# Patient Record
Sex: MALE | Race: WHITE | NOT HISPANIC OR LATINO | Employment: OTHER | URBAN - METROPOLITAN AREA
[De-identification: names, ages, dates, MRNs, and addresses within clinical notes are randomized per-mention and may not be internally consistent; named-entity substitution may affect disease eponyms.]

---

## 2017-03-02 ENCOUNTER — OFFICE VISIT (OUTPATIENT)
Dept: INTERNAL MEDICINE | Facility: CLINIC | Age: 67
End: 2017-03-02

## 2017-03-02 VITALS
HEIGHT: 68 IN | DIASTOLIC BLOOD PRESSURE: 82 MMHG | TEMPERATURE: 98.1 F | BODY MASS INDEX: 34.95 KG/M2 | OXYGEN SATURATION: 98 % | WEIGHT: 230.6 LBS | HEART RATE: 60 BPM | SYSTOLIC BLOOD PRESSURE: 130 MMHG

## 2017-03-02 DIAGNOSIS — E29.1 HYPOGONADISM IN MALE: ICD-10-CM

## 2017-03-02 DIAGNOSIS — Z00.00 ROUTINE HEALTH MAINTENANCE: ICD-10-CM

## 2017-03-02 DIAGNOSIS — R73.01 IMPAIRED FASTING GLUCOSE: ICD-10-CM

## 2017-03-02 DIAGNOSIS — E53.8 VITAMIN B12 DEFICIENCY: ICD-10-CM

## 2017-03-02 DIAGNOSIS — R73.03 PREDIABETES: ICD-10-CM

## 2017-03-02 DIAGNOSIS — N40.1 BENIGN NON-NODULAR PROSTATIC HYPERPLASIA WITH LOWER URINARY TRACT SYMPTOMS: ICD-10-CM

## 2017-03-02 DIAGNOSIS — E78.5 HYPERLIPIDEMIA, UNSPECIFIED HYPERLIPIDEMIA TYPE: Primary | ICD-10-CM

## 2017-03-02 DIAGNOSIS — Z23 IMMUNIZATION DUE: ICD-10-CM

## 2017-03-02 PROCEDURE — G0009 ADMIN PNEUMOCOCCAL VACCINE: HCPCS | Performed by: FAMILY MEDICINE

## 2017-03-02 PROCEDURE — 99214 OFFICE O/P EST MOD 30 MIN: CPT | Performed by: FAMILY MEDICINE

## 2017-03-02 PROCEDURE — 90670 PCV13 VACCINE IM: CPT | Performed by: FAMILY MEDICINE

## 2017-03-02 RX ORDER — INFLUENZA A VIRUS A/MICHIGAN/45/2015 X-275 (H1N1) ANTIGEN (FORMALDEHYDE INACTIVATED), INFLUENZA A VIRUS A/SINGAPORE/INFIMH-16-0019/2016 IVR-186 (H3N2) ANTIGEN (FORMALDEHYDE INACTIVATED), AND INFLUENZA B VIRUS B/MARYLAND/15/2016 BX-69A (A B/COLORADO/6/2017-LIKE VIRUS) ANTIGEN (FORMALDEHYDE INACTIVATED) 60; 60; 60 UG/.5ML; UG/.5ML; UG/.5ML
INJECTION, SUSPENSION INTRAMUSCULAR
COMMUNITY
Start: 2016-12-26 | End: 2017-05-08

## 2017-03-02 NOTE — PROGRESS NOTES
Subjective     Berhane Chaidez is a 66 y.o. male, who presents with a chief complaint of   Chief Complaint   Patient presents with   • Follow-up     3 month.   • Hyperlipidemia   • Prediabetes       HPI     1. Hyperlipidemia.  Pt continues to tolerate simvastatin.  He exercises and tries to watch his diet.    2. Prediabetes.  We decreased the metformin to once daily last visit since A1c was 5.2.    3.  BPH.  Pt reports symptoms are controlled with current medication.  He sees his urologist regularly.    The following portions of the patient's history were reviewed and updated as appropriate: allergies, current medications, past family history, past medical history, past social history, past surgical history and problem list.    Allergies: Review of patient's allergies indicates no known allergies.    Review of Systems   Constitutional: Negative.    HENT: Negative.    Eyes: Negative.    Respiratory: Negative.    Cardiovascular: Negative.    Gastrointestinal: Negative.    Endocrine: Negative.    Genitourinary: Negative.    Musculoskeletal: Negative.    Skin: Negative.    Allergic/Immunologic: Negative.    Neurological: Negative.    Hematological: Negative.    Psychiatric/Behavioral: Negative.        Objective     Wt Readings from Last 3 Encounters:   03/02/17 230 lb 9.6 oz (105 kg)   11/23/16 227 lb 9.6 oz (103 kg)   08/25/16 220 lb (99.8 kg)     Temp Readings from Last 3 Encounters:   03/02/17 98.1 °F (36.7 °C)   08/25/16 98.4 °F (36.9 °C)   02/17/16 98.6 °F (37 °C)     BP Readings from Last 3 Encounters:   03/02/17 130/82   11/23/16 140/92   08/25/16 144/91     Pulse Readings from Last 3 Encounters:   03/02/17 60   11/23/16 80   08/25/16 79     Body mass index is 35.06 kg/(m^2).  SpO2 Readings from Last 3 Encounters:   03/02/17 98%   11/23/16 98%   08/25/16 95%       Physical Exam   Constitutional: He appears well-developed and well-nourished.   HENT:   Head: Normocephalic.   Eyes: Conjunctivae are normal.   Neck: No  thyromegaly present.   Cardiovascular: Normal rate, regular rhythm and normal heart sounds.    No murmur heard.  Pulmonary/Chest: Effort normal and breath sounds normal.   Abdominal: Soft. There is no tenderness.   Musculoskeletal: He exhibits no edema.   Lymphadenopathy:     He has no cervical adenopathy.   Neurological: He is alert.   Skin: Skin is warm and dry.   Psychiatric: He has a normal mood and affect.   Vitals reviewed.      Results for orders placed or performed in visit on 11/28/16   Comprehensive Metabolic Panel   Result Value Ref Range    Glucose 109 (H) 65 - 99 mg/dL    BUN 14 8 - 23 mg/dL    Creatinine 0.98 0.76 - 1.27 mg/dL    eGFR Non African Am 76 >60 mL/min/1.73    eGFR African Am 92 >60 mL/min/1.73    BUN/Creatinine Ratio 14.3 7.0 - 25.0    Sodium 139 136 - 145 mmol/L    Potassium 4.4 3.5 - 5.2 mmol/L    Chloride 99 98 - 107 mmol/L    Total CO2 27.5 22.0 - 29.0 mmol/L    Calcium 9.2 8.8 - 10.5 mg/dL    Total Protein 6.4 6.0 - 8.5 g/dL    Albumin 4.20 3.50 - 5.20 g/dL    Globulin 2.2 gm/dL    A/G Ratio 1.9 g/dL    Total Bilirubin 0.5 0.2 - 1.2 mg/dL    Alkaline Phosphatase 74 40 - 129 U/L    AST (SGOT) 15 5 - 40 U/L    ALT (SGPT) 20 5 - 41 U/L   Lipid Panel With / Chol / HDL Ratio   Result Value Ref Range    Total Cholesterol 166 0 - 200 mg/dL    Triglycerides 102 0 - 150 mg/dL    HDL Cholesterol 68 (H) 40 - 60 mg/dL    VLDL Cholesterol 20.4 8 - 32 mg/dL    LDL Cholesterol  78 0 - 100 mg/dL    Chol/HDL Ratio 2.44    Hemoglobin A1c   Result Value Ref Range    Hemoglobin A1C 5.80 (H) 4.80 - 5.60 %   Vitamin B12   Result Value Ref Range    Vitamin B-12 400 211 - 946 pg/mL   TSH   Result Value Ref Range    TSH 0.904 0.270 - 4.200 mIU/mL   CBC & Differential   Result Value Ref Range    WBC 4.54 (L) 4.80 - 10.80 10*3/mm3    RBC 5.81 4.70 - 6.10 10*6/mm3    Hemoglobin 15.9 14.0 - 18.0 g/dL    Hematocrit 48.4 42.0 - 52.0 %    MCV 83.3 80.0 - 94.0 fL    MCH 27.4 27.0 - 31.0 pg    MCHC 32.9 31.0 - 37.0  g/dL    RDW 13.6 11.5 - 14.5 %    Platelets 240 140 - 500 10*3/mm3    Neutrophil Rel % 67.5 45.0 - 70.0 %    Lymphocyte Rel % 17.8 (L) 20.0 - 45.0 %    Monocyte Rel % 9.9 (H) 3.0 - 8.0 %    Eosinophil Rel % 3.7 0.0 - 4.0 %    Basophil Rel % 0.9 0.0 - 2.0 %    Neutrophils Absolute 3.06 1.50 - 8.30 10*3/mm3    Lymphocytes Absolute 0.81 0.60 - 4.80 10*3/mm3    Monocytes Absolute 0.45 0.00 - 1.00 10*3/mm3    Eosinophils Absolute 0.17 0.10 - 0.30 10*3/mm3    Basophils Absolute 0.04 0.00 - 0.20 10*3/mm3    Immature Granulocyte Rel % 0.2 0.0 - 0.5 %    Immature Grans Absolute 0.01 0.00 - 0.03 10*3/mm3    nRBC 0.0 0.0 - 0.0 /100 WBC       Assessment/Plan   Berhane was seen today for follow-up, hyperlipidemia and prediabetes.    Diagnoses and all orders for this visit:    Hyperlipidemia, unspecified hyperlipidemia type  -     Comprehensive Metabolic Panel; Future  -     Lipid Panel With / Chol / HDL Ratio; Future    Prediabetes  -     Hemoglobin A1c; Future  -     TSH; Future    Benign non-nodular prostatic hyperplasia with lower urinary tract symptoms    Hypogonadism in male  -     CBC & Differential; Future    Impaired fasting glucose   -     Hemoglobin A1c; Future    Routine health maintenance    1. Hyperlipidemia.  Well-controlled.  Continue statin and lifestyle measures.    2. Prediabetes.  A1c 5.8.  Continue once daily metformin and lifestyle measures.    3. BPH.  Symptoms controlled.  Per Dr. Dumont.    4. Hypogonadism.  Has Testopel implants per Dr. Dumont.    5. Routine health maint.  Prevnar today.  Pneumovax in one year.      Outpatient Medications Prior to Visit   Medication Sig Dispense Refill   • aspirin 81 MG EC tablet Take 81 mg by mouth daily.     • Garlic 10 MG capsule 2,000 mg daily.     • Glucosamine-Chondroit-Vit C-Mn (GLUCOSAMINE 1500 COMPLEX) capsule Take 1 can by mouth daily.     • ibuprofen (ADVIL,MOTRIN) 800 MG tablet Take 800 mg by mouth Every 6 (Six) Hours As Needed.     • Lysine HCl 1000 MG tablet  Take  by mouth daily.     • metFORMIN (GLUCOPHAGE) 500 MG tablet Take 1 tablet by mouth Daily With Breakfast. Hold for 48 hours from time of CAT scan.  Then resume normal course 180 tablet 0   • SILDENAFIL CITRATE PO Take 20 mg by mouth daily.     • simvastatin (ZOCOR) 40 MG tablet Take 1 tablet by mouth daily. AT BEDTIME 90 tablet 3   • tamsulosin (FLOMAX) 0.4 MG capsule 24 hr capsule Take 1 capsule by mouth daily. 90 capsule 3   • Testosterone (TESTOPEL IL) by Implant route. Injections Twice a Year.       No facility-administered medications prior to visit.      No orders of the defined types were placed in this encounter.    [unfilled]  There are no discontinued medications.      Return in about 6 months (around 9/2/2017).

## 2017-03-03 DIAGNOSIS — Z23 IMMUNIZATION DUE: Primary | ICD-10-CM

## 2017-03-03 RX ORDER — CYANOCOBALAMIN 1000 UG/ML
1000 INJECTION, SOLUTION INTRAMUSCULAR; SUBCUTANEOUS
Status: DISCONTINUED | OUTPATIENT
Start: 2017-03-03 | End: 2017-03-03

## 2017-03-07 ENCOUNTER — RESULTS ENCOUNTER (OUTPATIENT)
Dept: INTERNAL MEDICINE | Facility: CLINIC | Age: 67
End: 2017-03-07

## 2017-03-07 DIAGNOSIS — R73.03 PREDIABETES: ICD-10-CM

## 2017-03-07 DIAGNOSIS — E29.1 HYPOGONADISM IN MALE: ICD-10-CM

## 2017-03-07 DIAGNOSIS — R73.01 IMPAIRED FASTING GLUCOSE: ICD-10-CM

## 2017-03-07 DIAGNOSIS — E78.5 HYPERLIPIDEMIA, UNSPECIFIED HYPERLIPIDEMIA TYPE: ICD-10-CM

## 2017-05-08 ENCOUNTER — HOSPITAL ENCOUNTER (OUTPATIENT)
Dept: GENERAL RADIOLOGY | Facility: HOSPITAL | Age: 67
End: 2017-05-08

## 2017-05-08 ENCOUNTER — HOSPITAL ENCOUNTER (OUTPATIENT)
Dept: GENERAL RADIOLOGY | Facility: HOSPITAL | Age: 67
Discharge: HOME | End: 2017-05-08

## 2017-05-08 ENCOUNTER — HOSPITAL ENCOUNTER (OUTPATIENT)
Dept: GENERAL RADIOLOGY | Facility: HOSPITAL | Age: 67
Discharge: HOME | End: 2017-05-08
Admitting: ORTHOPAEDIC SURGERY

## 2017-05-08 ENCOUNTER — APPOINTMENT (OUTPATIENT)
Dept: PREADMISSION TESTING | Facility: HOSPITAL | Age: 67
End: 2017-05-08

## 2017-05-08 VITALS
DIASTOLIC BLOOD PRESSURE: 81 MMHG | RESPIRATION RATE: 16 BRPM | SYSTOLIC BLOOD PRESSURE: 150 MMHG | WEIGHT: 236 LBS | BODY MASS INDEX: 35.77 KG/M2 | OXYGEN SATURATION: 97 % | HEIGHT: 68 IN | HEART RATE: 83 BPM | TEMPERATURE: 98.2 F

## 2017-05-08 LAB
ALBUMIN SERPL-MCNC: 4.1 G/DL (ref 3.5–5.2)
ALBUMIN/GLOB SERPL: 1.5 G/DL
ALP SERPL-CCNC: 61 U/L (ref 39–117)
ALT SERPL W P-5'-P-CCNC: 22 U/L (ref 1–41)
ANION GAP SERPL CALCULATED.3IONS-SCNC: 11.6 MMOL/L
APTT PPP: 26.5 SECONDS (ref 22.7–35.4)
AST SERPL-CCNC: 16 U/L (ref 1–40)
BASOPHILS # BLD AUTO: 0.03 10*3/MM3 (ref 0–0.2)
BASOPHILS NFR BLD AUTO: 0.4 % (ref 0–1.5)
BILIRUB SERPL-MCNC: 0.3 MG/DL (ref 0.1–1.2)
BILIRUB UR QL STRIP: NEGATIVE
BUN BLD-MCNC: 17 MG/DL (ref 8–23)
BUN/CREAT SERPL: 12.3 (ref 7–25)
CALCIUM SPEC-SCNC: 9.6 MG/DL (ref 8.6–10.5)
CHLORIDE SERPL-SCNC: 100 MMOL/L (ref 98–107)
CLARITY UR: ABNORMAL
CO2 SERPL-SCNC: 29.4 MMOL/L (ref 22–29)
COLOR UR: YELLOW
CREAT BLD-MCNC: 1.38 MG/DL (ref 0.76–1.27)
DEPRECATED RDW RBC AUTO: 51.5 FL (ref 37–54)
EOSINOPHIL # BLD AUTO: 0.15 10*3/MM3 (ref 0–0.7)
EOSINOPHIL NFR BLD AUTO: 1.8 % (ref 0.3–6.2)
ERYTHROCYTE [DISTWIDTH] IN BLOOD BY AUTOMATED COUNT: 15.7 % (ref 11.5–14.5)
GFR SERPL CREATININE-BSD FRML MDRD: 51 ML/MIN/1.73
GLOBULIN UR ELPH-MCNC: 2.7 GM/DL
GLUCOSE BLD-MCNC: 120 MG/DL (ref 65–99)
GLUCOSE UR STRIP-MCNC: NEGATIVE MG/DL
HCT VFR BLD AUTO: 49 % (ref 40.4–52.2)
HGB BLD-MCNC: 16.2 G/DL (ref 13.7–17.6)
HGB UR QL STRIP.AUTO: NEGATIVE
IMM GRANULOCYTES # BLD: 0.02 10*3/MM3 (ref 0–0.03)
IMM GRANULOCYTES NFR BLD: 0.2 % (ref 0–0.5)
INR PPP: 0.97 (ref 0.9–1.1)
KETONES UR QL STRIP: NEGATIVE
LEUKOCYTE ESTERASE UR QL STRIP.AUTO: NEGATIVE
LYMPHOCYTES # BLD AUTO: 1.19 10*3/MM3 (ref 0.9–4.8)
LYMPHOCYTES NFR BLD AUTO: 13.9 % (ref 19.6–45.3)
MCH RBC QN AUTO: 29.2 PG (ref 27–32.7)
MCHC RBC AUTO-ENTMCNC: 33.1 G/DL (ref 32.6–36.4)
MCV RBC AUTO: 88.4 FL (ref 79.8–96.2)
MONOCYTES # BLD AUTO: 0.7 10*3/MM3 (ref 0.2–1.2)
MONOCYTES NFR BLD AUTO: 8.2 % (ref 5–12)
NEUTROPHILS # BLD AUTO: 6.46 10*3/MM3 (ref 1.9–8.1)
NEUTROPHILS NFR BLD AUTO: 75.5 % (ref 42.7–76)
NITRITE UR QL STRIP: NEGATIVE
PH UR STRIP.AUTO: 7 [PH] (ref 5–8)
PLATELET # BLD AUTO: 230 10*3/MM3 (ref 140–500)
PMV BLD AUTO: 9.9 FL (ref 6–12)
POTASSIUM BLD-SCNC: 4 MMOL/L (ref 3.5–5.2)
PROT SERPL-MCNC: 6.8 G/DL (ref 6–8.5)
PROT UR QL STRIP: NEGATIVE
PROTHROMBIN TIME: 12.5 SECONDS (ref 11.7–14.2)
RBC # BLD AUTO: 5.54 10*6/MM3 (ref 4.6–6)
SODIUM BLD-SCNC: 141 MMOL/L (ref 136–145)
SP GR UR STRIP: 1.02 (ref 1–1.03)
UROBILINOGEN UR QL STRIP: ABNORMAL
WBC NRBC COR # BLD: 8.55 10*3/MM3 (ref 4.5–10.7)

## 2017-05-08 PROCEDURE — 93010 ELECTROCARDIOGRAM REPORT: CPT | Performed by: INTERNAL MEDICINE

## 2017-05-08 PROCEDURE — 85610 PROTHROMBIN TIME: CPT | Performed by: ORTHOPAEDIC SURGERY

## 2017-05-08 PROCEDURE — 85730 THROMBOPLASTIN TIME PARTIAL: CPT | Performed by: ORTHOPAEDIC SURGERY

## 2017-05-08 PROCEDURE — 85025 COMPLETE CBC W/AUTO DIFF WBC: CPT | Performed by: ORTHOPAEDIC SURGERY

## 2017-05-08 PROCEDURE — 93005 ELECTROCARDIOGRAM TRACING: CPT

## 2017-05-08 PROCEDURE — 81003 URINALYSIS AUTO W/O SCOPE: CPT | Performed by: ORTHOPAEDIC SURGERY

## 2017-05-08 PROCEDURE — 36415 COLL VENOUS BLD VENIPUNCTURE: CPT | Performed by: FAMILY MEDICINE

## 2017-05-08 PROCEDURE — 80053 COMPREHEN METABOLIC PANEL: CPT | Performed by: ORTHOPAEDIC SURGERY

## 2017-05-08 PROCEDURE — 71020 HC CHEST PA AND LATERAL: CPT

## 2017-05-08 PROCEDURE — 73560 X-RAY EXAM OF KNEE 1 OR 2: CPT

## 2017-05-22 ENCOUNTER — ANESTHESIA EVENT (OUTPATIENT)
Dept: PERIOP | Facility: HOSPITAL | Age: 67
End: 2017-05-22

## 2017-05-22 ENCOUNTER — APPOINTMENT (OUTPATIENT)
Dept: GENERAL RADIOLOGY | Facility: HOSPITAL | Age: 67
End: 2017-05-22

## 2017-05-22 ENCOUNTER — ANESTHESIA (OUTPATIENT)
Dept: PERIOP | Facility: HOSPITAL | Age: 67
End: 2017-05-22

## 2017-05-22 ENCOUNTER — HOSPITAL ENCOUNTER (INPATIENT)
Facility: HOSPITAL | Age: 67
LOS: 1 days | Discharge: HOME HEALTH | End: 2017-05-23
Attending: ORTHOPAEDIC SURGERY | Admitting: ORTHOPAEDIC SURGERY

## 2017-05-22 DIAGNOSIS — R26.89 IMPAIRED GAIT AND MOBILITY: Primary | ICD-10-CM

## 2017-05-22 PROBLEM — M17.9 OSTEOARTHRITIS, KNEE: Status: ACTIVE | Noted: 2017-05-22

## 2017-05-22 LAB
GLUCOSE BLDC GLUCOMTR-MCNC: 103 MG/DL (ref 70–130)
GLUCOSE BLDC GLUCOMTR-MCNC: 133 MG/DL (ref 70–130)
GLUCOSE BLDC GLUCOMTR-MCNC: 149 MG/DL (ref 70–130)

## 2017-05-22 PROCEDURE — 97161 PT EVAL LOW COMPLEX 20 MIN: CPT

## 2017-05-22 PROCEDURE — 25010000002 HYDROMORPHONE PER 4 MG: Performed by: NURSE ANESTHETIST, CERTIFIED REGISTERED

## 2017-05-22 PROCEDURE — 73560 X-RAY EXAM OF KNEE 1 OR 2: CPT

## 2017-05-22 PROCEDURE — 93005 ELECTROCARDIOGRAM TRACING: CPT | Performed by: ANESTHESIOLOGY

## 2017-05-22 PROCEDURE — 93010 ELECTROCARDIOGRAM REPORT: CPT | Performed by: INTERNAL MEDICINE

## 2017-05-22 PROCEDURE — C1776 JOINT DEVICE (IMPLANTABLE): HCPCS | Performed by: ORTHOPAEDIC SURGERY

## 2017-05-22 PROCEDURE — 0SRC0J9 REPLACEMENT OF RIGHT KNEE JOINT WITH SYNTHETIC SUBSTITUTE, CEMENTED, OPEN APPROACH: ICD-10-PCS | Performed by: ORTHOPAEDIC SURGERY

## 2017-05-22 PROCEDURE — 25010000003 CEFAZOLIN IN DEXTROSE 2-4 GM/100ML-% SOLUTION: Performed by: ORTHOPAEDIC SURGERY

## 2017-05-22 PROCEDURE — C1713 ANCHOR/SCREW BN/BN,TIS/BN: HCPCS | Performed by: ORTHOPAEDIC SURGERY

## 2017-05-22 PROCEDURE — 25010000003 CEFAZOLIN IN DEXTROSE 2-4 GM/100ML-% SOLUTION: Performed by: NURSE ANESTHETIST, CERTIFIED REGISTERED

## 2017-05-22 PROCEDURE — 25010000002 FENTANYL CITRATE (PF) 100 MCG/2ML SOLUTION

## 2017-05-22 PROCEDURE — 25010000002 MIDAZOLAM PER 1 MG: Performed by: ANESTHESIOLOGY

## 2017-05-22 PROCEDURE — 25010000002 PROPOFOL 10 MG/ML EMULSION: Performed by: NURSE ANESTHETIST, CERTIFIED REGISTERED

## 2017-05-22 PROCEDURE — 25010000002 FENTANYL CITRATE (PF) 100 MCG/2ML SOLUTION: Performed by: NURSE ANESTHETIST, CERTIFIED REGISTERED

## 2017-05-22 PROCEDURE — 25010000002 ONDANSETRON PER 1 MG: Performed by: NURSE ANESTHETIST, CERTIFIED REGISTERED

## 2017-05-22 PROCEDURE — 25010000002 VANCOMYCIN: Performed by: ORTHOPAEDIC SURGERY

## 2017-05-22 PROCEDURE — 25010000002 ONDANSETRON PER 1 MG: Performed by: ORTHOPAEDIC SURGERY

## 2017-05-22 PROCEDURE — 25010000002 HYDROMORPHONE PER 4 MG

## 2017-05-22 PROCEDURE — 82962 GLUCOSE BLOOD TEST: CPT

## 2017-05-22 DEVICE — IMPLANTABLE DEVICE: Type: IMPLANTABLE DEVICE | Site: KNEE | Status: FUNCTIONAL

## 2017-05-22 DEVICE — TOTL KN FM CEM VE ZIMMER: Type: IMPLANTABLE DEVICE | Site: KNEE | Status: FUNCTIONAL

## 2017-05-22 DEVICE — ART/SRF KN PERSONA/VE MC EF 8TO11 10MM RT: Type: IMPLANTABLE DEVICE | Site: KNEE | Status: FUNCTIONAL

## 2017-05-22 DEVICE — EXT STEM FEM/KN PERSONA TPR 14XPLS30MM: Type: IMPLANTABLE DEVICE | Status: FUNCTIONAL

## 2017-05-22 DEVICE — COMP FEM/KN PERSONA CR CMT COCR STD SZ10 RT: Type: IMPLANTABLE DEVICE | Site: KNEE | Status: FUNCTIONAL

## 2017-05-22 DEVICE — CMT BONE PALACOS 120001: Type: IMPLANTABLE DEVICE | Site: KNEE | Status: FUNCTIONAL

## 2017-05-22 DEVICE — STEM TIB/KN PERSONA CMT 5D SZF RT: Type: IMPLANTABLE DEVICE | Site: KNEE | Status: FUNCTIONAL

## 2017-05-22 RX ORDER — NALOXONE HCL 0.4 MG/ML
0.1 VIAL (ML) INJECTION
Status: DISCONTINUED | OUTPATIENT
Start: 2017-05-22 | End: 2017-05-23 | Stop reason: HOSPADM

## 2017-05-22 RX ORDER — SODIUM CHLORIDE 0.9 % (FLUSH) 0.9 %
1-10 SYRINGE (ML) INJECTION AS NEEDED
Status: DISCONTINUED | OUTPATIENT
Start: 2017-05-22 | End: 2017-05-23 | Stop reason: HOSPADM

## 2017-05-22 RX ORDER — SODIUM CHLORIDE 9 MG/ML
INJECTION, SOLUTION INTRAVENOUS AS NEEDED
Status: DISCONTINUED | OUTPATIENT
Start: 2017-05-22 | End: 2017-05-22 | Stop reason: HOSPADM

## 2017-05-22 RX ORDER — PROMETHAZINE HYDROCHLORIDE 25 MG/1
25 SUPPOSITORY RECTAL ONCE AS NEEDED
Status: DISCONTINUED | OUTPATIENT
Start: 2017-05-22 | End: 2017-05-22 | Stop reason: HOSPADM

## 2017-05-22 RX ORDER — TAMSULOSIN HYDROCHLORIDE 0.4 MG/1
0.4 CAPSULE ORAL DAILY
Status: DISCONTINUED | OUTPATIENT
Start: 2017-05-22 | End: 2017-05-23 | Stop reason: HOSPADM

## 2017-05-22 RX ORDER — MIDAZOLAM HYDROCHLORIDE 1 MG/ML
2 INJECTION INTRAMUSCULAR; INTRAVENOUS
Status: DISCONTINUED | OUTPATIENT
Start: 2017-05-22 | End: 2017-05-22 | Stop reason: HOSPADM

## 2017-05-22 RX ORDER — DOCUSATE SODIUM 100 MG/1
100 CAPSULE, LIQUID FILLED ORAL 2 TIMES DAILY PRN
Status: DISCONTINUED | OUTPATIENT
Start: 2017-05-22 | End: 2017-05-23 | Stop reason: HOSPADM

## 2017-05-22 RX ORDER — FENTANYL CITRATE 50 UG/ML
50 INJECTION, SOLUTION INTRAMUSCULAR; INTRAVENOUS
Status: DISCONTINUED | OUTPATIENT
Start: 2017-05-22 | End: 2017-05-22 | Stop reason: HOSPADM

## 2017-05-22 RX ORDER — LIDOCAINE HYDROCHLORIDE 20 MG/ML
INJECTION, SOLUTION INFILTRATION; PERINEURAL AS NEEDED
Status: DISCONTINUED | OUTPATIENT
Start: 2017-05-22 | End: 2017-05-22 | Stop reason: SURG

## 2017-05-22 RX ORDER — OXYCODONE HYDROCHLORIDE AND ACETAMINOPHEN 5; 325 MG/1; MG/1
2 TABLET ORAL EVERY 4 HOURS PRN
Status: DISCONTINUED | OUTPATIENT
Start: 2017-05-22 | End: 2017-05-23 | Stop reason: HOSPADM

## 2017-05-22 RX ORDER — ONDANSETRON 4 MG/1
4 TABLET, FILM COATED ORAL EVERY 6 HOURS PRN
Status: DISCONTINUED | OUTPATIENT
Start: 2017-05-22 | End: 2017-05-23 | Stop reason: HOSPADM

## 2017-05-22 RX ORDER — ASPIRIN 325 MG
325 TABLET, DELAYED RELEASE (ENTERIC COATED) ORAL DAILY
Status: DISCONTINUED | OUTPATIENT
Start: 2017-05-22 | End: 2017-05-23 | Stop reason: HOSPADM

## 2017-05-22 RX ORDER — FERROUS SULFATE 325(65) MG
325 TABLET ORAL
Status: DISCONTINUED | OUTPATIENT
Start: 2017-05-22 | End: 2017-05-23 | Stop reason: HOSPADM

## 2017-05-22 RX ORDER — ROCURONIUM BROMIDE 10 MG/ML
INJECTION, SOLUTION INTRAVENOUS AS NEEDED
Status: DISCONTINUED | OUTPATIENT
Start: 2017-05-22 | End: 2017-05-22 | Stop reason: SURG

## 2017-05-22 RX ORDER — DIPHENHYDRAMINE HCL 25 MG
25 CAPSULE ORAL EVERY 6 HOURS PRN
Status: DISCONTINUED | OUTPATIENT
Start: 2017-05-22 | End: 2017-05-23 | Stop reason: HOSPADM

## 2017-05-22 RX ORDER — FAMOTIDINE 10 MG/ML
20 INJECTION, SOLUTION INTRAVENOUS ONCE
Status: COMPLETED | OUTPATIENT
Start: 2017-05-22 | End: 2017-05-22

## 2017-05-22 RX ORDER — SODIUM CHLORIDE 0.9 % (FLUSH) 0.9 %
1-10 SYRINGE (ML) INJECTION AS NEEDED
Status: DISCONTINUED | OUTPATIENT
Start: 2017-05-22 | End: 2017-05-22 | Stop reason: HOSPADM

## 2017-05-22 RX ORDER — FENTANYL CITRATE 50 UG/ML
INJECTION, SOLUTION INTRAMUSCULAR; INTRAVENOUS
Status: COMPLETED
Start: 2017-05-22 | End: 2017-05-22

## 2017-05-22 RX ORDER — ONDANSETRON 2 MG/ML
4 INJECTION INTRAMUSCULAR; INTRAVENOUS EVERY 6 HOURS PRN
Status: DISCONTINUED | OUTPATIENT
Start: 2017-05-22 | End: 2017-05-23 | Stop reason: HOSPADM

## 2017-05-22 RX ORDER — PROMETHAZINE HYDROCHLORIDE 25 MG/ML
12.5 INJECTION, SOLUTION INTRAMUSCULAR; INTRAVENOUS ONCE AS NEEDED
Status: DISCONTINUED | OUTPATIENT
Start: 2017-05-22 | End: 2017-05-22 | Stop reason: HOSPADM

## 2017-05-22 RX ORDER — PROMETHAZINE HYDROCHLORIDE 25 MG/1
12.5 TABLET ORAL ONCE AS NEEDED
Status: DISCONTINUED | OUTPATIENT
Start: 2017-05-22 | End: 2017-05-22 | Stop reason: HOSPADM

## 2017-05-22 RX ORDER — ACETAMINOPHEN 500 MG
1000 TABLET ORAL ONCE
Status: COMPLETED | OUTPATIENT
Start: 2017-05-22 | End: 2017-05-22

## 2017-05-22 RX ORDER — CEFAZOLIN SODIUM 2 G/100ML
2 INJECTION, SOLUTION INTRAVENOUS EVERY 8 HOURS
Status: COMPLETED | OUTPATIENT
Start: 2017-05-22 | End: 2017-05-23

## 2017-05-22 RX ORDER — BISACODYL 5 MG/1
10 TABLET, DELAYED RELEASE ORAL DAILY PRN
Status: DISCONTINUED | OUTPATIENT
Start: 2017-05-22 | End: 2017-05-23 | Stop reason: HOSPADM

## 2017-05-22 RX ORDER — TRANEXAMIC ACID 100 MG/ML
INJECTION, SOLUTION INTRAVENOUS AS NEEDED
Status: DISCONTINUED | OUTPATIENT
Start: 2017-05-22 | End: 2017-05-22 | Stop reason: SURG

## 2017-05-22 RX ORDER — PROPOFOL 10 MG/ML
VIAL (ML) INTRAVENOUS AS NEEDED
Status: DISCONTINUED | OUTPATIENT
Start: 2017-05-22 | End: 2017-05-22 | Stop reason: SURG

## 2017-05-22 RX ORDER — DIPHENHYDRAMINE HYDROCHLORIDE 50 MG/ML
12.5 INJECTION INTRAMUSCULAR; INTRAVENOUS
Status: DISCONTINUED | OUTPATIENT
Start: 2017-05-22 | End: 2017-05-22 | Stop reason: HOSPADM

## 2017-05-22 RX ORDER — ONDANSETRON 2 MG/ML
4 INJECTION INTRAMUSCULAR; INTRAVENOUS ONCE AS NEEDED
Status: DISCONTINUED | OUTPATIENT
Start: 2017-05-22 | End: 2017-05-22 | Stop reason: HOSPADM

## 2017-05-22 RX ORDER — MIDAZOLAM HYDROCHLORIDE 1 MG/ML
1 INJECTION INTRAMUSCULAR; INTRAVENOUS
Status: DISCONTINUED | OUTPATIENT
Start: 2017-05-22 | End: 2017-05-22 | Stop reason: HOSPADM

## 2017-05-22 RX ORDER — LABETALOL HYDROCHLORIDE 5 MG/ML
5 INJECTION, SOLUTION INTRAVENOUS
Status: DISCONTINUED | OUTPATIENT
Start: 2017-05-22 | End: 2017-05-22 | Stop reason: HOSPADM

## 2017-05-22 RX ORDER — FENTANYL CITRATE 50 UG/ML
INJECTION, SOLUTION INTRAMUSCULAR; INTRAVENOUS AS NEEDED
Status: DISCONTINUED | OUTPATIENT
Start: 2017-05-22 | End: 2017-05-22 | Stop reason: SURG

## 2017-05-22 RX ORDER — SODIUM CHLORIDE, SODIUM LACTATE, POTASSIUM CHLORIDE, CALCIUM CHLORIDE 600; 310; 30; 20 MG/100ML; MG/100ML; MG/100ML; MG/100ML
9 INJECTION, SOLUTION INTRAVENOUS CONTINUOUS
Status: DISCONTINUED | OUTPATIENT
Start: 2017-05-22 | End: 2017-05-23 | Stop reason: HOSPADM

## 2017-05-22 RX ORDER — SODIUM CHLORIDE, SODIUM LACTATE, POTASSIUM CHLORIDE, CALCIUM CHLORIDE 600; 310; 30; 20 MG/100ML; MG/100ML; MG/100ML; MG/100ML
100 INJECTION, SOLUTION INTRAVENOUS CONTINUOUS
Status: DISCONTINUED | OUTPATIENT
Start: 2017-05-22 | End: 2017-05-23 | Stop reason: HOSPADM

## 2017-05-22 RX ORDER — ONDANSETRON 4 MG/1
4 TABLET, ORALLY DISINTEGRATING ORAL EVERY 6 HOURS PRN
Status: DISCONTINUED | OUTPATIENT
Start: 2017-05-22 | End: 2017-05-23 | Stop reason: HOSPADM

## 2017-05-22 RX ORDER — HYDROCODONE BITARTRATE AND ACETAMINOPHEN 7.5; 325 MG/1; MG/1
1 TABLET ORAL ONCE AS NEEDED
Status: DISCONTINUED | OUTPATIENT
Start: 2017-05-22 | End: 2017-05-22 | Stop reason: HOSPADM

## 2017-05-22 RX ORDER — NALOXONE HCL 0.4 MG/ML
0.2 VIAL (ML) INJECTION AS NEEDED
Status: DISCONTINUED | OUTPATIENT
Start: 2017-05-22 | End: 2017-05-22 | Stop reason: HOSPADM

## 2017-05-22 RX ORDER — CEFAZOLIN SODIUM 2 G/100ML
INJECTION, SOLUTION INTRAVENOUS AS NEEDED
Status: DISCONTINUED | OUTPATIENT
Start: 2017-05-22 | End: 2017-05-22 | Stop reason: SURG

## 2017-05-22 RX ORDER — HYDRALAZINE HYDROCHLORIDE 20 MG/ML
5 INJECTION INTRAMUSCULAR; INTRAVENOUS
Status: DISCONTINUED | OUTPATIENT
Start: 2017-05-22 | End: 2017-05-22 | Stop reason: HOSPADM

## 2017-05-22 RX ORDER — OXYCODONE HYDROCHLORIDE AND ACETAMINOPHEN 5; 325 MG/1; MG/1
1 TABLET ORAL EVERY 4 HOURS PRN
Status: DISCONTINUED | OUTPATIENT
Start: 2017-05-22 | End: 2017-05-23 | Stop reason: HOSPADM

## 2017-05-22 RX ORDER — BISACODYL 10 MG
10 SUPPOSITORY, RECTAL RECTAL DAILY PRN
Status: DISCONTINUED | OUTPATIENT
Start: 2017-05-22 | End: 2017-05-23 | Stop reason: HOSPADM

## 2017-05-22 RX ORDER — OXYCODONE AND ACETAMINOPHEN 7.5; 325 MG/1; MG/1
1 TABLET ORAL ONCE AS NEEDED
Status: DISCONTINUED | OUTPATIENT
Start: 2017-05-22 | End: 2017-05-22 | Stop reason: HOSPADM

## 2017-05-22 RX ORDER — ACETAMINOPHEN 325 MG/1
325 TABLET ORAL EVERY 4 HOURS PRN
Status: DISCONTINUED | OUTPATIENT
Start: 2017-05-22 | End: 2017-05-23 | Stop reason: HOSPADM

## 2017-05-22 RX ORDER — ROSUVASTATIN CALCIUM 40 MG/1
40 TABLET, COATED ORAL DAILY
Status: DISCONTINUED | OUTPATIENT
Start: 2017-05-22 | End: 2017-05-23 | Stop reason: HOSPADM

## 2017-05-22 RX ORDER — HYDROMORPHONE HYDROCHLORIDE 1 MG/ML
0.5 INJECTION, SOLUTION INTRAMUSCULAR; INTRAVENOUS; SUBCUTANEOUS
Status: DISCONTINUED | OUTPATIENT
Start: 2017-05-22 | End: 2017-05-22 | Stop reason: HOSPADM

## 2017-05-22 RX ORDER — KETOROLAC TROMETHAMINE 30 MG/ML
30 INJECTION, SOLUTION INTRAMUSCULAR; INTRAVENOUS EVERY 6 HOURS
Status: DISCONTINUED | OUTPATIENT
Start: 2017-05-22 | End: 2017-05-23 | Stop reason: HOSPADM

## 2017-05-22 RX ORDER — PROMETHAZINE HYDROCHLORIDE 25 MG/1
25 TABLET ORAL ONCE AS NEEDED
Status: DISCONTINUED | OUTPATIENT
Start: 2017-05-22 | End: 2017-05-22 | Stop reason: HOSPADM

## 2017-05-22 RX ORDER — HYDROMORPHONE HCL 110MG/55ML
PATIENT CONTROLLED ANALGESIA SYRINGE INTRAVENOUS AS NEEDED
Status: DISCONTINUED | OUTPATIENT
Start: 2017-05-22 | End: 2017-05-22 | Stop reason: SURG

## 2017-05-22 RX ORDER — FLUMAZENIL 0.1 MG/ML
0.2 INJECTION INTRAVENOUS AS NEEDED
Status: DISCONTINUED | OUTPATIENT
Start: 2017-05-22 | End: 2017-05-22 | Stop reason: HOSPADM

## 2017-05-22 RX ORDER — ONDANSETRON 2 MG/ML
INJECTION INTRAMUSCULAR; INTRAVENOUS AS NEEDED
Status: DISCONTINUED | OUTPATIENT
Start: 2017-05-22 | End: 2017-05-22 | Stop reason: SURG

## 2017-05-22 RX ORDER — ACETAMINOPHEN 325 MG/1
650 TABLET ORAL EVERY 4 HOURS PRN
Status: DISCONTINUED | OUTPATIENT
Start: 2017-05-22 | End: 2017-05-23 | Stop reason: HOSPADM

## 2017-05-22 RX ADMIN — METFORMIN HYDROCHLORIDE 500 MG: 500 TABLET ORAL at 16:28

## 2017-05-22 RX ADMIN — HYDROMORPHONE HYDROCHLORIDE 0.5 MG: 2 INJECTION, SOLUTION INTRAMUSCULAR; INTRAVENOUS; SUBCUTANEOUS at 10:19

## 2017-05-22 RX ADMIN — HYDROMORPHONE HYDROCHLORIDE 0.5 MG: 1 INJECTION, SOLUTION INTRAMUSCULAR; INTRAVENOUS; SUBCUTANEOUS at 12:21

## 2017-05-22 RX ADMIN — FAMOTIDINE 20 MG: 10 INJECTION, SOLUTION INTRAVENOUS at 07:53

## 2017-05-22 RX ADMIN — ONDANSETRON 4 MG: 2 INJECTION INTRAMUSCULAR; INTRAVENOUS at 09:28

## 2017-05-22 RX ADMIN — SODIUM CHLORIDE, POTASSIUM CHLORIDE, SODIUM LACTATE AND CALCIUM CHLORIDE 100 ML/HR: 600; 310; 30; 20 INJECTION, SOLUTION INTRAVENOUS at 13:42

## 2017-05-22 RX ADMIN — OXYCODONE HYDROCHLORIDE AND ACETAMINOPHEN 2 TABLET: 5; 325 TABLET ORAL at 23:11

## 2017-05-22 RX ADMIN — ONDANSETRON 4 MG: 2 INJECTION INTRAMUSCULAR; INTRAVENOUS at 13:43

## 2017-05-22 RX ADMIN — OXYCODONE HYDROCHLORIDE AND ACETAMINOPHEN 2 TABLET: 5; 325 TABLET ORAL at 15:16

## 2017-05-22 RX ADMIN — LIDOCAINE HYDROCHLORIDE 60 MG: 20 INJECTION, SOLUTION INFILTRATION; PERINEURAL at 08:27

## 2017-05-22 RX ADMIN — HYDROMORPHONE HYDROCHLORIDE 0.5 MG: 1 INJECTION, SOLUTION INTRAMUSCULAR; INTRAVENOUS; SUBCUTANEOUS at 10:25

## 2017-05-22 RX ADMIN — FENTANYL CITRATE 100 MCG: 50 INJECTION INTRAMUSCULAR; INTRAVENOUS at 08:24

## 2017-05-22 RX ADMIN — ROCURONIUM BROMIDE 40 MG: 10 INJECTION INTRAVENOUS at 08:27

## 2017-05-22 RX ADMIN — FENTANYL CITRATE 50 MCG: 50 INJECTION, SOLUTION INTRAMUSCULAR; INTRAVENOUS at 10:50

## 2017-05-22 RX ADMIN — SODIUM CHLORIDE, POTASSIUM CHLORIDE, SODIUM LACTATE AND CALCIUM CHLORIDE: 600; 310; 30; 20 INJECTION, SOLUTION INTRAVENOUS at 09:30

## 2017-05-22 RX ADMIN — OXYCODONE HYDROCHLORIDE AND ACETAMINOPHEN 2 TABLET: 5; 325 TABLET ORAL at 19:02

## 2017-05-22 RX ADMIN — TRANEXAMIC ACID 1000 MG: 100 INJECTION, SOLUTION INTRAVENOUS at 09:28

## 2017-05-22 RX ADMIN — SODIUM CHLORIDE, POTASSIUM CHLORIDE, SODIUM LACTATE AND CALCIUM CHLORIDE 100 ML/HR: 600; 310; 30; 20 INJECTION, SOLUTION INTRAVENOUS at 17:26

## 2017-05-22 RX ADMIN — HYDROMORPHONE HYDROCHLORIDE 0.5 MG: 2 INJECTION, SOLUTION INTRAMUSCULAR; INTRAVENOUS; SUBCUTANEOUS at 09:00

## 2017-05-22 RX ADMIN — HYDROMORPHONE HYDROCHLORIDE 0.5 MG: 2 INJECTION, SOLUTION INTRAMUSCULAR; INTRAVENOUS; SUBCUTANEOUS at 10:14

## 2017-05-22 RX ADMIN — PROPOFOL 200 MG: 10 INJECTION, EMULSION INTRAVENOUS at 08:27

## 2017-05-22 RX ADMIN — MIDAZOLAM 2 MG: 1 INJECTION INTRAMUSCULAR; INTRAVENOUS at 07:53

## 2017-05-22 RX ADMIN — FENTANYL CITRATE 50 MCG: 50 INJECTION, SOLUTION INTRAMUSCULAR; INTRAVENOUS at 10:57

## 2017-05-22 RX ADMIN — VANCOMYCIN HYDROCHLORIDE 1500 MG: 1 INJECTION, POWDER, LYOPHILIZED, FOR SOLUTION INTRAVENOUS at 07:42

## 2017-05-22 RX ADMIN — CEFAZOLIN SODIUM 2 G: 2 INJECTION, SOLUTION INTRAVENOUS at 17:26

## 2017-05-22 RX ADMIN — SODIUM CHLORIDE, POTASSIUM CHLORIDE, SODIUM LACTATE AND CALCIUM CHLORIDE 9 ML/HR: 600; 310; 30; 20 INJECTION, SOLUTION INTRAVENOUS at 07:26

## 2017-05-22 RX ADMIN — ASPIRIN 325 MG: 325 TABLET, DELAYED RELEASE ORAL at 15:16

## 2017-05-22 RX ADMIN — CEFAZOLIN SODIUM 2 G: 2 INJECTION, SOLUTION INTRAVENOUS at 08:17

## 2017-05-22 RX ADMIN — ACETAMINOPHEN 1000 MG: 500 TABLET ORAL at 07:26

## 2017-05-22 RX ADMIN — HYDROMORPHONE HYDROCHLORIDE 0.5 MG: 1 INJECTION, SOLUTION INTRAMUSCULAR; INTRAVENOUS; SUBCUTANEOUS at 11:49

## 2017-05-23 VITALS
HEIGHT: 68 IN | BODY MASS INDEX: 35.77 KG/M2 | TEMPERATURE: 98 F | HEART RATE: 76 BPM | WEIGHT: 236 LBS | RESPIRATION RATE: 16 BRPM | DIASTOLIC BLOOD PRESSURE: 75 MMHG | SYSTOLIC BLOOD PRESSURE: 136 MMHG | OXYGEN SATURATION: 97 %

## 2017-05-23 LAB
ANION GAP SERPL CALCULATED.3IONS-SCNC: 9.7 MMOL/L
BUN BLD-MCNC: 9 MG/DL (ref 8–23)
BUN/CREAT SERPL: 9.2 (ref 7–25)
CALCIUM SPEC-SCNC: 8.6 MG/DL (ref 8.6–10.5)
CHLORIDE SERPL-SCNC: 98 MMOL/L (ref 98–107)
CO2 SERPL-SCNC: 30.3 MMOL/L (ref 22–29)
CREAT BLD-MCNC: 0.98 MG/DL (ref 0.76–1.27)
GFR SERPL CREATININE-BSD FRML MDRD: 76 ML/MIN/1.73
GLUCOSE BLD-MCNC: 146 MG/DL (ref 65–99)
GLUCOSE BLDC GLUCOMTR-MCNC: 119 MG/DL (ref 70–130)
HCT VFR BLD AUTO: 42.9 % (ref 40.4–52.2)
HGB BLD-MCNC: 14.1 G/DL (ref 13.7–17.6)
POTASSIUM BLD-SCNC: 4.4 MMOL/L (ref 3.5–5.2)
SODIUM BLD-SCNC: 138 MMOL/L (ref 136–145)

## 2017-05-23 PROCEDURE — 85014 HEMATOCRIT: CPT | Performed by: ORTHOPAEDIC SURGERY

## 2017-05-23 PROCEDURE — 25010000002 KETOROLAC TROMETHAMINE PER 15 MG: Performed by: ORTHOPAEDIC SURGERY

## 2017-05-23 PROCEDURE — 85018 HEMOGLOBIN: CPT | Performed by: ORTHOPAEDIC SURGERY

## 2017-05-23 PROCEDURE — 97150 GROUP THERAPEUTIC PROCEDURES: CPT

## 2017-05-23 PROCEDURE — 25010000003 CEFAZOLIN IN DEXTROSE 2-4 GM/100ML-% SOLUTION: Performed by: ORTHOPAEDIC SURGERY

## 2017-05-23 PROCEDURE — 97110 THERAPEUTIC EXERCISES: CPT

## 2017-05-23 PROCEDURE — 80048 BASIC METABOLIC PNL TOTAL CA: CPT | Performed by: ORTHOPAEDIC SURGERY

## 2017-05-23 PROCEDURE — 25010000002 FONDAPARINUX PER 0.5 MG: Performed by: ORTHOPAEDIC SURGERY

## 2017-05-23 PROCEDURE — 82962 GLUCOSE BLOOD TEST: CPT

## 2017-05-23 RX ORDER — OXYCODONE HYDROCHLORIDE AND ACETAMINOPHEN 5; 325 MG/1; MG/1
1-2 TABLET ORAL EVERY 4 HOURS PRN
Qty: 100 TABLET | Refills: 0 | Status: SHIPPED | OUTPATIENT
Start: 2017-05-23 | End: 2017-06-01

## 2017-05-23 RX ORDER — FONDAPARINUX SODIUM 2.5 MG/.5ML
2.5 INJECTION SUBCUTANEOUS ONCE
Status: COMPLETED | OUTPATIENT
Start: 2017-05-23 | End: 2017-05-23

## 2017-05-23 RX ADMIN — METFORMIN HYDROCHLORIDE 500 MG: 500 TABLET ORAL at 08:08

## 2017-05-23 RX ADMIN — OXYCODONE HYDROCHLORIDE AND ACETAMINOPHEN 2 TABLET: 5; 325 TABLET ORAL at 03:47

## 2017-05-23 RX ADMIN — ASPIRIN 325 MG: 325 TABLET, DELAYED RELEASE ORAL at 08:08

## 2017-05-23 RX ADMIN — FONDAPARINUX SODIUM 2.5 MG: 2.5 INJECTION, SOLUTION SUBCUTANEOUS at 08:09

## 2017-05-23 RX ADMIN — KETOROLAC TROMETHAMINE 30 MG: 30 INJECTION, SOLUTION INTRAMUSCULAR at 08:08

## 2017-05-23 RX ADMIN — CEFAZOLIN SODIUM 2 G: 2 INJECTION, SOLUTION INTRAVENOUS at 01:45

## 2017-05-23 RX ADMIN — TAMSULOSIN HYDROCHLORIDE 0.4 MG: 0.4 CAPSULE ORAL at 08:08

## 2017-05-23 RX ADMIN — FERROUS SULFATE TAB 325 MG (65 MG ELEMENTAL FE) 325 MG: 325 (65 FE) TAB at 08:08

## 2017-05-23 RX ADMIN — OXYCODONE HYDROCHLORIDE AND ACETAMINOPHEN 2 TABLET: 5; 325 TABLET ORAL at 08:08

## 2017-06-03 DIAGNOSIS — N40.1 BENIGN NON-NODULAR PROSTATIC HYPERPLASIA WITH LOWER URINARY TRACT SYMPTOMS: ICD-10-CM

## 2017-06-05 RX ORDER — TAMSULOSIN HYDROCHLORIDE 0.4 MG/1
CAPSULE ORAL
Qty: 90 CAPSULE | Refills: 2 | Status: SHIPPED | OUTPATIENT
Start: 2017-06-05 | End: 2018-01-04 | Stop reason: SDUPTHER

## 2017-06-23 LAB
BASOPHILS # BLD AUTO: 0.05 10*3/MM3 (ref 0–0.2)
BASOPHILS NFR BLD AUTO: 0.7 % (ref 0–2)
CHOLEST SERPL-MCNC: 161 MG/DL (ref 0–200)
CHOLEST/HDLC SERPL: 2.6 {RATIO}
EOSINOPHIL # BLD AUTO: 0.19 10*3/MM3 (ref 0.1–0.3)
EOSINOPHIL NFR BLD AUTO: 2.7 % (ref 0–4)
ERYTHROCYTE [DISTWIDTH] IN BLOOD BY AUTOMATED COUNT: 14.4 % (ref 11.5–14.5)
HBA1C MFR BLD: 5.1 % (ref 4.8–5.6)
HCT VFR BLD AUTO: 46.1 % (ref 42–52)
HDLC SERPL-MCNC: 62 MG/DL (ref 40–60)
HGB BLD-MCNC: 14.9 G/DL (ref 14–18)
IMM GRANULOCYTES # BLD: 0.03 10*3/MM3 (ref 0–0.03)
IMM GRANULOCYTES NFR BLD: 0.4 % (ref 0–0.5)
LDLC SERPL CALC-MCNC: 78 MG/DL (ref 0–100)
LYMPHOCYTES # BLD AUTO: 0.89 10*3/MM3 (ref 0.6–4.8)
LYMPHOCYTES NFR BLD AUTO: 12.9 % (ref 20–45)
MCH RBC QN AUTO: 27.7 PG (ref 27–31)
MCHC RBC AUTO-ENTMCNC: 32.3 G/DL (ref 31–37)
MCV RBC AUTO: 85.7 FL (ref 80–94)
MONOCYTES # BLD AUTO: 0.53 10*3/MM3 (ref 0–1)
MONOCYTES NFR BLD AUTO: 7.7 % (ref 3–8)
NEUTROPHILS # BLD AUTO: 5.23 10*3/MM3 (ref 1.5–8.3)
NEUTROPHILS NFR BLD AUTO: 75.6 % (ref 45–70)
NRBC BLD AUTO-RTO: 0 /100 WBC (ref 0–0)
PLATELET # BLD AUTO: 258 10*3/MM3 (ref 140–500)
RBC # BLD AUTO: 5.38 10*6/MM3 (ref 4.7–6.1)
TRIGL SERPL-MCNC: 107 MG/DL (ref 0–150)
TSH SERPL DL<=0.005 MIU/L-ACNC: 0.87 MIU/ML (ref 0.27–4.2)
VLDLC SERPL CALC-MCNC: 21.4 MG/DL (ref 8–32)
WBC # BLD AUTO: 6.92 10*3/MM3 (ref 4.8–10.8)

## 2017-06-29 ENCOUNTER — OFFICE VISIT (OUTPATIENT)
Dept: INTERNAL MEDICINE | Facility: CLINIC | Age: 67
End: 2017-06-29

## 2017-06-29 VITALS
WEIGHT: 228 LBS | DIASTOLIC BLOOD PRESSURE: 70 MMHG | BODY MASS INDEX: 34.67 KG/M2 | HEART RATE: 90 BPM | SYSTOLIC BLOOD PRESSURE: 130 MMHG | OXYGEN SATURATION: 94 % | TEMPERATURE: 98.2 F

## 2017-06-29 DIAGNOSIS — Z00.00 ROUTINE HEALTH MAINTENANCE: ICD-10-CM

## 2017-06-29 DIAGNOSIS — E78.5 HYPERLIPIDEMIA, UNSPECIFIED HYPERLIPIDEMIA TYPE: Primary | ICD-10-CM

## 2017-06-29 DIAGNOSIS — E78.5 HYPERLIPIDEMIA, UNSPECIFIED HYPERLIPIDEMIA TYPE: ICD-10-CM

## 2017-06-29 DIAGNOSIS — E29.1 HYPOGONADISM IN MALE: ICD-10-CM

## 2017-06-29 DIAGNOSIS — N40.1 BENIGN NON-NODULAR PROSTATIC HYPERPLASIA WITH LOWER URINARY TRACT SYMPTOMS: ICD-10-CM

## 2017-06-29 DIAGNOSIS — R73.03 PREDIABETES: ICD-10-CM

## 2017-06-29 DIAGNOSIS — I47.1 ATRIAL TACHYCARDIA (HCC): ICD-10-CM

## 2017-06-29 PROBLEM — I47.19 ATRIAL TACHYCARDIA: Status: ACTIVE | Noted: 2017-06-29

## 2017-06-29 PROCEDURE — 99214 OFFICE O/P EST MOD 30 MIN: CPT | Performed by: FAMILY MEDICINE

## 2017-06-29 RX ORDER — SIMVASTATIN 40 MG
40 TABLET ORAL DAILY
Qty: 90 TABLET | Refills: 3 | Status: SHIPPED | OUTPATIENT
Start: 2017-06-29 | End: 2018-07-19 | Stop reason: SDUPTHER

## 2017-06-29 NOTE — PROGRESS NOTES
Subjective     Berhane Chaidez is a 67 y.o. male, who presents with a chief complaint of   Chief Complaint   Patient presents with   • Hyperlipidemia       Hyperlipidemia        1. Hyperlipidemia.  Pt continues to tolerate simvastatin.  He exercises and tries to watch his diet.    2. Prediabetes.  He is taking once daily metformin and watching his diet.    3.  BPH.  Pt reports symptoms are controlled with current medication.  He sees his urologist regularly.    4. OA.  He had a right TKA 5/22/17.  He is undergoing outpatient physical therapy.    The following portions of the patient's history were reviewed and updated as appropriate: allergies, current medications, past family history, past medical history, past social history, past surgical history and problem list.    Allergies: Review of patient's allergies indicates no known allergies.    Review of Systems   Constitutional: Negative.    HENT: Negative.    Eyes: Negative.    Respiratory: Negative.    Cardiovascular: Negative.    Gastrointestinal: Negative.    Endocrine: Negative.    Genitourinary: Negative.    Musculoskeletal: Negative.    Skin: Negative.    Allergic/Immunologic: Negative.    Neurological: Negative.    Hematological: Negative.    Psychiatric/Behavioral: Negative.        Objective     Wt Readings from Last 3 Encounters:   06/29/17 228 lb (103 kg)   05/22/17 236 lb (107 kg)   05/08/17 236 lb (107 kg)     Temp Readings from Last 3 Encounters:   06/29/17 98.2 °F (36.8 °C)   05/23/17 98 °F (36.7 °C) (Oral)   05/08/17 98.2 °F (36.8 °C) (Oral)     BP Readings from Last 3 Encounters:   06/29/17 130/70   05/23/17 136/75   05/08/17 150/81     Pulse Readings from Last 3 Encounters:   06/29/17 90   05/23/17 76   05/08/17 83     Body mass index is 34.67 kg/(m^2).  SpO2 Readings from Last 3 Encounters:   06/29/17 94%   05/23/17 97%   05/08/17 97%       Physical Exam   Constitutional: He appears well-developed and well-nourished.   HENT:   Head: Normocephalic.    Eyes: Conjunctivae are normal.   Neck: No thyromegaly present.   Cardiovascular: Normal rate, regular rhythm and normal heart sounds.    No murmur heard.  Pulmonary/Chest: Effort normal and breath sounds normal.   Abdominal: Soft. There is no tenderness.   Musculoskeletal: He exhibits no edema.   Lymphadenopathy:     He has no cervical adenopathy.   Neurological: He is alert.   Skin: Skin is warm and dry.   Psychiatric: He has a normal mood and affect.   Vitals reviewed.        Assessment/Plan   Berhane was seen today for hyperlipidemia.    Diagnoses and all orders for this visit:    Hyperlipidemia, unspecified hyperlipidemia type  -     Comprehensive Metabolic Panel; Future  -     Lipid Panel With / Chol / HDL Ratio; Future    Prediabetes  -     Hemoglobin A1c; Future    Benign non-nodular prostatic hyperplasia with lower urinary tract symptoms    Hypogonadism in male  -     CBC & Differential; Future  -     TSH; Future    Routine health maintenance  -     PSA; Future    Atrial tachycardia  -     Ambulatory Referral to Cardiology    1. Hyperlipidemia.  Well-controlled.  Continue statin and lifestyle measures.    2. Prediabetes.  A1c 5.1.  Continue once daily metformin and lifestyle measures.    3. BPH.  Symptoms controlled.  Per Dr. Dumont.    4. Hypogonadism.  Has Testopel implants per Dr. Dumont.    5. Routine health maint.  Prevnar UTD. Pneumovax 3/2018. Colonoscopy UTD.    6. Atrial tachycardia.  Found on pre-op ECG.  To cardiology.  To Dr. Mccall per patient request.      Outpatient Medications Prior to Visit   Medication Sig Dispense Refill   • aspirin  MG EC tablet Take 1 tablet by mouth Daily for 42 days. 42 tablet 0   • ibuprofen (ADVIL,MOTRIN) 800 MG tablet Take 800 mg by mouth Every 6 (Six) Hours As Needed for Mild Pain (1-3) or Headache (STOPPED FOR SURGERY).     • metFORMIN (GLUCOPHAGE) 500 MG tablet Take 500 mg by mouth Daily With Breakfast.     • tamsulosin (FLOMAX) 0.4 MG capsule 24 hr  capsule TAKE 1 CAPSULE DAILY 90 capsule 2   • Testosterone (TESTOPEL IL) by Implant route. Injections Twice a Year.     • simvastatin (ZOCOR) 40 MG tablet Take 1 tablet by mouth daily. AT BEDTIME (Patient taking differently: Take 40 mg by mouth Every Night. AT BEDTIME) 90 tablet 3   • Garlic 10 MG capsule 2,000 mg Daily. HELD FOR SURGERY     • Glucosamine-Chondroit-Vit C-Mn (GLUCOSAMINE 1500 COMPLEX) capsule Take 1 can by mouth Daily. HELD FOR SURGERY     • Lysine HCl 1000 MG tablet Take  by mouth Daily. HELD FOR SURGERY     • SILDENAFIL CITRATE PO Take 20 mg by mouth Daily. WILL STOP 7 DAYS PRIOR TO SURGERY       No facility-administered medications prior to visit.      No orders of the defined types were placed in this encounter.    [unfilled]  Medications Discontinued During This Encounter   Medication Reason   • Garlic 10 MG capsule Therapy completed   • Glucosamine-Chondroit-Vit C-Mn (GLUCOSAMINE 1500 COMPLEX) capsule Therapy completed   • Lysine HCl 1000 MG tablet Therapy completed   • SILDENAFIL CITRATE PO Therapy completed         Return in about 6 months (around 12/29/2017).

## 2017-07-04 ENCOUNTER — RESULTS ENCOUNTER (OUTPATIENT)
Dept: INTERNAL MEDICINE | Facility: CLINIC | Age: 67
End: 2017-07-04

## 2017-07-04 DIAGNOSIS — Z00.00 ROUTINE HEALTH MAINTENANCE: ICD-10-CM

## 2017-07-04 DIAGNOSIS — R73.03 PREDIABETES: ICD-10-CM

## 2017-07-04 DIAGNOSIS — E29.1 HYPOGONADISM IN MALE: ICD-10-CM

## 2017-07-04 DIAGNOSIS — E78.5 HYPERLIPIDEMIA, UNSPECIFIED HYPERLIPIDEMIA TYPE: ICD-10-CM

## 2017-11-07 DIAGNOSIS — R73.03 PREDIABETES: ICD-10-CM

## 2017-12-19 LAB
ALBUMIN SERPL-MCNC: 4.3 G/DL (ref 3.5–5.2)
ALBUMIN/GLOB SERPL: 2 G/DL
ALP SERPL-CCNC: 69 U/L (ref 40–129)
ALT SERPL-CCNC: 21 U/L (ref 5–41)
AST SERPL-CCNC: 15 U/L (ref 5–40)
BASOPHILS # BLD AUTO: 0.06 10*3/MM3 (ref 0–0.2)
BASOPHILS NFR BLD AUTO: 0.9 % (ref 0–2)
BILIRUB SERPL-MCNC: 0.5 MG/DL (ref 0.2–1.2)
BUN SERPL-MCNC: 12 MG/DL (ref 8–23)
BUN/CREAT SERPL: 11.4 (ref 7–25)
CALCIUM SERPL-MCNC: 9.6 MG/DL (ref 8.8–10.5)
CHLORIDE SERPL-SCNC: 99 MMOL/L (ref 98–107)
CHOLEST SERPL-MCNC: 170 MG/DL (ref 0–200)
CHOLEST/HDLC SERPL: 2.93 {RATIO}
CO2 SERPL-SCNC: 29.9 MMOL/L (ref 22–29)
CREAT SERPL-MCNC: 1.05 MG/DL (ref 0.76–1.27)
EOSINOPHIL # BLD AUTO: 0.27 10*3/MM3 (ref 0.1–0.3)
EOSINOPHIL NFR BLD AUTO: 4.3 % (ref 0–4)
ERYTHROCYTE [DISTWIDTH] IN BLOOD BY AUTOMATED COUNT: 13.6 % (ref 11.5–14.5)
GLOBULIN SER CALC-MCNC: 2.1 GM/DL
GLUCOSE SERPL-MCNC: 107 MG/DL (ref 65–99)
HBA1C MFR BLD: 5.4 % (ref 4.8–5.6)
HCT VFR BLD AUTO: 51.8 % (ref 42–52)
HDLC SERPL-MCNC: 58 MG/DL (ref 40–60)
HGB BLD-MCNC: 16.8 G/DL (ref 14–18)
IMM GRANULOCYTES # BLD: 0.02 10*3/MM3 (ref 0–0.03)
IMM GRANULOCYTES NFR BLD: 0.3 % (ref 0–0.5)
LDLC SERPL CALC-MCNC: 88 MG/DL (ref 0–100)
LYMPHOCYTES # BLD AUTO: 0.8 10*3/MM3 (ref 0.6–4.8)
LYMPHOCYTES NFR BLD AUTO: 12.7 % (ref 20–45)
MCH RBC QN AUTO: 28.5 PG (ref 27–31)
MCHC RBC AUTO-ENTMCNC: 32.4 G/DL (ref 31–37)
MCV RBC AUTO: 87.8 FL (ref 80–94)
MONOCYTES # BLD AUTO: 0.52 10*3/MM3 (ref 0–1)
MONOCYTES NFR BLD AUTO: 8.2 % (ref 3–8)
NEUTROPHILS # BLD AUTO: 4.65 10*3/MM3 (ref 1.5–8.3)
NEUTROPHILS NFR BLD AUTO: 73.6 % (ref 45–70)
NRBC BLD AUTO-RTO: 0 /100 WBC (ref 0–0)
PLATELET # BLD AUTO: 243 10*3/MM3 (ref 140–500)
POTASSIUM SERPL-SCNC: 4.8 MMOL/L (ref 3.5–5.2)
PROT SERPL-MCNC: 6.4 G/DL (ref 6–8.5)
PSA SERPL-MCNC: 0.81 NG/ML (ref 0–4)
RBC # BLD AUTO: 5.9 10*6/MM3 (ref 4.7–6.1)
SODIUM SERPL-SCNC: 139 MMOL/L (ref 136–145)
TRIGL SERPL-MCNC: 119 MG/DL (ref 0–150)
TSH SERPL DL<=0.005 MIU/L-ACNC: 0.86 MIU/ML (ref 0.27–4.2)
VLDLC SERPL CALC-MCNC: 23.8 MG/DL (ref 8–32)
WBC # BLD AUTO: 6.32 10*3/MM3 (ref 4.8–10.8)

## 2018-01-04 ENCOUNTER — OFFICE VISIT (OUTPATIENT)
Dept: INTERNAL MEDICINE | Facility: CLINIC | Age: 68
End: 2018-01-04

## 2018-01-04 VITALS
OXYGEN SATURATION: 98 % | HEIGHT: 68 IN | DIASTOLIC BLOOD PRESSURE: 84 MMHG | BODY MASS INDEX: 36.13 KG/M2 | WEIGHT: 238.4 LBS | SYSTOLIC BLOOD PRESSURE: 148 MMHG | TEMPERATURE: 98.8 F | HEART RATE: 63 BPM

## 2018-01-04 DIAGNOSIS — R73.03 PREDIABETES: ICD-10-CM

## 2018-01-04 DIAGNOSIS — I47.1 SUPRAVENTRICULAR TACHYCARDIA (HCC): ICD-10-CM

## 2018-01-04 DIAGNOSIS — E78.5 HYPERLIPIDEMIA, UNSPECIFIED HYPERLIPIDEMIA TYPE: Primary | ICD-10-CM

## 2018-01-04 DIAGNOSIS — E29.1 HYPOGONADISM IN MALE: ICD-10-CM

## 2018-01-04 DIAGNOSIS — N40.1 BENIGN PROSTATIC HYPERPLASIA WITH LOWER URINARY TRACT SYMPTOMS, SYMPTOM DETAILS UNSPECIFIED: ICD-10-CM

## 2018-01-04 DIAGNOSIS — N40.1 BENIGN NON-NODULAR PROSTATIC HYPERPLASIA WITH LOWER URINARY TRACT SYMPTOMS: ICD-10-CM

## 2018-01-04 DIAGNOSIS — Z00.00 ROUTINE HEALTH MAINTENANCE: ICD-10-CM

## 2018-01-04 PROBLEM — I47.10 SUPRAVENTRICULAR TACHYCARDIA: Status: ACTIVE | Noted: 2017-06-29

## 2018-01-04 PROCEDURE — 99214 OFFICE O/P EST MOD 30 MIN: CPT | Performed by: FAMILY MEDICINE

## 2018-01-04 RX ORDER — ACETAMINOPHEN 160 MG
2000 TABLET,DISINTEGRATING ORAL DAILY
COMMUNITY
End: 2021-04-12

## 2018-01-04 RX ORDER — BISOPROLOL FUMARATE 5 MG/1
5 TABLET, FILM COATED ORAL DAILY
COMMUNITY
Start: 2017-10-18 | End: 2021-06-28 | Stop reason: SDUPTHER

## 2018-01-04 RX ORDER — TAMSULOSIN HYDROCHLORIDE 0.4 MG/1
1 CAPSULE ORAL DAILY
Qty: 90 CAPSULE | Refills: 3 | Status: SHIPPED | OUTPATIENT
Start: 2018-01-04 | End: 2019-02-06 | Stop reason: SDUPTHER

## 2018-01-04 RX ORDER — SILDENAFIL CITRATE 20 MG/1
20 TABLET ORAL AS NEEDED
COMMUNITY

## 2018-01-04 RX ORDER — FLECAINIDE ACETATE 50 MG/1
50 TABLET ORAL 2 TIMES DAILY
COMMUNITY
Start: 2017-10-18 | End: 2023-02-22

## 2018-01-04 NOTE — PROGRESS NOTES
Subjective     Berhane Chaidez is a 67 y.o. male, who presents with a chief complaint of   Chief Complaint   Patient presents with   • Hyperlipidemia     Hyperlipidemia      1. Hyperlipidemia.  Pt continues to tolerate simvastatin.  He exercises and tries to watch his diet.    2. Prediabetes.  He is taking once daily metformin and watching his diet.    3.  BPH.  Pt reports symptoms are controlled with current medication.  He sees his urologist regularly.    4. SVT.  This was picked up on a stress test in October.  He has a loop recorder and is now on bisoprolol and flecainide.  He reports that he is feeling well.  Denies palpitations.    The following portions of the patient's history were reviewed and updated as appropriate: allergies, current medications, past family history, past medical history, past social history, past surgical history and problem list.    Allergies: Review of patient's allergies indicates no known allergies.    Review of Systems   Constitutional: Negative.    HENT: Negative.    Eyes: Negative.    Respiratory: Negative.    Cardiovascular: Negative.    Gastrointestinal: Negative.    Endocrine: Negative.    Genitourinary: Negative.    Musculoskeletal: Negative.    Skin: Negative.    Allergic/Immunologic: Negative.    Neurological: Negative.    Hematological: Negative.    Psychiatric/Behavioral: Negative.      Objective     Wt Readings from Last 3 Encounters:   01/04/18 108 kg (238 lb 6.4 oz)   06/29/17 103 kg (228 lb)   05/22/17 107 kg (236 lb)     Temp Readings from Last 3 Encounters:   01/04/18 98.8 °F (37.1 °C)   06/29/17 98.2 °F (36.8 °C)   05/23/17 98 °F (36.7 °C) (Oral)     BP Readings from Last 3 Encounters:   01/04/18 148/84   06/29/17 130/70   05/23/17 136/75     Pulse Readings from Last 3 Encounters:   01/04/18 63   06/29/17 90   05/23/17 76     Body mass index is 36.26 kg/(m^2).  SpO2 Readings from Last 3 Encounters:   01/04/18 98%   06/29/17 94%   05/23/17 97%       Physical Exam    Constitutional: He appears well-developed and well-nourished.   HENT:   Head: Normocephalic.   Eyes: Conjunctivae are normal.   Neck: No thyromegaly present.   Cardiovascular: Normal rate, regular rhythm and normal heart sounds.    No murmur heard.  Pulmonary/Chest: Effort normal and breath sounds normal.   Abdominal: Soft. There is no tenderness.   Musculoskeletal: He exhibits no edema.   Lymphadenopathy:     He has no cervical adenopathy.   Neurological: He is alert.   Skin: Skin is warm and dry.   Psychiatric: He has a normal mood and affect.   Vitals reviewed.    Assessment/Plan   Berhane was seen today for hyperlipidemia.    Diagnoses and all orders for this visit:    Hyperlipidemia, unspecified hyperlipidemia type  -     Comprehensive Metabolic Panel; Future  -     Lipid Panel With / Chol / HDL Ratio; Future  -     TSH; Future    Prediabetes  -     Hemoglobin A1c; Future  -     Vitamin B12; Future    Benign prostatic hyperplasia with lower urinary tract symptoms, symptom details unspecified    Hypogonadism in male  -     CBC & Differential; Future    Routine health maintenance    Supraventricular tachycardia    Benign non-nodular prostatic hyperplasia with lower urinary tract symptoms  -     tamsulosin (FLOMAX) 0.4 MG capsule 24 hr capsule; Take 1 capsule by mouth Daily.    1. Hyperlipidemia.  Well-controlled.  Continue statin and lifestyle measures.    2. Prediabetes.  A1c 5.4.  He would like to try going off metformin and continue lifestyle measures.  Recheck in 3 months.    3. BPH.  Symptoms controlled.  Per Dr. Dumont.    4. Hypogonadism.  Has Testopel implants per Dr. Dumont.    5. Routine health maint.  Prevnar UTD. Pneumovax 3/2018. Colonoscopy UTD.    6. SVT.  He sees Dr. Morgan.  He has a loop recorder in place and is on bisoprolol and flecainide.  Doing well.    7. Elevated blood pressure reading.  Home blood pressures running 110s-120s systolic.  Continue to monitor home blood pressures and call  if > staying >135/90.    Outpatient Medications Prior to Visit   Medication Sig Dispense Refill   • ibuprofen (ADVIL,MOTRIN) 800 MG tablet Take 800 mg by mouth Every 6 (Six) Hours As Needed for Mild Pain (1-3) or Headache (STOPPED FOR SURGERY).     • simvastatin (ZOCOR) 40 MG tablet Take 1 tablet by mouth Daily. AT BEDTIME 90 tablet 3   • Testosterone (TESTOPEL IL) by Implant route. Injections Twice a Year.     • tamsulosin (FLOMAX) 0.4 MG capsule 24 hr capsule TAKE 1 CAPSULE DAILY 90 capsule 2   • metFORMIN (GLUCOPHAGE) 500 MG tablet Take 500 mg by mouth Daily With Breakfast.     • metFORMIN (GLUCOPHAGE) 500 MG tablet TAKE 1 TABLET TWICE A  tablet 3     No facility-administered medications prior to visit.      New Medications Ordered This Visit   Medications   • tamsulosin (FLOMAX) 0.4 MG capsule 24 hr capsule     Sig: Take 1 capsule by mouth Daily.     Dispense:  90 capsule     Refill:  3     [unfilled]  Medications Discontinued During This Encounter   Medication Reason   • metFORMIN (GLUCOPHAGE) 500 MG tablet Therapy completed   • tamsulosin (FLOMAX) 0.4 MG capsule 24 hr capsule Reorder       Return in about 3 months (around 4/4/2018).

## 2018-04-06 ENCOUNTER — LAB (OUTPATIENT)
Dept: INTERNAL MEDICINE | Facility: CLINIC | Age: 68
End: 2018-04-06

## 2018-04-06 DIAGNOSIS — R73.03 PREDIABETES: ICD-10-CM

## 2018-04-06 DIAGNOSIS — E78.5 HYPERLIPIDEMIA, UNSPECIFIED HYPERLIPIDEMIA TYPE: ICD-10-CM

## 2018-04-06 DIAGNOSIS — E29.1 HYPOGONADISM IN MALE: ICD-10-CM

## 2018-04-06 LAB
ALBUMIN SERPL-MCNC: 4.3 G/DL (ref 3.5–5.2)
ALBUMIN/GLOB SERPL: 2 G/DL
ALP SERPL-CCNC: 82 U/L (ref 40–129)
ALT SERPL-CCNC: 29 U/L (ref 5–41)
AST SERPL-CCNC: 17 U/L (ref 5–40)
BASOPHILS # BLD AUTO: 0.05 10*3/MM3 (ref 0–0.2)
BASOPHILS NFR BLD AUTO: 0.8 % (ref 0–2)
BILIRUB SERPL-MCNC: 0.6 MG/DL (ref 0.2–1.2)
BUN SERPL-MCNC: 15 MG/DL (ref 8–23)
BUN/CREAT SERPL: 15.8 (ref 7–25)
CALCIUM SERPL-MCNC: 9.4 MG/DL (ref 8.8–10.5)
CHLORIDE SERPL-SCNC: 102 MMOL/L (ref 98–107)
CHOLEST SERPL-MCNC: 187 MG/DL (ref 0–200)
CHOLEST/HDLC SERPL: 3.12 {RATIO}
CO2 SERPL-SCNC: 29.6 MMOL/L (ref 22–29)
CREAT SERPL-MCNC: 0.95 MG/DL (ref 0.76–1.27)
EOSINOPHIL # BLD AUTO: 0.25 10*3/MM3 (ref 0.1–0.3)
EOSINOPHIL NFR BLD AUTO: 4 % (ref 0–4)
ERYTHROCYTE [DISTWIDTH] IN BLOOD BY AUTOMATED COUNT: 14.6 % (ref 11.5–14.5)
GFR SERPLBLD CREATININE-BSD FMLA CKD-EPI: 78 ML/MIN/1.73
GFR SERPLBLD CREATININE-BSD FMLA CKD-EPI: 95 ML/MIN/1.73
GLOBULIN SER CALC-MCNC: 2.1 GM/DL
GLUCOSE SERPL-MCNC: 105 MG/DL (ref 65–99)
HBA1C MFR BLD: 5.9 % (ref 4.8–5.6)
HCT VFR BLD AUTO: 47.8 % (ref 42–52)
HDLC SERPL-MCNC: 60 MG/DL (ref 40–60)
HGB BLD-MCNC: 16.1 G/DL (ref 14–18)
IMM GRANULOCYTES # BLD: 0.02 10*3/MM3 (ref 0–0.03)
IMM GRANULOCYTES NFR BLD: 0.3 % (ref 0–0.5)
LDLC SERPL CALC-MCNC: 106 MG/DL (ref 0–100)
LYMPHOCYTES # BLD AUTO: 0.96 10*3/MM3 (ref 0.6–4.8)
LYMPHOCYTES NFR BLD AUTO: 15.5 % (ref 20–45)
MCH RBC QN AUTO: 29 PG (ref 27–31)
MCHC RBC AUTO-ENTMCNC: 33.7 G/DL (ref 31–37)
MCV RBC AUTO: 86 FL (ref 80–94)
MONOCYTES # BLD AUTO: 0.57 10*3/MM3 (ref 0–1)
MONOCYTES NFR BLD AUTO: 9.2 % (ref 3–8)
NEUTROPHILS # BLD AUTO: 4.33 10*3/MM3 (ref 1.5–8.3)
NEUTROPHILS NFR BLD AUTO: 70.2 % (ref 45–70)
NRBC BLD AUTO-RTO: 0 /100 WBC (ref 0–0)
PLATELET # BLD AUTO: 229 10*3/MM3 (ref 140–500)
POTASSIUM SERPL-SCNC: 5.1 MMOL/L (ref 3.5–5.2)
PROT SERPL-MCNC: 6.4 G/DL (ref 6–8.5)
RBC # BLD AUTO: 5.56 10*6/MM3 (ref 4.7–6.1)
SODIUM SERPL-SCNC: 140 MMOL/L (ref 136–145)
TRIGL SERPL-MCNC: 104 MG/DL (ref 0–150)
TSH SERPL DL<=0.005 MIU/L-ACNC: 1.02 MIU/ML (ref 0.27–4.2)
VIT B12 SERPL-MCNC: 439 PG/ML (ref 232–1245)
VLDLC SERPL CALC-MCNC: 20.8 MG/DL (ref 8–32)
WBC # BLD AUTO: 6.18 10*3/MM3 (ref 4.8–10.8)

## 2018-04-12 ENCOUNTER — OFFICE VISIT (OUTPATIENT)
Dept: INTERNAL MEDICINE | Facility: CLINIC | Age: 68
End: 2018-04-12

## 2018-04-12 VITALS
BODY MASS INDEX: 36.83 KG/M2 | DIASTOLIC BLOOD PRESSURE: 80 MMHG | SYSTOLIC BLOOD PRESSURE: 120 MMHG | RESPIRATION RATE: 16 BRPM | HEIGHT: 68 IN | HEART RATE: 67 BPM | TEMPERATURE: 98.3 F | WEIGHT: 243 LBS | OXYGEN SATURATION: 97 %

## 2018-04-12 DIAGNOSIS — E78.5 HYPERLIPIDEMIA, UNSPECIFIED HYPERLIPIDEMIA TYPE: Primary | ICD-10-CM

## 2018-04-12 DIAGNOSIS — R73.03 PREDIABETES: ICD-10-CM

## 2018-04-12 DIAGNOSIS — Z00.00 ROUTINE HEALTH MAINTENANCE: ICD-10-CM

## 2018-04-12 DIAGNOSIS — I47.1 SUPRAVENTRICULAR TACHYCARDIA (HCC): ICD-10-CM

## 2018-04-12 DIAGNOSIS — N40.1 BENIGN PROSTATIC HYPERPLASIA WITH LOWER URINARY TRACT SYMPTOMS, SYMPTOM DETAILS UNSPECIFIED: ICD-10-CM

## 2018-04-12 DIAGNOSIS — E29.1 HYPOGONADISM IN MALE: ICD-10-CM

## 2018-04-12 PROCEDURE — 99214 OFFICE O/P EST MOD 30 MIN: CPT | Performed by: FAMILY MEDICINE

## 2018-04-12 NOTE — PROGRESS NOTES
Subjective     Berhane Chaidez is a 68 y.o. male, who presents with a chief complaint of   Chief Complaint   Patient presents with   • Hyperlipidemia     Hyperlipidemia      1. Hyperlipidemia.  Pt tolerates simvastatin.  He exercises and tries to watch his diet.    2. Prediabetes.  We stopped the metformin last time as his A1c was 5.4.  He is working on lifestyle measures.    3. SVT.  This was picked up on a stress test last October.  He has a loop recorder and is now on bisoprolol and flecainide.  He reports that he is feeling well.  Denies palpitations.    The following portions of the patient's history were reviewed and updated as appropriate: allergies, current medications, past family history, past medical history, past social history, past surgical history and problem list.    Allergies: Review of patient's allergies indicates no known allergies.    Review of Systems   Constitutional: Negative.    HENT: Negative.    Eyes: Negative.    Respiratory: Negative.    Cardiovascular: Negative.    Gastrointestinal: Negative.    Endocrine: Negative.    Genitourinary: Negative.    Musculoskeletal: Negative.    Skin: Negative.    Allergic/Immunologic: Negative.    Neurological: Negative.    Hematological: Negative.    Psychiatric/Behavioral: Negative.      Objective     Wt Readings from Last 3 Encounters:   04/12/18 110 kg (243 lb)   01/04/18 108 kg (238 lb 6.4 oz)   06/29/17 103 kg (228 lb)     Temp Readings from Last 3 Encounters:   04/12/18 98.3 °F (36.8 °C)   01/04/18 98.8 °F (37.1 °C)   06/29/17 98.2 °F (36.8 °C)     BP Readings from Last 3 Encounters:   04/12/18 120/80   01/04/18 148/84   06/29/17 130/70     Pulse Readings from Last 3 Encounters:   04/12/18 67   01/04/18 63   06/29/17 90     Body mass index is 36.96 kg/m².  SpO2 Readings from Last 3 Encounters:   01/04/18 98%   06/29/17 94%   05/23/17 97%       Physical Exam   Constitutional: He is oriented to person, place, and time. He appears well-developed and  well-nourished.   HENT:   Head: Normocephalic and atraumatic.   Eyes: Conjunctivae and EOM are normal.   Neck: Neck supple. No thyromegaly present.   Cardiovascular: Normal rate, regular rhythm and normal heart sounds.    No murmur heard.  Pulmonary/Chest: Effort normal and breath sounds normal.   Abdominal: Soft. There is no tenderness.   Musculoskeletal: Normal range of motion. He exhibits no edema.   Neurological: He is alert and oriented to person, place, and time.   Skin: Skin is warm and dry.   Psychiatric: He has a normal mood and affect. His behavior is normal.   Nursing note and vitals reviewed.    Assessment/Plan   Brehane was seen today for hyperlipidemia.    Diagnoses and all orders for this visit:    Hyperlipidemia, unspecified hyperlipidemia type  -     Comprehensive Metabolic Panel; Future  -     Lipid Panel With / Chol / HDL Ratio; Future    Prediabetes  -     Hemoglobin A1c; Future  -     Vitamin B12; Future    Benign prostatic hyperplasia with lower urinary tract symptoms, symptom details unspecified    Hypogonadism in male    Routine health maintenance  -     CBC & Differential; Future  -     Hepatitis C Antibody; Future    Supraventricular tachycardia  -     TSH; Future    1. Hyperlipidemia.  Well-controlled.  Continue statin and lifestyle measures.    2. Prediabetes.  A1c 5.9.  Stay off metformin and continue lifestyle measures.  Recheck in 3 months.    3. BPH.  Symptoms controlled.  Per Dr. Dumont.    4. Hypogonadism.  Has Testopel implants per Dr. Dumont.    5. Routine health maint.  Prevnar UTD.  Pneumovax today. Colonoscopy UTD.    6. SVT.  He sees Dr. Morgan.  He has a loop recorder in place and is on bisoprolol and flecainide.  Doing well.    Outpatient Medications Prior to Visit   Medication Sig Dispense Refill   • bisoprolol (ZEBeta) 5 MG tablet      • Cholecalciferol (VITAMIN D3) 2000 units capsule      • flecainide (TAMBOCOR) 50 MG tablet      • Garlic 1000 MG capsule      •  Glucosamine-Chondroit-Vit C-Mn (GLUCOSAMINE 1500 COMPLEX PO)      • ibuprofen (ADVIL,MOTRIN) 800 MG tablet Take 800 mg by mouth Every 6 (Six) Hours As Needed for Mild Pain (1-3) or Headache (STOPPED FOR SURGERY).     • L-Lysine 1000 MG tablet      • sildenafil (REVATIO) 20 MG tablet      • simvastatin (ZOCOR) 40 MG tablet Take 1 tablet by mouth Daily. AT BEDTIME 90 tablet 3   • tamsulosin (FLOMAX) 0.4 MG capsule 24 hr capsule Take 1 capsule by mouth Daily. 90 capsule 3   • Testosterone (TESTOPEL IL) by Implant route. Injections Twice a Year.     • metFORMIN (GLUCOPHAGE) 500 MG tablet Take 500 mg by mouth Daily With Breakfast.       No facility-administered medications prior to visit.      No orders of the defined types were placed in this encounter.    [unfilled]  Medications Discontinued During This Encounter   Medication Reason   • metFORMIN (GLUCOPHAGE) 500 MG tablet *Therapy completed       Return in about 3 months (around 7/12/2018).

## 2018-07-12 ENCOUNTER — LAB (OUTPATIENT)
Dept: INTERNAL MEDICINE | Facility: CLINIC | Age: 68
End: 2018-07-12

## 2018-07-12 DIAGNOSIS — I47.1 SUPRAVENTRICULAR TACHYCARDIA (HCC): ICD-10-CM

## 2018-07-12 DIAGNOSIS — Z00.00 ROUTINE HEALTH MAINTENANCE: ICD-10-CM

## 2018-07-12 DIAGNOSIS — R73.03 PREDIABETES: ICD-10-CM

## 2018-07-12 DIAGNOSIS — E78.5 HYPERLIPIDEMIA, UNSPECIFIED HYPERLIPIDEMIA TYPE: ICD-10-CM

## 2018-07-13 LAB
ALBUMIN SERPL-MCNC: 4.4 G/DL (ref 3.5–5.2)
ALBUMIN/GLOB SERPL: 2.1 G/DL
ALP SERPL-CCNC: 74 U/L (ref 40–129)
ALT SERPL-CCNC: 23 U/L (ref 5–41)
AST SERPL-CCNC: 17 U/L (ref 5–40)
BASOPHILS # BLD AUTO: 0.04 10*3/MM3 (ref 0–0.2)
BASOPHILS NFR BLD AUTO: 0.7 % (ref 0–2)
BILIRUB SERPL-MCNC: 0.6 MG/DL (ref 0.2–1.2)
BUN SERPL-MCNC: 15 MG/DL (ref 8–23)
BUN/CREAT SERPL: 14.3 (ref 7–25)
CALCIUM SERPL-MCNC: 9.6 MG/DL (ref 8.8–10.5)
CHLORIDE SERPL-SCNC: 101 MMOL/L (ref 98–107)
CHOLEST SERPL-MCNC: 170 MG/DL (ref 0–200)
CHOLEST/HDLC SERPL: 2.74 {RATIO}
CO2 SERPL-SCNC: 29.5 MMOL/L (ref 22–29)
CREAT SERPL-MCNC: 1.05 MG/DL (ref 0.76–1.27)
EOSINOPHIL # BLD AUTO: 0.23 10*3/MM3 (ref 0.1–0.3)
EOSINOPHIL NFR BLD AUTO: 3.9 % (ref 0–4)
ERYTHROCYTE [DISTWIDTH] IN BLOOD BY AUTOMATED COUNT: 13.4 % (ref 11.5–14.5)
GLOBULIN SER CALC-MCNC: 2.1 GM/DL
GLUCOSE SERPL-MCNC: 114 MG/DL (ref 65–99)
HBA1C MFR BLD: 5.7 % (ref 4.8–5.6)
HCT VFR BLD AUTO: 53.2 % (ref 42–52)
HCV AB S/CO SERPL IA: <0.1 S/CO RATIO (ref 0–0.9)
HDLC SERPL-MCNC: 62 MG/DL (ref 40–60)
HGB BLD-MCNC: 17.6 G/DL (ref 14–18)
IMM GRANULOCYTES # BLD: 0.02 10*3/MM3 (ref 0–0.03)
IMM GRANULOCYTES NFR BLD: 0.3 % (ref 0–0.5)
LDLC SERPL CALC-MCNC: 84 MG/DL (ref 0–100)
LYMPHOCYTES # BLD AUTO: 0.72 10*3/MM3 (ref 0.6–4.8)
LYMPHOCYTES NFR BLD AUTO: 12.2 % (ref 20–45)
MCH RBC QN AUTO: 29 PG (ref 27–31)
MCHC RBC AUTO-ENTMCNC: 33.1 G/DL (ref 31–37)
MCV RBC AUTO: 87.6 FL (ref 80–94)
MONOCYTES # BLD AUTO: 0.52 10*3/MM3 (ref 0–1)
MONOCYTES NFR BLD AUTO: 8.8 % (ref 3–8)
NEUTROPHILS # BLD AUTO: 4.39 10*3/MM3 (ref 1.5–8.3)
NEUTROPHILS NFR BLD AUTO: 74.1 % (ref 45–70)
NRBC BLD AUTO-RTO: 0 /100 WBC (ref 0–0)
PLATELET # BLD AUTO: 223 10*3/MM3 (ref 140–500)
POTASSIUM SERPL-SCNC: 5 MMOL/L (ref 3.5–5.2)
PROT SERPL-MCNC: 6.5 G/DL (ref 6–8.5)
RBC # BLD AUTO: 6.07 10*6/MM3 (ref 4.7–6.1)
SODIUM SERPL-SCNC: 139 MMOL/L (ref 136–145)
TRIGL SERPL-MCNC: 121 MG/DL (ref 0–150)
TSH SERPL DL<=0.005 MIU/L-ACNC: 0.85 MIU/ML (ref 0.27–4.2)
VIT B12 SERPL-MCNC: 360 PG/ML
VLDLC SERPL CALC-MCNC: 24.2 MG/DL (ref 8–32)
WBC # BLD AUTO: 5.92 10*3/MM3 (ref 4.8–10.8)

## 2018-07-19 ENCOUNTER — OFFICE VISIT (OUTPATIENT)
Dept: INTERNAL MEDICINE | Facility: CLINIC | Age: 68
End: 2018-07-19

## 2018-07-19 VITALS
HEART RATE: 78 BPM | WEIGHT: 243 LBS | TEMPERATURE: 98.6 F | BODY MASS INDEX: 38.14 KG/M2 | HEIGHT: 67 IN | SYSTOLIC BLOOD PRESSURE: 130 MMHG | OXYGEN SATURATION: 98 % | RESPIRATION RATE: 16 BRPM | DIASTOLIC BLOOD PRESSURE: 84 MMHG

## 2018-07-19 DIAGNOSIS — Z85.828 HISTORY OF BASAL CELL CARCINOMA: ICD-10-CM

## 2018-07-19 DIAGNOSIS — N40.1 BENIGN PROSTATIC HYPERPLASIA WITH LOWER URINARY TRACT SYMPTOMS, SYMPTOM DETAILS UNSPECIFIED: ICD-10-CM

## 2018-07-19 DIAGNOSIS — Z00.00 ROUTINE HEALTH MAINTENANCE: ICD-10-CM

## 2018-07-19 DIAGNOSIS — E29.1 HYPOGONADISM IN MALE: ICD-10-CM

## 2018-07-19 DIAGNOSIS — Z23 NEED FOR PNEUMOCOCCAL VACCINATION: ICD-10-CM

## 2018-07-19 DIAGNOSIS — I47.1 SUPRAVENTRICULAR TACHYCARDIA (HCC): ICD-10-CM

## 2018-07-19 DIAGNOSIS — Z86.010 HISTORY OF COLON POLYPS: ICD-10-CM

## 2018-07-19 DIAGNOSIS — E78.5 HYPERLIPIDEMIA, UNSPECIFIED HYPERLIPIDEMIA TYPE: ICD-10-CM

## 2018-07-19 DIAGNOSIS — B35.1 ONYCHOMYCOSIS: Primary | ICD-10-CM

## 2018-07-19 DIAGNOSIS — R73.03 PREDIABETES: ICD-10-CM

## 2018-07-19 PROCEDURE — 90732 PPSV23 VACC 2 YRS+ SUBQ/IM: CPT | Performed by: FAMILY MEDICINE

## 2018-07-19 PROCEDURE — G0009 ADMIN PNEUMOCOCCAL VACCINE: HCPCS | Performed by: FAMILY MEDICINE

## 2018-07-19 PROCEDURE — 99214 OFFICE O/P EST MOD 30 MIN: CPT | Performed by: FAMILY MEDICINE

## 2018-07-19 RX ORDER — TERBINAFINE HYDROCHLORIDE 250 MG/1
250 TABLET ORAL DAILY
Qty: 30 TABLET | Refills: 2 | Status: SHIPPED | OUTPATIENT
Start: 2018-07-19 | End: 2019-01-22

## 2018-07-19 RX ORDER — SIMVASTATIN 40 MG
40 TABLET ORAL DAILY
Qty: 90 TABLET | Refills: 3 | Status: SHIPPED | OUTPATIENT
Start: 2018-07-19 | End: 2019-07-22 | Stop reason: SDUPTHER

## 2018-07-19 NOTE — PROGRESS NOTES
Subjective     Berhane Chaidez is a 68 y.o. male, who presents with a chief complaint of   Chief Complaint   Patient presents with   • Hyperlipidemia     Hyperlipidemia      1. Hyperlipidemia.  Pt tolerates simvastatin.  He exercises and tries to watch his diet.    2. Prediabetes.  He was previously on metformin but now controls this with lifestyle measures.    3. SVT.  He still has a loop recorder and is now on bisoprolol and flecainide.  He reports that he is feeling well.  Denies palpitations.    4. Toenail changes.  He reports thickening of his toenails and yellowness.  He noticed that they improved after two weeks of terbinafine that he took for athlete's foot (per Dr. Donohue).    The following portions of the patient's history were reviewed and updated as appropriate: allergies, current medications, past family history, past medical history, past social history, past surgical history and problem list.    Allergies: Patient has no known allergies.    Review of Systems   Constitutional: Negative.    HENT: Negative.    Eyes: Negative.    Respiratory: Negative.    Cardiovascular: Negative.    Gastrointestinal: Negative.    Endocrine: Negative.    Genitourinary: Negative.    Musculoskeletal: Negative.    Skin: Positive for color change (toenails yellowed and thickened.).   Allergic/Immunologic: Negative.    Neurological: Negative.    Hematological: Negative.    Psychiatric/Behavioral: Negative.      Objective     Wt Readings from Last 3 Encounters:   07/19/18 110 kg (243 lb)   04/12/18 110 kg (243 lb)   01/04/18 108 kg (238 lb 6.4 oz)     Temp Readings from Last 3 Encounters:   07/19/18 98.6 °F (37 °C)   04/12/18 98.3 °F (36.8 °C)   01/04/18 98.8 °F (37.1 °C)     BP Readings from Last 3 Encounters:   07/19/18 130/84   04/12/18 120/80   01/04/18 148/84     Pulse Readings from Last 3 Encounters:   07/19/18 78   04/12/18 67   01/04/18 63     Body mass index is 38.06 kg/m².  SpO2 Readings from Last 3 Encounters:    01/04/18 98%   06/29/17 94%   05/23/17 97%       Physical Exam   Constitutional: He is oriented to person, place, and time. He appears well-developed and well-nourished.   HENT:   Head: Normocephalic and atraumatic.   Eyes: Conjunctivae and EOM are normal.   Neck: Neck supple. No thyromegaly present.   Cardiovascular: Normal rate, regular rhythm and normal heart sounds.    No murmur heard.  Pulmonary/Chest: Effort normal and breath sounds normal.   Abdominal: Soft. There is no tenderness.   Musculoskeletal: Normal range of motion. He exhibits no edema.   Neurological: He is alert and oriented to person, place, and time.   Skin: Skin is warm and dry.   Toenails brittle, yellowed, and thickened.   Psychiatric: He has a normal mood and affect. His behavior is normal.   Nursing note and vitals reviewed.    Assessment/Plan   Berhane was seen today for hyperlipidemia.    Diagnoses and all orders for this visit:    Onychomycosis  -     terbinafine (lamiSIL) 250 MG tablet; Take 1 tablet by mouth Daily.    Hyperlipidemia, unspecified hyperlipidemia type  -     simvastatin (ZOCOR) 40 MG tablet; Take 1 tablet by mouth Daily. AT BEDTIME  -     Comprehensive Metabolic Panel; Future  -     Lipid Panel With / Chol / HDL Ratio; Future    Prediabetes  -     Hemoglobin A1c; Future  -     Vitamin B12; Future    Benign prostatic hyperplasia with lower urinary tract symptoms, symptom details unspecified    Hypogonadism in male  -     CBC & Differential; Future    Routine health maintenance    Supraventricular tachycardia (CMS/HCC)  -     TSH; Future    History of colon polyps    History of basal cell carcinoma    1. Hyperlipidemia.  Well-controlled.  Continue statin and lifestyle measures.    2. Prediabetes.  A1c 5.7, down from 5.9.  Stay off metformin and continue lifestyle measures.     3. BPH.  Symptoms controlled.  Per Dr. Dumont.    4. Hypogonadism.  Has Testopel implants per Dr. Dumont.    5. Routine health maint.  Prevnar UTD.   Pneumovax wasn't done last time so will give today. Colonoscopy UTD.    6. SVT.  He sees Dr. Morgan.  He has a loop recorder in place and is on bisoprolol and flecainide.  Doing well.    7. History of colon polyps.  Next colonoscopy 2019 per Dr. Miller.    9. Hx BCC.  Followed every 6 months by Dr. Donohue.    10.  Onychomycosis of toenails.  Terbinafine X 12 weeks.    Outpatient Medications Prior to Visit   Medication Sig Dispense Refill   • bisoprolol (ZEBeta) 5 MG tablet      • Cholecalciferol (VITAMIN D3) 2000 units capsule      • flecainide (TAMBOCOR) 50 MG tablet      • Garlic 1000 MG capsule      • Glucosamine-Chondroit-Vit C-Mn (GLUCOSAMINE 1500 COMPLEX PO)      • ibuprofen (ADVIL,MOTRIN) 800 MG tablet Take 800 mg by mouth Every 6 (Six) Hours As Needed for Mild Pain (1-3) or Headache (STOPPED FOR SURGERY).     • L-Lysine 1000 MG tablet      • sildenafil (REVATIO) 20 MG tablet      • tamsulosin (FLOMAX) 0.4 MG capsule 24 hr capsule Take 1 capsule by mouth Daily. 90 capsule 3   • Testosterone (TESTOPEL IL) by Implant route. Injections Twice a Year.     • simvastatin (ZOCOR) 40 MG tablet Take 1 tablet by mouth Daily. AT BEDTIME 90 tablet 3     No facility-administered medications prior to visit.      New Medications Ordered This Visit   Medications   • simvastatin (ZOCOR) 40 MG tablet     Sig: Take 1 tablet by mouth Daily. AT BEDTIME     Dispense:  90 tablet     Refill:  3   • terbinafine (lamiSIL) 250 MG tablet     Sig: Take 1 tablet by mouth Daily.     Dispense:  30 tablet     Refill:  2     [unfilled]  Medications Discontinued During This Encounter   Medication Reason   • simvastatin (ZOCOR) 40 MG tablet Reorder       Return in about 6 months (around 1/19/2019).

## 2018-07-24 ENCOUNTER — RESULTS ENCOUNTER (OUTPATIENT)
Dept: INTERNAL MEDICINE | Facility: CLINIC | Age: 68
End: 2018-07-24

## 2018-07-24 DIAGNOSIS — R73.03 PREDIABETES: ICD-10-CM

## 2018-07-24 DIAGNOSIS — E78.5 HYPERLIPIDEMIA, UNSPECIFIED HYPERLIPIDEMIA TYPE: ICD-10-CM

## 2018-07-24 DIAGNOSIS — I47.1 SUPRAVENTRICULAR TACHYCARDIA (HCC): ICD-10-CM

## 2019-01-14 ENCOUNTER — LAB (OUTPATIENT)
Dept: INTERNAL MEDICINE | Facility: CLINIC | Age: 69
End: 2019-01-14

## 2019-01-14 DIAGNOSIS — E29.1 HYPOGONADISM IN MALE: ICD-10-CM

## 2019-01-15 LAB
ALBUMIN SERPL-MCNC: 4.1 G/DL (ref 3.5–5.2)
ALBUMIN/GLOB SERPL: 1.9 G/DL
ALP SERPL-CCNC: 68 U/L (ref 40–129)
ALT SERPL-CCNC: 30 U/L (ref 5–41)
AST SERPL-CCNC: 19 U/L (ref 5–40)
BILIRUB SERPL-MCNC: 0.6 MG/DL (ref 0.2–1.2)
BUN SERPL-MCNC: 13 MG/DL (ref 8–23)
BUN/CREAT SERPL: 13 (ref 7–25)
CALCIUM SERPL-MCNC: 8.8 MG/DL (ref 8.8–10.5)
CHLORIDE SERPL-SCNC: 102 MMOL/L (ref 98–107)
CHOLEST SERPL-MCNC: 151 MG/DL (ref 0–200)
CHOLEST/HDLC SERPL: 2.65 {RATIO}
CO2 SERPL-SCNC: 29.2 MMOL/L (ref 22–29)
CREAT SERPL-MCNC: 1 MG/DL (ref 0.76–1.27)
GLOBULIN SER CALC-MCNC: 2.2 GM/DL
GLUCOSE SERPL-MCNC: 98 MG/DL (ref 65–99)
HBA1C MFR BLD: 5.9 % (ref 4.8–5.6)
HDLC SERPL-MCNC: 57 MG/DL (ref 40–60)
LDLC SERPL CALC-MCNC: 74 MG/DL (ref 0–100)
POTASSIUM SERPL-SCNC: 4.4 MMOL/L (ref 3.5–5.2)
PROT SERPL-MCNC: 6.3 G/DL (ref 6–8.5)
SODIUM SERPL-SCNC: 140 MMOL/L (ref 136–145)
TRIGL SERPL-MCNC: 102 MG/DL (ref 0–150)
TSH SERPL DL<=0.005 MIU/L-ACNC: 0.74 MIU/ML (ref 0.27–4.2)
VIT B12 SERPL-MCNC: 354 PG/ML (ref 211–946)
VLDLC SERPL CALC-MCNC: 20.4 MG/DL (ref 8–32)

## 2019-01-22 ENCOUNTER — OFFICE VISIT (OUTPATIENT)
Dept: INTERNAL MEDICINE | Facility: CLINIC | Age: 69
End: 2019-01-22

## 2019-01-22 ENCOUNTER — PREP FOR SURGERY (OUTPATIENT)
Dept: OTHER | Facility: HOSPITAL | Age: 69
End: 2019-01-22

## 2019-01-22 VITALS
HEART RATE: 65 BPM | OXYGEN SATURATION: 96 % | TEMPERATURE: 98.5 F | SYSTOLIC BLOOD PRESSURE: 136 MMHG | DIASTOLIC BLOOD PRESSURE: 82 MMHG | BODY MASS INDEX: 39.08 KG/M2 | WEIGHT: 249 LBS | RESPIRATION RATE: 16 BRPM | HEIGHT: 67 IN

## 2019-01-22 DIAGNOSIS — Z00.00 ROUTINE HEALTH MAINTENANCE: ICD-10-CM

## 2019-01-22 DIAGNOSIS — B35.1 ONYCHOMYCOSIS: ICD-10-CM

## 2019-01-22 DIAGNOSIS — I47.1 SUPRAVENTRICULAR TACHYCARDIA (HCC): ICD-10-CM

## 2019-01-22 DIAGNOSIS — Z85.828 HISTORY OF BASAL CELL CARCINOMA: ICD-10-CM

## 2019-01-22 DIAGNOSIS — Z86.010 HISTORY OF COLON POLYPS: ICD-10-CM

## 2019-01-22 DIAGNOSIS — N40.1 BENIGN PROSTATIC HYPERPLASIA WITH LOWER URINARY TRACT SYMPTOMS, SYMPTOM DETAILS UNSPECIFIED: ICD-10-CM

## 2019-01-22 DIAGNOSIS — E29.1 HYPOGONADISM IN MALE: ICD-10-CM

## 2019-01-22 DIAGNOSIS — Z86.010 HISTORY OF COLONIC POLYPS: Primary | ICD-10-CM

## 2019-01-22 DIAGNOSIS — R73.03 PREDIABETES: ICD-10-CM

## 2019-01-22 DIAGNOSIS — E78.5 HYPERLIPIDEMIA, UNSPECIFIED HYPERLIPIDEMIA TYPE: Primary | ICD-10-CM

## 2019-01-22 PROCEDURE — 99214 OFFICE O/P EST MOD 30 MIN: CPT | Performed by: FAMILY MEDICINE

## 2019-01-22 NOTE — PROGRESS NOTES
Subjective     Berhane Chaidez is a 68 y.o. male, who presents with a chief complaint of   Chief Complaint   Patient presents with   • Hyperlipidemia     Hyperlipidemia      1. Hyperlipidemia.  Pt tolerates simvastatin.  He exercises and tries to watch his diet.    2. Prediabetes.  He was previously on metformin but now controls this with lifestyle measures.  He gained some weight over the holidays but is working on this.    3. SVT.  He still has a loop recorder and is now on bisoprolol and flecainide.  He reports that he is feeling well.  Denies palpitations.    The following portions of the patient's history were reviewed and updated as appropriate: allergies, current medications, past family history, past medical history, past social history, past surgical history and problem list.    Allergies: Patient has no known allergies.    Review of Systems   Constitutional: Negative.    HENT: Negative.    Eyes: Negative.    Respiratory: Negative.    Cardiovascular: Negative.    Gastrointestinal: Negative.    Endocrine: Negative.    Genitourinary: Negative.    Musculoskeletal: Negative.    Skin: Negative.    Allergic/Immunologic: Negative.    Neurological: Negative.    Hematological: Negative.    Psychiatric/Behavioral: Negative.      Objective     Wt Readings from Last 3 Encounters:   01/22/19 113 kg (249 lb)   07/19/18 110 kg (243 lb)   04/12/18 110 kg (243 lb)     Temp Readings from Last 3 Encounters:   01/22/19 98.5 °F (36.9 °C)   07/19/18 98.6 °F (37 °C)   04/12/18 98.3 °F (36.8 °C)     BP Readings from Last 3 Encounters:   01/22/19 136/82   07/19/18 130/84   04/12/18 120/80     Pulse Readings from Last 3 Encounters:   01/22/19 65   07/19/18 78   04/12/18 67     Body mass index is 39 kg/m².  SpO2 Readings from Last 3 Encounters:   01/04/18 98%   06/29/17 94%   05/23/17 97%       Physical Exam   Constitutional: He is oriented to person, place, and time. He appears well-developed and well-nourished.   HENT:   Head:  Normocephalic and atraumatic.   Eyes: Conjunctivae and EOM are normal.   Neck: Neck supple. No thyromegaly present.   Cardiovascular: Normal rate, regular rhythm and normal heart sounds.   No murmur heard.  Pulmonary/Chest: Effort normal and breath sounds normal.   Abdominal: Soft. There is no tenderness.   Musculoskeletal: Normal range of motion. He exhibits no edema.   Neurological: He is alert and oriented to person, place, and time.   Skin: Skin is warm and dry.   Toenails brittle, yellowed, and thickened.   Psychiatric: He has a normal mood and affect. His behavior is normal.   Nursing note and vitals reviewed.    Assessment/Plan   Berhane was seen today for hyperlipidemia.    Diagnoses and all orders for this visit:    Hyperlipidemia, unspecified hyperlipidemia type  -     Comprehensive Metabolic Panel; Future  -     Lipid Panel With / Chol / HDL Ratio; Future  -     TSH; Future    Prediabetes  -     Hemoglobin A1c; Future  -     Vitamin B12; Future    Benign prostatic hyperplasia with lower urinary tract symptoms, symptom details unspecified    Hypogonadism in male  -     CBC & Differential; Future    Routine health maintenance  -     PSA DIAGNOSTIC; Future    Supraventricular tachycardia (CMS/HCC)    History of colon polyps    History of basal cell carcinoma    Onychomycosis    1. Hyperlipidemia.  Well-controlled.  Continue statin and lifestyle measures.    2. Prediabetes.  A1c 5.9, up from 5.7.  Stay off metformin and continue lifestyle measures.     3. BPH.  Symptoms controlled.  Per Dr. Dumont.    4. Hypogonadism.  Has Testopel implants per Dr. Dumont.    5. Routine health maint.  Pneumococcal vaccines UTD.  Zoster and flu vaccines UTD.     6. SVT.  He sees Dr. Morgan.  He has a loop recorder in place and is on bisoprolol and flecainide.  Doing well.    7. History of colon polyps.  Next colonoscopy this year.  He is working on scheduling this.    9. Hx BCC.  Followed every 6 months by Dr. Donohue.    10.   Onychomycosis of toenails. Resolved with terbinafine.    Outpatient Medications Prior to Visit   Medication Sig Dispense Refill   • bisoprolol (ZEBeta) 5 MG tablet      • Cholecalciferol (VITAMIN D3) 2000 units capsule      • flecainide (TAMBOCOR) 50 MG tablet      • Garlic 1000 MG capsule      • Glucosamine-Chondroit-Vit C-Mn (GLUCOSAMINE 1500 COMPLEX PO)      • L-Lysine 1000 MG tablet      • sildenafil (REVATIO) 20 MG tablet      • simvastatin (ZOCOR) 40 MG tablet Take 1 tablet by mouth Daily. AT BEDTIME 90 tablet 3   • tamsulosin (FLOMAX) 0.4 MG capsule 24 hr capsule Take 1 capsule by mouth Daily. 90 capsule 3   • Testosterone (TESTOPEL IL) by Implant route. Injections Twice a Year.     • ibuprofen (ADVIL,MOTRIN) 800 MG tablet Take 800 mg by mouth Every 6 (Six) Hours As Needed for Mild Pain (1-3) or Headache (STOPPED FOR SURGERY).     • terbinafine (lamiSIL) 250 MG tablet Take 1 tablet by mouth Daily. 30 tablet 2     No facility-administered medications prior to visit.      No orders of the defined types were placed in this encounter.    [unfilled]  Medications Discontinued During This Encounter   Medication Reason   • ibuprofen (ADVIL,MOTRIN) 800 MG tablet *Therapy completed   • terbinafine (lamiSIL) 250 MG tablet *Therapy completed       Return in about 6 months (around 7/22/2019).

## 2019-01-28 ENCOUNTER — PREP FOR SURGERY (OUTPATIENT)
Dept: OTHER | Facility: HOSPITAL | Age: 69
End: 2019-01-28

## 2019-01-28 DIAGNOSIS — D36.9 ADENOMATOUS POLYP: Primary | ICD-10-CM

## 2019-02-06 DIAGNOSIS — N40.1 BENIGN NON-NODULAR PROSTATIC HYPERPLASIA WITH LOWER URINARY TRACT SYMPTOMS: ICD-10-CM

## 2019-02-06 PROBLEM — D36.9 ADENOMATOUS POLYP: Status: ACTIVE | Noted: 2019-02-06

## 2019-02-06 RX ORDER — TAMSULOSIN HYDROCHLORIDE 0.4 MG/1
CAPSULE ORAL
Qty: 90 CAPSULE | Refills: 3 | Status: SHIPPED | OUTPATIENT
Start: 2019-02-06 | End: 2020-03-16 | Stop reason: SDUPTHER

## 2019-03-06 ENCOUNTER — HOSPITAL ENCOUNTER (OUTPATIENT)
Facility: HOSPITAL | Age: 69
Setting detail: HOSPITAL OUTPATIENT SURGERY
Discharge: HOME | End: 2019-03-06
Attending: INTERNAL MEDICINE | Admitting: INTERNAL MEDICINE

## 2019-03-06 ENCOUNTER — ANESTHESIA (OUTPATIENT)
Dept: GASTROENTEROLOGY | Facility: HOSPITAL | Age: 69
End: 2019-03-06

## 2019-03-06 ENCOUNTER — ANESTHESIA EVENT (OUTPATIENT)
Dept: GASTROENTEROLOGY | Facility: HOSPITAL | Age: 69
End: 2019-03-06

## 2019-03-06 VITALS
BODY MASS INDEX: 36.68 KG/M2 | HEIGHT: 68 IN | SYSTOLIC BLOOD PRESSURE: 117 MMHG | DIASTOLIC BLOOD PRESSURE: 77 MMHG | WEIGHT: 242 LBS | OXYGEN SATURATION: 94 % | HEART RATE: 67 BPM | TEMPERATURE: 97.7 F | RESPIRATION RATE: 16 BRPM

## 2019-03-06 DIAGNOSIS — D36.9 ADENOMATOUS POLYP: ICD-10-CM

## 2019-03-06 PROCEDURE — S0260 H&P FOR SURGERY: HCPCS | Performed by: INTERNAL MEDICINE

## 2019-03-06 PROCEDURE — 88305 TISSUE EXAM BY PATHOLOGIST: CPT | Performed by: INTERNAL MEDICINE

## 2019-03-06 PROCEDURE — 45380 COLONOSCOPY AND BIOPSY: CPT | Performed by: INTERNAL MEDICINE

## 2019-03-06 PROCEDURE — 25010000002 PROPOFOL 10 MG/ML EMULSION: Performed by: ANESTHESIOLOGY

## 2019-03-06 PROCEDURE — 45385 COLONOSCOPY W/LESION REMOVAL: CPT | Performed by: INTERNAL MEDICINE

## 2019-03-06 RX ORDER — SODIUM CHLORIDE, SODIUM LACTATE, POTASSIUM CHLORIDE, CALCIUM CHLORIDE 600; 310; 30; 20 MG/100ML; MG/100ML; MG/100ML; MG/100ML
1000 INJECTION, SOLUTION INTRAVENOUS CONTINUOUS
Status: DISCONTINUED | OUTPATIENT
Start: 2019-03-06 | End: 2019-03-06 | Stop reason: HOSPADM

## 2019-03-06 RX ORDER — LIDOCAINE HYDROCHLORIDE 20 MG/ML
INJECTION, SOLUTION INFILTRATION; PERINEURAL AS NEEDED
Status: DISCONTINUED | OUTPATIENT
Start: 2019-03-06 | End: 2019-03-06 | Stop reason: SURG

## 2019-03-06 RX ORDER — PROPOFOL 10 MG/ML
VIAL (ML) INTRAVENOUS AS NEEDED
Status: DISCONTINUED | OUTPATIENT
Start: 2019-03-06 | End: 2019-03-06 | Stop reason: SURG

## 2019-03-06 RX ORDER — PROPOFOL 10 MG/ML
VIAL (ML) INTRAVENOUS CONTINUOUS PRN
Status: DISCONTINUED | OUTPATIENT
Start: 2019-03-06 | End: 2019-03-06 | Stop reason: SURG

## 2019-03-06 RX ADMIN — PROPOFOL 120 MCG/KG/MIN: 10 INJECTION, EMULSION INTRAVENOUS at 08:15

## 2019-03-06 RX ADMIN — LIDOCAINE HYDROCHLORIDE 50 MG: 20 INJECTION, SOLUTION INFILTRATION; PERINEURAL at 08:12

## 2019-03-06 RX ADMIN — SODIUM CHLORIDE, POTASSIUM CHLORIDE, SODIUM LACTATE AND CALCIUM CHLORIDE 1000 ML: 600; 310; 30; 20 INJECTION, SOLUTION INTRAVENOUS at 07:24

## 2019-03-06 RX ADMIN — PROPOFOL 200 MG: 10 INJECTION, EMULSION INTRAVENOUS at 08:12

## 2019-03-06 NOTE — ANESTHESIA POSTPROCEDURE EVALUATION
Patient: Berhane Chaidez    Procedure Summary     Date:  03/06/19 Room / Location:   [facility]    Anesthesia Start:  0808 Anesthesia Stop:  0846    Procedure:  COLONOSCOPY TO CECUM AND TI WITH HOT AND COLD POLYPECTOMIES (N/A ) Diagnosis:       Adenomatous polyp      (Adenomatous polyp [D36.9])    Surgeon:  Ana Dai MD Provider:  Gissell Ornelas MD    Anesthesia Type:  MAC ASA Status:  3          Anesthesia Type: MAC  Last vitals  BP   117/77 (03/06/19 0906)   Temp   36.5 °C (97.7 °F) (03/06/19 0710)   Pulse   67 (03/06/19 0906)   Resp   16 (03/06/19 0906)     SpO2   94 % (03/06/19 0906)     Post Anesthesia Care and Evaluation      Comments: Patient discharged before being evaluated by an Anesthesiologist.  No apparent complications per the record.  THIS CASE WAS NOT MEDICALLY DIRECTED

## 2019-03-06 NOTE — H&P
[facility]  Pre Procedure History & Physical    Chief Complaint:   Hx adenomatous polyps    Subjective     HPI:   67 yo for surveillance c/s.  Last exam 2014 with one adenomatous polyp.  NO FH CRC/polyps.  No GI symptoms    Past Medical History:   Past Medical History:   Diagnosis Date   • Arthritis     OSTEO   • Diabetes mellitus (CMS/HCC)     PRE.   • Diverticular disease    • ED (erectile dysfunction)    • History of kidney stones    • Hyperlipidemia    • Kidney stone    • Low testosterone    • MRSA infection     HISTORY OF ON HAND. DR NEVILLE TREATED WITH TOPICAL CREAM SEVERAL YEARS AGO   • Right knee pain        Past Surgical History:  Past Surgical History:   Procedure Laterality Date   • CARPAL TUNNEL RELEASE Right    • CATARACT EXTRACTION     • CATARACT EXTRACTION      LEFT AND RIGHT   • COLONOSCOPY     • INCARCERATED HERNIA REPAIR      History of Incarcerated Umbilical Hernia Repair for Person Over Age 5    • KS TOTAL KNEE ARTHROPLASTY Right 5/22/2017    Procedure: RT TOTAL KNEE ARTHROPLASTY;  Surgeon: Rober Andrews MD;  Location: [facility];  Service: Orthopedics   • TONSILLECTOMY     • TOTAL HIP ARTHROPLASTY      LEFT 2003 RIGHT 2013   • TOTAL SHOULDER ARTHROPLASTY      RIGHT 2012 AND LEFT 2013   • WISDOM TOOTH EXTRACTION      X1       Family History:  Family History   Problem Relation Age of Onset   • Heart disease Father    • Hypertension Father    • Multiple sclerosis Son        Social History:   reports that he quit smoking about 45 years ago. he has never used smokeless tobacco. He reports that he drinks about 8.4 oz of alcohol per week. He reports that he does not use drugs.    Medications:   Medications Prior to Admission   Medication Sig Dispense Refill Last Dose   • bisoprolol (ZEBeta) 5 MG tablet    3/6/2019 at Unknown time   • flecainide (TAMBOCOR) 50 MG tablet    3/6/2019 at Unknown time   • simvastatin (ZOCOR) 40 MG tablet Take 1 tablet by mouth Daily. AT  "BEDTIME 90 tablet 3 3/5/2019 at Unknown time   • tamsulosin (FLOMAX) 0.4 MG capsule 24 hr capsule TAKE 1 CAPSULE DAILY 90 capsule 3 3/5/2019 at Unknown time   • Cholecalciferol (VITAMIN D3) 2000 units capsule    3/4/2019   • Garlic 1000 MG capsule    3/4/2019   • Glucosamine-Chondroit-Vit C-Mn (GLUCOSAMINE 1500 COMPLEX PO)    3/4/2019   • L-Lysine 1000 MG tablet    3/4/2019   • sildenafil (REVATIO) 20 MG tablet    2/27/2019   • Testosterone (TESTOPEL IL) by Implant route. Injections Twice a Year.   11/6/2018       Allergies:  Patient has no known allergies.    ROS:    Pertinent items are noted in HPI, all other systems reviewed and negative     Objective     Blood pressure 166/86, pulse 72, temperature 97.7 °F (36.5 °C), temperature source Oral, resp. rate (!) 30, height 172.7 cm (68\"), weight 110 kg (242 lb), SpO2 94 %.    Physical Exam   Constitutional: Pt is oriented to person, place, and time and well-developed, well-nourished, and in no distress.   Mouth/Throat: Oropharynx is clear and moist.   Neck: Normal range of motion.   Cardiovascular: Normal rate, regular rhythm  Pulmonary/Chest: Effort normal   Abdominal: Soft. Nontender  Skin: Skin is warm and dry.   Psychiatric: Mood, memory, affect and judgment normal.     Assessment/Plan     Diagnosis:  Hx adenomatous polyps    Anticipated Surgical Procedure:  colonoscopy    The risks, benefits, and alternatives of this procedure have been discussed with the patient or the responsible party- the patient understands and agrees to proceed.                                                            "

## 2019-03-06 NOTE — DISCHARGE INSTRUCTIONS
Dr. Dai   [phone]    Colonoscopy, Adult, Care After    This sheet gives you information about how to care for yourself after your procedure. Your doctor may also give you more specific instructions. If you have problems or questions, call your doctor.  Follow these instructions at home:  General instructions    · For the first 24 hours after the procedure:  ? Do not drive or use machinery.  ? Do not sign important documents.  ? Do not drink alcohol.  ? Do your daily activities more slowly than normal.  ? Eat foods that are soft and easy to digest.  ? Rest often.  · Take over-the-counter or prescription medicines only as told by your doctor.  · It is up to you to get the results of your procedure. Ask your doctor, or the department performing the procedure, when your results will be ready.  To help cramping and bloating:  · Try walking around.  · Put heat on your belly (abdomen) as told by your doctor. Use a heat source that your doctor recommends, such as a moist heat pack or a heating pad.  ? Put a towel between your skin and the heat source.  ? Leave the heat on for 20-30 minutes.  ? Remove the heat if your skin turns bright red. This is especially important if you cannot feel pain, heat, or cold. You can get burned.  Eating and drinking  · Drink enough fluid to keep your pee (urine) clear or pale yellow.  · Return to your normal diet as told by your doctor. Avoid heavy or fried foods that are hard to digest.  · Avoid drinking alcohol for as long as told by your doctor.  Contact a doctor if:  · You have blood in your poop (stool) 2-3 days after the procedure.  Get help right away if:  · You have more than a small amount of blood in your poop.  · You see large clumps of tissue (blood clots) in your poop.  · Your belly is swollen.  · You feel sick to your stomach (nauseous).  · You throw up (vomit).  · You have a fever.  · You have belly pain that gets worse, and medicine does not help your pain.  This  information is not intended to replace advice given to you by your health care provider. Make sure you discuss any questions you have with your health care provider.  Document Released: 01/20/2012 Document Revised: 09/11/2017 Document Reviewed: 09/11/2017  [employer] Interactive Patient Education © 2017 [employer]    Diverticulosis    Diverticulosis is a condition that develops when small pouches (diverticula) form in the wall of the large intestine (colon). The colon is where water is absorbed and stool is formed. The pouches form when the inside layer of the colon pushes through weak spots in the outer layers of the colon. You may have a few pouches or many of them.  What are the causes?  The cause of this condition is not known.  What increases the risk?  The following factors may make you more likely to develop this condition:  · Being older than age 60. Your risk for this condition increases with age. Diverticulosis is rare among people younger than age 30. By age 80, many people have it.  · Eating a low-fiber diet.  · Having frequent constipation.  · Being overweight.  · Not getting enough exercise.  · Smoking.  · Taking over-the-counter pain medicines, like aspirin and ibuprofen.  · Having a family history of diverticulosis.    What are the signs or symptoms?  In most people, there are no symptoms of this condition. If you do have symptoms, they may include:  · Bloating.  · Cramps in the abdomen.  · Constipation or diarrhea.  · Pain in the lower left side of the abdomen.    How is this diagnosed?  This condition is most often diagnosed during an exam for other colon problems. Because diverticulosis usually has no symptoms, it often cannot be diagnosed independently. This condition may be diagnosed by:  · Using a flexible scope to examine the colon (colonoscopy).  · Taking an X-ray of the colon after dye has been put into the colon (barium enema).  · Doing a CT scan.    How is this treated?  You may not need  treatment for this condition if you have never developed an infection related to diverticulosis. If you have had an infection before, treatment may include:  · Eating a high-fiber diet. This may include eating more fruits, vegetables, and grains.  · Taking a fiber supplement.  · Taking a live bacteria supplement (probiotic).  · Taking medicine to relax your colon.  · Taking antibiotic medicines.    Follow these instructions at home:  · Drink 6-8 glasses of water or more each day to prevent constipation.  · Try not to strain when you have a bowel movement.  · If you have had an infection before:  ? Eat more fiber as directed by your health care provider or your diet and nutrition specialist (dietitian).  ? Take a fiber supplement or probiotic, if your health care provider approves.  · Take over-the-counter and prescription medicines only as told by your health care provider.  · If you were prescribed an antibiotic, take it as told by your health care provider. Do not stop taking the antibiotic even if you start to feel better.  · Keep all follow-up visits as told by your health care provider. This is important.  Contact a health care provider if:  · You have pain in your abdomen.  · You have bloating.  · You have cramps.  · You have not had a bowel movement in 3 days.  Get help right away if:  · Your pain gets worse.  · Your bloating becomes very bad.  · You have a fever or chills, and your symptoms suddenly get worse.  · You vomit.  · You have bowel movements that are bloody or black.  · You have bleeding from your rectum.  Summary  · Diverticulosis is a condition that develops when small pouches (diverticula) form in the wall of the large intestine (colon).  · You may have a few pouches or many of them.  · This condition is most often diagnosed during an exam for other colon problems.  · If you have had an infection related to diverticulosis, treatment may include increasing the fiber in your diet, taking  supplements, or taking medicines.  This information is not intended to replace advice given to you by your health care provider. Make sure you discuss any questions you have with your health care provider.  Document Released: 09/14/2005 Document Revised: 11/06/2017 Document Reviewed: 11/06/2017  [employer] Patient Education © 2017 [employer]    Colon Polyps    Polyps are tissue growths inside the body. Polyps can grow in many places, including the large intestine (colon). A polyp may be a round bump or a mushroom-shaped growth. You could have one polyp or several.  Most colon polyps are noncancerous (benign). However, some colon polyps can become cancerous over time.  What are the causes?  The exact cause of colon polyps is not known.  What increases the risk?  This condition is more likely to develop in people who:  · Have a family history of colon cancer or colon polyps.  · Are older than 50 or older than 45 if they are .  · Have inflammatory bowel disease, such as ulcerative colitis or Crohn disease.  · Are overweight.  · Smoke cigarettes.  · Do not get enough exercise.  · Drink too much alcohol.  · Eat a diet that is:  ? High in fat and red meat.  ? Low in fiber.  · Had childhood cancer that was treated with abdominal radiation.    What are the signs or symptoms?  Most polyps do not cause symptoms. If you have symptoms, they may include:  · Blood coming from your rectum when having a bowel movement.  · Blood in your stool. The stool may look dark red or black.  · A change in bowel habits, such as constipation or diarrhea.    How is this diagnosed?  This condition is diagnosed with a colonoscopy. This is a procedure that uses a lighted, flexible scope to look at the inside of your colon.  How is this treated?  Treatment for this condition involves removing any polyps that are found. Those polyps will then be tested for cancer. If cancer is found, your health care provider will talk to  you about options for colon cancer treatment.  Follow these instructions at home:  Diet  · Eat plenty of fiber, such as fruits, vegetables, and whole grains.  · Eat foods that are high in calcium and vitamin D, such as milk, cheese, yogurt, eggs, liver, fish, and broccoli.  · Limit foods high in fat, red meats, and processed meats, such as hot dogs, sausage, fernandez, and lunch meats.  · Maintain a healthy weight, or lose weight if recommended by your health care provider.  General instructions  · Do not smoke cigarettes.  · Do not drink alcohol excessively.  · Keep all follow-up visits as told by your health care provider. This is important. This includes keeping regularly scheduled colonoscopies. Talk to your health care provider about when you need a colonoscopy.  · Exercise every day or as told by your health care provider.  Contact a health care provider if:  · You have new or worsening bleeding during a bowel movement.  · You have new or increased blood in your stool.  · You have a change in bowel habits.  · You unexpectedly lose weight.  This information is not intended to replace advice given to you by your health care provider. Make sure you discuss any questions you have with your health care provider.  Document Released: 09/13/2005 Document Revised: 05/25/2017 Document Reviewed: 11/07/2016  [employer] Patient Education © 2018 [employer]

## 2019-03-06 NOTE — ANESTHESIA PREPROCEDURE EVALUATION
Anesthesia Evaluation     Patient summary reviewed and Nursing notes reviewed   no history of anesthetic complications:  NPO Solid Status: > 8 hours  NPO Liquid Status: > 4 hours           Airway   Mallampati: II  TM distance: >3 FB  Neck ROM: full  No difficulty expected  Dental          Pulmonary - normal exam    breath sounds clear to auscultation  (+) a smoker Former,   Cardiovascular - normal exam    Rhythm: regular  Rate: normal    (+) dysrhythmias Tachycardia, hyperlipidemia,       Neuro/Psych  (-) psychiatric history  GI/Hepatic/Renal/Endo    (+) obesity,   diabetes mellitus,     ROS Comment: Erectile dysfunction    Musculoskeletal     Abdominal   (+) obese,    Substance History - negative use     OB/GYN negative ob/gyn ROS         Other   (+) arthritis   history of cancer remission                  Anesthesia Plan    ASA 3     MAC     intravenous induction   Anesthetic plan, all risks, benefits, and alternatives have been provided, discussed and informed consent has been obtained with: patient.

## 2019-03-07 LAB
CYTO UR: NORMAL
LAB AP CASE REPORT: NORMAL
PATH REPORT.FINAL DX SPEC: NORMAL
PATH REPORT.GROSS SPEC: NORMAL

## 2019-03-08 ENCOUNTER — TELEPHONE (OUTPATIENT)
Dept: GASTROENTEROLOGY | Facility: CLINIC | Age: 69
End: 2019-03-08

## 2019-03-08 NOTE — TELEPHONE ENCOUNTER
Called pt and advised per Dr Dai that the polyp bx showed adenomatous change.  This is not cancerous but is considered potentially precancerous .  She recommends a repeat c/s in 3 yrs.  Pt verb understanding.       C/s placed in recall for 03/06/2022.

## 2019-07-15 ENCOUNTER — LAB (OUTPATIENT)
Dept: INTERNAL MEDICINE | Facility: CLINIC | Age: 69
End: 2019-07-15

## 2019-07-15 DIAGNOSIS — R73.03 PREDIABETES: ICD-10-CM

## 2019-07-15 DIAGNOSIS — E78.5 HYPERLIPIDEMIA, UNSPECIFIED HYPERLIPIDEMIA TYPE: ICD-10-CM

## 2019-07-15 DIAGNOSIS — Z00.00 ROUTINE HEALTH MAINTENANCE: ICD-10-CM

## 2019-07-15 DIAGNOSIS — E29.1 HYPOGONADISM IN MALE: ICD-10-CM

## 2019-07-15 LAB
ALBUMIN SERPL-MCNC: 4.6 G/DL (ref 3.5–5.2)
ALBUMIN/GLOB SERPL: 3.1 G/DL
ALP SERPL-CCNC: 64 U/L (ref 39–117)
ALT SERPL-CCNC: 22 U/L (ref 1–41)
AST SERPL-CCNC: 17 U/L (ref 1–40)
BASOPHILS # BLD AUTO: 0.04 10*3/MM3 (ref 0–0.2)
BASOPHILS NFR BLD AUTO: 0.7 % (ref 0–1.5)
BILIRUB SERPL-MCNC: 0.6 MG/DL (ref 0.2–1.2)
BUN SERPL-MCNC: 15 MG/DL (ref 8–23)
BUN/CREAT SERPL: 13.2 (ref 7–25)
CALCIUM SERPL-MCNC: 9 MG/DL (ref 8.6–10.5)
CHLORIDE SERPL-SCNC: 101 MMOL/L (ref 98–107)
CHOLEST SERPL-MCNC: 133 MG/DL (ref 0–200)
CHOLEST/HDLC SERPL: 2.66 {RATIO}
CO2 SERPL-SCNC: 27.1 MMOL/L (ref 22–29)
CREAT SERPL-MCNC: 1.14 MG/DL (ref 0.76–1.27)
EOSINOPHIL # BLD AUTO: 0.13 10*3/MM3 (ref 0–0.4)
EOSINOPHIL NFR BLD AUTO: 2.3 % (ref 0.3–6.2)
ERYTHROCYTE [DISTWIDTH] IN BLOOD BY AUTOMATED COUNT: 13.9 % (ref 12.3–15.4)
GLOBULIN SER CALC-MCNC: 1.5 GM/DL
GLUCOSE SERPL-MCNC: 99 MG/DL (ref 65–99)
HBA1C MFR BLD: 5.7 % (ref 4.8–5.6)
HCT VFR BLD AUTO: 53.4 % (ref 37.5–51)
HDLC SERPL-MCNC: 50 MG/DL (ref 40–60)
HGB BLD-MCNC: 17.4 G/DL (ref 13–17.7)
IMM GRANULOCYTES # BLD AUTO: 0.02 10*3/MM3 (ref 0–0.05)
IMM GRANULOCYTES NFR BLD AUTO: 0.4 % (ref 0–0.5)
LDLC SERPL CALC-MCNC: 66 MG/DL (ref 0–100)
LYMPHOCYTES # BLD AUTO: 0.76 10*3/MM3 (ref 0.7–3.1)
LYMPHOCYTES NFR BLD AUTO: 13.5 % (ref 19.6–45.3)
MCH RBC QN AUTO: 29 PG (ref 26.6–33)
MCHC RBC AUTO-ENTMCNC: 32.6 G/DL (ref 31.5–35.7)
MCV RBC AUTO: 88.9 FL (ref 79–97)
MONOCYTES # BLD AUTO: 0.52 10*3/MM3 (ref 0.1–0.9)
MONOCYTES NFR BLD AUTO: 9.3 % (ref 5–12)
NEUTROPHILS # BLD AUTO: 4.15 10*3/MM3 (ref 1.7–7)
NEUTROPHILS NFR BLD AUTO: 73.8 % (ref 42.7–76)
NRBC BLD AUTO-RTO: 0 /100 WBC (ref 0–0.2)
PLATELET # BLD AUTO: 220 10*3/MM3 (ref 140–450)
POTASSIUM SERPL-SCNC: 4.9 MMOL/L (ref 3.5–5.2)
PROT SERPL-MCNC: 6.1 G/DL (ref 6–8.5)
PSA SERPL-MCNC: 0.83 NG/ML (ref 0–4)
RBC # BLD AUTO: 6.01 10*6/MM3 (ref 4.14–5.8)
SODIUM SERPL-SCNC: 140 MMOL/L (ref 136–145)
TRIGL SERPL-MCNC: 84 MG/DL (ref 0–150)
TSH SERPL DL<=0.005 MIU/L-ACNC: 0.68 MIU/ML (ref 0.27–4.2)
VIT B12 SERPL-MCNC: 325 PG/ML (ref 211–946)
VLDLC SERPL CALC-MCNC: 16.8 MG/DL
WBC # BLD AUTO: 5.62 10*3/MM3 (ref 3.4–10.8)

## 2019-07-22 ENCOUNTER — OFFICE VISIT (OUTPATIENT)
Dept: INTERNAL MEDICINE | Facility: CLINIC | Age: 69
End: 2019-07-22

## 2019-07-22 VITALS
TEMPERATURE: 98 F | RESPIRATION RATE: 16 BRPM | SYSTOLIC BLOOD PRESSURE: 130 MMHG | HEART RATE: 70 BPM | WEIGHT: 245 LBS | OXYGEN SATURATION: 96 % | BODY MASS INDEX: 36.29 KG/M2 | DIASTOLIC BLOOD PRESSURE: 78 MMHG | HEIGHT: 69 IN

## 2019-07-22 DIAGNOSIS — Z86.010 HISTORY OF COLON POLYPS: ICD-10-CM

## 2019-07-22 DIAGNOSIS — Z85.828 HISTORY OF BASAL CELL CARCINOMA: ICD-10-CM

## 2019-07-22 DIAGNOSIS — N40.1 BENIGN PROSTATIC HYPERPLASIA WITH LOWER URINARY TRACT SYMPTOMS, SYMPTOM DETAILS UNSPECIFIED: ICD-10-CM

## 2019-07-22 DIAGNOSIS — Z00.00 ROUTINE HEALTH MAINTENANCE: ICD-10-CM

## 2019-07-22 DIAGNOSIS — I47.1 SUPRAVENTRICULAR TACHYCARDIA (HCC): ICD-10-CM

## 2019-07-22 DIAGNOSIS — E29.1 HYPOGONADISM IN MALE: ICD-10-CM

## 2019-07-22 DIAGNOSIS — R73.03 PREDIABETES: ICD-10-CM

## 2019-07-22 DIAGNOSIS — E78.5 HYPERLIPIDEMIA, UNSPECIFIED HYPERLIPIDEMIA TYPE: Primary | ICD-10-CM

## 2019-07-22 PROCEDURE — 99214 OFFICE O/P EST MOD 30 MIN: CPT | Performed by: FAMILY MEDICINE

## 2019-07-22 RX ORDER — SIMVASTATIN 40 MG
40 TABLET ORAL DAILY
Qty: 90 TABLET | Refills: 3 | Status: SHIPPED | OUTPATIENT
Start: 2019-07-22 | End: 2020-08-03 | Stop reason: SDUPTHER

## 2019-07-22 NOTE — PROGRESS NOTES
Subjective     Berhane Chaidez is a 69 y.o. male, who presents with a chief complaint of   Chief Complaint   Patient presents with   • Hyperlipidemia   • Diabetes   • Hypertension     Hyperlipidemia      1. Hyperlipidemia.  Pt tolerates simvastatin.  He exercises and tries to watch his diet.    2. Prediabetes.  He was previously on metformin but now controls this with lifestyle measures.  He gained some weight over the holidays but is working on this.    3. SVT.  He still has a loop recorder and is now on bisoprolol and flecainide.  He reports that he is feeling well.  Denies palpitations.    The following portions of the patient's history were reviewed and updated as appropriate: allergies, current medications, past family history, past medical history, past social history, past surgical history and problem list.    Allergies: Patient has no known allergies.    Review of Systems   Constitutional: Negative.    HENT: Negative.    Eyes: Negative.    Respiratory: Negative.    Cardiovascular: Negative.    Gastrointestinal: Negative.    Endocrine: Negative.    Genitourinary: Negative.    Musculoskeletal: Negative.    Skin: Negative.    Allergic/Immunologic: Negative.    Neurological: Negative.    Hematological: Negative.    Psychiatric/Behavioral: Negative.      Objective     Wt Readings from Last 3 Encounters:   07/22/19 111 kg (245 lb)   03/06/19 110 kg (242 lb)   01/22/19 113 kg (249 lb)     Temp Readings from Last 3 Encounters:   07/22/19 98 °F (36.7 °C) (Oral)   03/06/19 97.7 °F (36.5 °C) (Oral)   01/22/19 98.5 °F (36.9 °C)     BP Readings from Last 3 Encounters:   07/22/19 130/78   03/06/19 117/77   01/22/19 136/82     Pulse Readings from Last 3 Encounters:   07/22/19 70   03/06/19 67   01/22/19 65     Body mass index is 36.18 kg/m².  SpO2 Readings from Last 3 Encounters:   01/04/18 98%   06/29/17 94%   05/23/17 97%       Physical Exam   Constitutional: He is oriented to person, place, and time. He appears  well-developed and well-nourished.   HENT:   Head: Normocephalic and atraumatic.   Eyes: Conjunctivae and EOM are normal.   Neck: Neck supple. No thyromegaly present.   Cardiovascular: Normal rate, regular rhythm and normal heart sounds.   No murmur heard.  Pulmonary/Chest: Effort normal and breath sounds normal.   Abdominal: Soft. There is no tenderness.   Musculoskeletal: Normal range of motion. He exhibits no edema.   Neurological: He is alert and oriented to person, place, and time.   Skin: Skin is warm and dry.   Toenails brittle, yellowed, and thickened.   Psychiatric: He has a normal mood and affect. His behavior is normal.   Nursing note and vitals reviewed.    Assessment/Plan   Berhane was seen today for hyperlipidemia, diabetes and hypertension.    Diagnoses and all orders for this visit:    Hyperlipidemia, unspecified hyperlipidemia type  -     Comprehensive Metabolic Panel; Future  -     Lipid Panel With / Chol / HDL Ratio; Future  -     TSH; Future  -     simvastatin (ZOCOR) 40 MG tablet; Take 1 tablet by mouth Daily. AT BEDTIME    Prediabetes  -     Hemoglobin A1c; Future  -     Vitamin B12; Future    Benign prostatic hyperplasia with lower urinary tract symptoms, symptom details unspecified    Hypogonadism in male    Routine health maintenance    Supraventricular tachycardia (CMS/HCC)    History of colon polyps  -     CBC & Differential; Future    History of basal cell carcinoma    1. Hyperlipidemia.  Well-controlled.  Continue statin and lifestyle measures.    2. Prediabetes.  A1c 5.7. Down from 5.9.  Stay off metformin and continue lifestyle measures.     3. BPH.  Symptoms controlled.  Per Dr. Dumont.    4. Hypogonadism.  Has Testopel implants per Dr. Dumont.    5. Routine health maint.  Pneumococcal vaccines UTD.  Had first dose Shingrix.    6. SVT.  He sees Dr. Morgan.  He has a loop recorder in place and is on bisoprolol and flecainide.  Doing well.    7. History of colon polyps.  Colonoscopy  3/2019 showed tubular adenomas.  Dr. Dai recommended repeat in 3 years.     9. Hx BCC.  Followed every 6 months by Dr. Donohue.    Outpatient Medications Prior to Visit   Medication Sig Dispense Refill   • bisoprolol (ZEBeta) 5 MG tablet      • Cholecalciferol (VITAMIN D3) 2000 units capsule      • flecainide (TAMBOCOR) 50 MG tablet      • Garlic 1000 MG capsule      • Glucosamine-Chondroit-Vit C-Mn (GLUCOSAMINE 1500 COMPLEX PO)      • L-Lysine 1000 MG tablet      • sildenafil (REVATIO) 20 MG tablet      • tamsulosin (FLOMAX) 0.4 MG capsule 24 hr capsule TAKE 1 CAPSULE DAILY 90 capsule 3   • Testosterone (TESTOPEL IL) by Implant route. Injections Twice a Year.     • simvastatin (ZOCOR) 40 MG tablet Take 1 tablet by mouth Daily. AT BEDTIME 90 tablet 3     No facility-administered medications prior to visit.      New Medications Ordered This Visit   Medications   • simvastatin (ZOCOR) 40 MG tablet     Sig: Take 1 tablet by mouth Daily. AT BEDTIME     Dispense:  90 tablet     Refill:  3     [unfilled]  Medications Discontinued During This Encounter   Medication Reason   • simvastatin (ZOCOR) 40 MG tablet Reorder       Return in about 6 months (around 1/22/2020).

## 2019-10-13 ENCOUNTER — APPOINTMENT (OUTPATIENT)
Dept: GENERAL RADIOLOGY | Facility: HOSPITAL | Age: 69
End: 2019-10-13

## 2019-10-13 ENCOUNTER — HOSPITAL ENCOUNTER (EMERGENCY)
Facility: HOSPITAL | Age: 69
Discharge: HOME | End: 2019-10-13
Attending: EMERGENCY MEDICINE | Admitting: EMERGENCY MEDICINE

## 2019-10-13 VITALS
OXYGEN SATURATION: 95 % | HEIGHT: 69 IN | SYSTOLIC BLOOD PRESSURE: 134 MMHG | TEMPERATURE: 98.1 F | RESPIRATION RATE: 18 BRPM | DIASTOLIC BLOOD PRESSURE: 76 MMHG | WEIGHT: 235 LBS | HEART RATE: 79 BPM | BODY MASS INDEX: 34.8 KG/M2

## 2019-10-13 DIAGNOSIS — S93.401A MILD ANKLE SPRAIN, RIGHT, INITIAL ENCOUNTER: ICD-10-CM

## 2019-10-13 DIAGNOSIS — S51.011A LACERATION OF RIGHT ELBOW, INITIAL ENCOUNTER: Primary | ICD-10-CM

## 2019-10-13 DIAGNOSIS — S81.011A LACERATION OF RIGHT KNEE, INITIAL ENCOUNTER: ICD-10-CM

## 2019-10-13 PROCEDURE — 25010000002 TDAP 5-2.5-18.5 LF-MCG/0.5 SUSPENSION: Performed by: EMERGENCY MEDICINE

## 2019-10-13 PROCEDURE — 12002 RPR S/N/AX/GEN/TRNK2.6-7.5CM: CPT | Performed by: EMERGENCY MEDICINE

## 2019-10-13 PROCEDURE — 73610 X-RAY EXAM OF ANKLE: CPT

## 2019-10-13 PROCEDURE — 99282 EMERGENCY DEPT VISIT SF MDM: CPT | Performed by: EMERGENCY MEDICINE

## 2019-10-13 PROCEDURE — 90715 TDAP VACCINE 7 YRS/> IM: CPT | Performed by: EMERGENCY MEDICINE

## 2019-10-13 PROCEDURE — 99282 EMERGENCY DEPT VISIT SF MDM: CPT

## 2019-10-13 PROCEDURE — 90471 IMMUNIZATION ADMIN: CPT | Performed by: EMERGENCY MEDICINE

## 2019-10-13 RX ORDER — LISINOPRIL 5 MG/1
10 TABLET ORAL DAILY
COMMUNITY
End: 2021-04-12

## 2019-10-13 RX ORDER — BUPIVACAINE HYDROCHLORIDE 5 MG/ML
10 INJECTION, SOLUTION EPIDURAL; INTRACAUDAL ONCE
Status: COMPLETED | OUTPATIENT
Start: 2019-10-13 | End: 2019-10-13

## 2019-10-13 RX ORDER — LIDOCAINE HYDROCHLORIDE AND EPINEPHRINE BITARTRATE 20; .01 MG/ML; MG/ML
10 INJECTION, SOLUTION SUBCUTANEOUS ONCE
Status: COMPLETED | OUTPATIENT
Start: 2019-10-13 | End: 2019-10-13

## 2019-10-13 RX ADMIN — BUPIVACAINE HYDROCHLORIDE 10 ML: 5 INJECTION, SOLUTION EPIDURAL; INTRACAUDAL; PERINEURAL at 19:42

## 2019-10-13 RX ADMIN — TETANUS TOXOID, REDUCED DIPHTHERIA TOXOID AND ACELLULAR PERTUSSIS VACCINE, ADSORBED 0.5 ML: 5; 2.5; 8; 8; 2.5 SUSPENSION INTRAMUSCULAR at 18:31

## 2019-10-13 RX ADMIN — LIDOCAINE HYDROCHLORIDE,EPINEPHRINE BITARTRATE 10 ML: 20; .01 INJECTION, SOLUTION INFILTRATION; PERINEURAL at 19:42
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
## 2019-10-13 NOTE — ED NOTES
Cleansed right elbow and right knee with chlorhexidene sponge and rinsed with sterile normal saline. Pt tolerated well and stated that he does feel numb. Ready for sutures      Thi Celestin, CNA  10/13/19 [id number]

## 2019-10-13 NOTE — DISCHARGE INSTRUCTIONS
Wear Aircast as needed for support and comfort x1 week.  Ice and elevate.  Keep wounds clean and dry for 24 hours.  Then wash 3 times daily with antibacterial soap, pat dry and apply bacitracin or Neosporin.  Continue until healed.  Follow-up with your PCP in 2 days for wound check, in 10 to 12 days for suture removal.  Sooner for signs of infection.  Return to ED for signs of infection, worsening symptoms, medical emergencies.

## 2019-10-14 NOTE — ED NOTES
Dressings and bacitracin applied to elbow, knee, and hip area.      Thi Celestin, SHANDRA  10/13/19 2004
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
## 2019-10-23 ENCOUNTER — OFFICE VISIT (OUTPATIENT)
Dept: INTERNAL MEDICINE | Facility: CLINIC | Age: 69
End: 2019-10-23

## 2019-10-23 VITALS
TEMPERATURE: 98 F | HEIGHT: 68 IN | HEART RATE: 63 BPM | RESPIRATION RATE: 16 BRPM | BODY MASS INDEX: 35.61 KG/M2 | WEIGHT: 235 LBS | OXYGEN SATURATION: 95 %

## 2019-10-23 DIAGNOSIS — Z48.02 ENCOUNTER FOR REMOVAL OF SUTURES: Primary | ICD-10-CM

## 2019-10-23 PROCEDURE — 99024 POSTOP FOLLOW-UP VISIT: CPT | Performed by: INTERNAL MEDICINE
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
## 2020-01-20 ENCOUNTER — LAB (OUTPATIENT)
Dept: INTERNAL MEDICINE | Facility: CLINIC | Age: 70
End: 2020-01-20

## 2020-01-20 DIAGNOSIS — R73.03 PREDIABETES: ICD-10-CM

## 2020-01-20 DIAGNOSIS — Z86.010 HISTORY OF COLON POLYPS: ICD-10-CM

## 2020-01-20 DIAGNOSIS — E78.5 HYPERLIPIDEMIA, UNSPECIFIED HYPERLIPIDEMIA TYPE: ICD-10-CM

## 2020-01-20 LAB
ALBUMIN SERPL-MCNC: 4.4 G/DL (ref 3.5–5.2)
ALBUMIN/GLOB SERPL: 2.4 G/DL
ALP SERPL-CCNC: 64 U/L (ref 39–117)
ALT SERPL-CCNC: 22 U/L (ref 1–41)
AST SERPL-CCNC: 14 U/L (ref 1–40)
BASOPHILS # BLD AUTO: 0.06 10*3/MM3 (ref 0–0.2)
BASOPHILS NFR BLD AUTO: 1 % (ref 0–1.5)
BILIRUB SERPL-MCNC: 0.4 MG/DL (ref 0.2–1.2)
BUN SERPL-MCNC: 15 MG/DL (ref 8–23)
BUN/CREAT SERPL: 13.6 (ref 7–25)
CALCIUM SERPL-MCNC: 9.3 MG/DL (ref 8.6–10.5)
CHLORIDE SERPL-SCNC: 101 MMOL/L (ref 98–107)
CHOLEST SERPL-MCNC: 160 MG/DL (ref 0–200)
CHOLEST/HDLC SERPL: 3.08 {RATIO}
CO2 SERPL-SCNC: 27.4 MMOL/L (ref 22–29)
CREAT SERPL-MCNC: 1.1 MG/DL (ref 0.76–1.27)
EOSINOPHIL # BLD AUTO: 0.28 10*3/MM3 (ref 0–0.4)
EOSINOPHIL NFR BLD AUTO: 4.5 % (ref 0.3–6.2)
ERYTHROCYTE [DISTWIDTH] IN BLOOD BY AUTOMATED COUNT: 12.7 % (ref 12.3–15.4)
GLOBULIN SER CALC-MCNC: 1.8 GM/DL
GLUCOSE SERPL-MCNC: 100 MG/DL (ref 65–99)
HBA1C MFR BLD: 5.5 % (ref 4.8–5.6)
HCT VFR BLD AUTO: 51.2 % (ref 37.5–51)
HDLC SERPL-MCNC: 52 MG/DL (ref 40–60)
HGB BLD-MCNC: 17.2 G/DL (ref 13–17.7)
IMM GRANULOCYTES # BLD AUTO: 0.03 10*3/MM3 (ref 0–0.05)
IMM GRANULOCYTES NFR BLD AUTO: 0.5 % (ref 0–0.5)
LDLC SERPL CALC-MCNC: 89 MG/DL (ref 0–100)
LYMPHOCYTES # BLD AUTO: 0.9 10*3/MM3 (ref 0.7–3.1)
LYMPHOCYTES NFR BLD AUTO: 14.4 % (ref 19.6–45.3)
MCH RBC QN AUTO: 29.6 PG (ref 26.6–33)
MCHC RBC AUTO-ENTMCNC: 33.6 G/DL (ref 31.5–35.7)
MCV RBC AUTO: 88.1 FL (ref 79–97)
MONOCYTES # BLD AUTO: 0.58 10*3/MM3 (ref 0.1–0.9)
MONOCYTES NFR BLD AUTO: 9.3 % (ref 5–12)
NEUTROPHILS # BLD AUTO: 4.4 10*3/MM3 (ref 1.7–7)
NEUTROPHILS NFR BLD AUTO: 70.3 % (ref 42.7–76)
NRBC BLD AUTO-RTO: 0 /100 WBC (ref 0–0.2)
PLATELET # BLD AUTO: 245 10*3/MM3 (ref 140–450)
POTASSIUM SERPL-SCNC: 4.9 MMOL/L (ref 3.5–5.2)
PROT SERPL-MCNC: 6.2 G/DL (ref 6–8.5)
RBC # BLD AUTO: 5.81 10*6/MM3 (ref 4.14–5.8)
SODIUM SERPL-SCNC: 141 MMOL/L (ref 136–145)
TRIGL SERPL-MCNC: 96 MG/DL (ref 0–150)
TSH SERPL DL<=0.005 MIU/L-ACNC: 0.8 UIU/ML (ref 0.27–4.2)
VIT B12 SERPL-MCNC: 713 PG/ML (ref 211–946)
VLDLC SERPL CALC-MCNC: 19.2 MG/DL
WBC # BLD AUTO: 6.25 10*3/MM3 (ref 3.4–10.8)

## 2020-01-27 ENCOUNTER — OFFICE VISIT (OUTPATIENT)
Dept: INTERNAL MEDICINE | Facility: CLINIC | Age: 70
End: 2020-01-27

## 2020-01-27 VITALS
WEIGHT: 245 LBS | HEART RATE: 64 BPM | DIASTOLIC BLOOD PRESSURE: 78 MMHG | BODY MASS INDEX: 37.13 KG/M2 | TEMPERATURE: 97.5 F | HEIGHT: 68 IN | SYSTOLIC BLOOD PRESSURE: 138 MMHG | OXYGEN SATURATION: 98 % | RESPIRATION RATE: 16 BRPM

## 2020-01-27 DIAGNOSIS — E29.1 HYPOGONADISM IN MALE: ICD-10-CM

## 2020-01-27 DIAGNOSIS — Z00.00 ROUTINE HEALTH MAINTENANCE: ICD-10-CM

## 2020-01-27 DIAGNOSIS — R73.03 PREDIABETES: ICD-10-CM

## 2020-01-27 DIAGNOSIS — I47.1 SUPRAVENTRICULAR TACHYCARDIA (HCC): ICD-10-CM

## 2020-01-27 DIAGNOSIS — Z86.010 HISTORY OF COLON POLYPS: ICD-10-CM

## 2020-01-27 DIAGNOSIS — F52.21 ED (ERECTILE DYSFUNCTION) OF NON-ORGANIC ORIGIN: ICD-10-CM

## 2020-01-27 DIAGNOSIS — Z85.828 HISTORY OF BASAL CELL CARCINOMA: ICD-10-CM

## 2020-01-27 DIAGNOSIS — E78.5 HYPERLIPIDEMIA, UNSPECIFIED HYPERLIPIDEMIA TYPE: Primary | ICD-10-CM

## 2020-01-27 DIAGNOSIS — N40.1 BENIGN PROSTATIC HYPERPLASIA WITH LOWER URINARY TRACT SYMPTOMS, SYMPTOM DETAILS UNSPECIFIED: ICD-10-CM

## 2020-01-27 PROCEDURE — 99214 OFFICE O/P EST MOD 30 MIN: CPT | Performed by: FAMILY MEDICINE

## 2020-01-27 NOTE — PROGRESS NOTES
Subjective     Berhane Chaidez is a 69 y.o. male, who presents with a chief complaint of   Chief Complaint   Patient presents with   • Diabetes   • Hypertension   • Hyperlipidemia     Hyperlipidemia     Diabetes     Hypertension       1. Hyperlipidemia.  Pt still tolerates simvastatin.  He exercises regularly and tries to watch his diet.    2. Prediabetes.  He was previously on metformin but now controls this with lifestyle measures.     3. SVT.  He still has a loop recorder and is on bisoprolol and flecainide.  He reports that he is feeling well.  Denies palpitations.    The following portions of the patient's history were reviewed and updated as appropriate: allergies, current medications, past family history, past medical history, past social history, past surgical history and problem list.    Allergies: Patient has no known allergies.    Review of Systems   Constitutional: Negative.    HENT: Negative.    Eyes: Negative.    Respiratory: Negative.    Cardiovascular: Negative.    Gastrointestinal: Negative.    Endocrine: Negative.    Genitourinary: Negative.    Musculoskeletal: Negative.    Skin: Negative.    Allergic/Immunologic: Negative.    Neurological: Negative.    Hematological: Negative.    Psychiatric/Behavioral: Negative.      Objective     Wt Readings from Last 3 Encounters:   01/27/20 111 kg (245 lb)   10/23/19 107 kg (235 lb)   10/13/19 107 kg (235 lb)     Temp Readings from Last 3 Encounters:   01/27/20 97.5 °F (36.4 °C) (Oral)   10/23/19 98 °F (36.7 °C) (Oral)   10/13/19 98.1 °F (36.7 °C) (Oral)     BP Readings from Last 3 Encounters:   01/27/20 138/78   10/13/19 134/76   07/22/19 130/78     Pulse Readings from Last 3 Encounters:   01/27/20 64   10/23/19 63   10/13/19 79     Body mass index is 37.25 kg/m².  SpO2 Readings from Last 3 Encounters:   01/04/18 98%   06/29/17 94%   05/23/17 97%       Physical Exam   Constitutional: He is oriented to person, place, and time. He appears well-developed and  well-nourished.   HENT:   Head: Normocephalic and atraumatic.   Eyes: Conjunctivae and EOM are normal.   Neck: Neck supple. No thyromegaly present.   Cardiovascular: Normal rate, regular rhythm and normal heart sounds.   No murmur heard.  Pulmonary/Chest: Effort normal and breath sounds normal.   Abdominal: Soft. There is no tenderness.   Musculoskeletal: Normal range of motion. He exhibits no edema.   Neurological: He is alert and oriented to person, place, and time.   Skin: Skin is warm and dry.   Psychiatric: He has a normal mood and affect. His behavior is normal.   Nursing note and vitals reviewed.    Assessment/Plan   Berhane was seen today for diabetes, hypertension and hyperlipidemia.    Diagnoses and all orders for this visit:    Hyperlipidemia, unspecified hyperlipidemia type  -     Comprehensive Metabolic Panel; Future  -     Lipid Panel With / Chol / HDL Ratio; Future  -     TSH; Future    Prediabetes  -     Hemoglobin A1c; Future  -     Vitamin B12; Future    Benign prostatic hyperplasia with lower urinary tract symptoms, symptom details unspecified    History of basal cell carcinoma    Hypogonadism in male    Supraventricular tachycardia (CMS/HCC)    History of colon polyps  -     CBC & Differential; Future    Routine health maintenance    ED (erectile dysfunction) of non-organic origin    1. Hyperlipidemia.  Well-controlled.  Continue simvastatin and lifestyle measures.    2. Prediabetes.  A1c 5.5, down from 5.7.  Stay off metformin and continue lifestyle measures.     3. BPH.  Symptoms controlled.  Per Dr. Dumont.    4. Hypogonadism.  Has Testopel implants per Dr. Dumont.    5. Hx BCC.  Followed every 6 months by Dr. Donohue.    6. SVT.  He sees Dr. Morgan.  He has a loop recorder in place and is on bisoprolol and flecainide.  Doing well.    7. History of colon polyps.  Colonoscopy 3/2019 showed tubular adenomas.  Dr. Dai recommended repeat in 3 years.     9. Routine health maint.  Pneumococcal  vaccines UTD.  Had Shingrix at pharmacy.    10.  E. D. Controlled with sildenafil per Dr. Donohue.    Outpatient Medications Prior to Visit   Medication Sig Dispense Refill   • bisoprolol (ZEBeta) 5 MG tablet Take 5 mg by mouth Daily.     • Cholecalciferol (VITAMIN D3) 2000 units capsule Take 2,000 Units by mouth Daily.     • flecainide (TAMBOCOR) 50 MG tablet Take 50 mg by mouth 2 (Two) Times a Day.     • Garlic 1000 MG capsule Take 1,000 mg by mouth Daily.     • Glucosamine-Chondroit-Vit C-Mn (GLUCOSAMINE 1500 COMPLEX PO) Take 1 tablet by mouth Daily.     • L-Lysine 1000 MG tablet Take 1,000 mg by mouth Daily.     • lisinopril (PRINIVIL,ZESTRIL) 5 MG tablet Take 5 mg by mouth Daily.     • sildenafil (REVATIO) 20 MG tablet Take 20 mg by mouth As Needed.     • simvastatin (ZOCOR) 40 MG tablet Take 1 tablet by mouth Daily. AT BEDTIME 90 tablet 3   • tamsulosin (FLOMAX) 0.4 MG capsule 24 hr capsule TAKE 1 CAPSULE DAILY (Patient taking differently: TAKE 1 CAPSULE in evening) 90 capsule 3   • Testosterone (TESTOPEL IL) by Implant route. Injections Twice a Year.       No facility-administered medications prior to visit.      No orders of the defined types were placed in this encounter.    [unfilled]  There are no discontinued medications.    Return in about 6 months (around 7/27/2020).

## 2020-03-04 ENCOUNTER — OFFICE VISIT (OUTPATIENT)
Dept: SURGERY | Facility: CLINIC | Age: 70
End: 2020-03-04

## 2020-03-04 VITALS
HEIGHT: 68 IN | OXYGEN SATURATION: 94 % | SYSTOLIC BLOOD PRESSURE: 140 MMHG | HEART RATE: 61 BPM | DIASTOLIC BLOOD PRESSURE: 86 MMHG | WEIGHT: 248.6 LBS | BODY MASS INDEX: 37.68 KG/M2

## 2020-03-04 DIAGNOSIS — L08.9 INFECTED SEBACEOUS CYST OF SKIN: Primary | ICD-10-CM

## 2020-03-04 DIAGNOSIS — L72.3 INFECTED SEBACEOUS CYST OF SKIN: Primary | ICD-10-CM

## 2020-03-04 PROCEDURE — 99203 OFFICE O/P NEW LOW 30 MIN: CPT | Performed by: SURGERY

## 2020-03-04 RX ORDER — SODIUM CHLORIDE 9 MG/ML
100 INJECTION, SOLUTION INTRAVENOUS CONTINUOUS
Status: CANCELLED | OUTPATIENT
Start: 2020-03-04

## 2020-03-04 NOTE — PROGRESS NOTES
Chief Complaint   Patient presents with   • Cyst     a couple of cyst on back         Patient is a 69 y.o. male referred by Aung Britt MD for evaluation of 2 cyst on his upper back.  Patient noticed them about a year ago.  Patient's wife had been squeezing them and finally they just keep coming back and getting infected. Patient is desiring to have them removed.  Patient denies fever, chills, nausea or vomiting.    Past Medical History:   Diagnosis Date   • Arthritis     OSTEO   • Diabetes mellitus (CMS/HCC)     PRE.   • Diverticular disease    • ED (erectile dysfunction)    • History of kidney stones    • Hyperlipidemia    • Hypertension    • Kidney stone    • Low testosterone    • MRSA infection     HISTORY OF ON HAND. DR NEVILLE TREATED WITH TOPICAL CREAM SEVERAL YEARS AGO   • Right knee pain      Past Surgical History:   Procedure Laterality Date   • CARPAL TUNNEL RELEASE Right    • CATARACT EXTRACTION     • CATARACT EXTRACTION      LEFT AND RIGHT   • COLONOSCOPY     • COLONOSCOPY N/A 3/6/2019    Procedure: COLONOSCOPY TO CECUM AND TI WITH HOT AND COLD POLYPECTOMIES;  Surgeon: Ana Dai MD;  Location: [facility] ENDOSCOPY;  Service: Gastroenterology   • INCARCERATED HERNIA REPAIR      History of Incarcerated Umbilical Hernia Repair for Person Over Age 5    • ME TOTAL KNEE ARTHROPLASTY Right 2017    Procedure: RT TOTAL KNEE ARTHROPLASTY;  Surgeon: Rober Andrews MD;  Location: [facility] MAIN OR;  Service: Orthopedics   • TONSILLECTOMY     • TOTAL HIP ARTHROPLASTY      LEFT  RIGHT 2013   • TOTAL SHOULDER ARTHROPLASTY      RIGHT  AND LEFT    • WISDOM TOOTH EXTRACTION      X1     Family History   Problem Relation Age of Onset   • Heart disease Father    • Hypertension Father    • Multiple sclerosis Son      Social History     Tobacco Use   • Smoking status: Former Smoker     Last attempt to quit: 1973     Years since quittin.5   • Smokeless tobacco: Never Used   Substance Use  Topics   • Alcohol use: Yes     Alcohol/week: 14.0 standard drinks     Types: 7 Glasses of wine, 7 Cans of beer per week     Comment: Patient states he drinks a little every day   • Drug use: No     No Known Allergies    Current Outpatient Medications:   •  bisoprolol (ZEBeta) 5 MG tablet, Take 5 mg by mouth Daily., Disp: , Rfl:   •  Cholecalciferol (VITAMIN D3) 2000 units capsule, Take 2,000 Units by mouth Daily., Disp: , Rfl:   •  flecainide (TAMBOCOR) 50 MG tablet, Take 50 mg by mouth 2 (Two) Times a Day., Disp: , Rfl:   •  Garlic 1000 MG capsule, Take 1,000 mg by mouth Daily., Disp: , Rfl:   •  Glucosamine-Chondroit-Vit C-Mn (GLUCOSAMINE 1500 COMPLEX PO), Take 1 tablet by mouth Daily., Disp: , Rfl:   •  L-Lysine 1000 MG tablet, Take 1,000 mg by mouth Daily., Disp: , Rfl:   •  lisinopril (PRINIVIL,ZESTRIL) 5 MG tablet, Take 5 mg by mouth Daily., Disp: , Rfl:   •  sildenafil (REVATIO) 20 MG tablet, Take 20 mg by mouth As Needed., Disp: , Rfl:   •  simvastatin (ZOCOR) 40 MG tablet, Take 1 tablet by mouth Daily. AT BEDTIME, Disp: 90 tablet, Rfl: 3  •  tamsulosin (FLOMAX) 0.4 MG capsule 24 hr capsule, TAKE 1 CAPSULE DAILY (Patient taking differently: TAKE 1 CAPSULE in evening), Disp: 90 capsule, Rfl: 3  •  Testosterone (TESTOPEL IL), by Implant route. Injections Twice a Year., Disp: , Rfl:     Review of Systems  General: Patient reports during good health  Eyes: No eye problems  Ears, nose, mouth and throat: No rhinitis, no hearing problems, no chronic cough  Cardiovascular/heart: Denies palpitations, syncope or chest pain  Respiratory/lung: Denies shortness of breath, hemoptysis, dyspnea on exertion   Genital/urinary: No frequency, hematuria or dysuria  Hematological/lymphatic: Denies anemia or other problems  Musculoskeletal: No joint pain, no defects  Skin: No psoriasis or other skin issues  Neurological: No seizures or other neurological problems  Psychiatric: None  Endocrine: Negative  Gastro-intestinal: No  "constipation, no diarrhea, no melena, no hematochezia     Vitals:    03/04/20 1018   BP: 140/86   BP Location: Left arm   Patient Position: Sitting   Cuff Size: Large Adult   Pulse: 61   SpO2: 94%   Weight: 113 kg (248 lb 9.6 oz)   Height: 172.7 cm (67.99\")       Physical Exam  General/physcological:   Alert and oriented x3, in no acute distress  HEENT: Normal cephalic, atraumatic, PERRLA, EOMI, sclera anicteric, moist mucous membranes, neck is supple, no JVD, no carotid bruits, no thyromegaly no adenopathy  Respiratory: CTA and percussion  CVA: RRR, normal S1-S2, no murmurs, no gallops or rubs  Back: Patient has 2 raised areas on the left upper back and one in the mid back with a punctum  GI: Positive BS, soft, nondistended, nontender, no rebound, no guarding, no hernias, no organomegaly and no masses  Musculoskeletal: Moves all 4 ext, no clubbing, no cyanosis or edema  Neurovascular: Grossly intact  Debilities: none  Emotional behavior: appropriate     Patient does not use tobacco products currently.     Assessment:  2 sebaceous cyst on the back that is symptomatic  Plan:  I have recommended the patient undergo surgical excision of the 2 sebaceous cyst on his back to prevent recurring infections. I have discussed this procedure in detail with the patient. I have discussed the risk, benefits and alternatives. I have discussed the risk of infection, bleeding, anesthesia and recurrence. Patient understands these risk, benefits and alternatives and wishes to proceed. I have him scheduled at his convenience.    Gill Maldonado MD  General, Minimally Invasive and Endoscopic Surgery  [facility]      [address] [facility] [address] [facility] [address]    [address]  [place], [place] [id number] [place] [id number]    P: [phone]  F: [phone]    Cc:  Aung Britt MD                  "

## 2020-03-04 NOTE — H&P (VIEW-ONLY)
Chief Complaint   Patient presents with   • Cyst     a couple of cyst on back         Patient is a 69 y.o. male referred by Aung Britt MD for evaluation of 2 cyst on his upper back.  Patient noticed them about a year ago.  Patient's wife had been squeezing them and finally they just keep coming back and getting infected. Patient is desiring to have them removed.  Patient denies fever, chills, nausea or vomiting.    Past Medical History:   Diagnosis Date   • Arthritis     OSTEO   • Diabetes mellitus (CMS/HCC)     PRE.   • Diverticular disease    • ED (erectile dysfunction)    • History of kidney stones    • Hyperlipidemia    • Hypertension    • Kidney stone    • Low testosterone    • MRSA infection     HISTORY OF ON HAND. DR NEVILLE TREATED WITH TOPICAL CREAM SEVERAL YEARS AGO   • Right knee pain      Past Surgical History:   Procedure Laterality Date   • CARPAL TUNNEL RELEASE Right    • CATARACT EXTRACTION     • CATARACT EXTRACTION      LEFT AND RIGHT   • COLONOSCOPY     • COLONOSCOPY N/A 3/6/2019    Procedure: COLONOSCOPY TO CECUM AND TI WITH HOT AND COLD POLYPECTOMIES;  Surgeon: Ana Dai MD;  Location: [facility] ENDOSCOPY;  Service: Gastroenterology   • INCARCERATED HERNIA REPAIR      History of Incarcerated Umbilical Hernia Repair for Person Over Age 5    • MD TOTAL KNEE ARTHROPLASTY Right 2017    Procedure: RT TOTAL KNEE ARTHROPLASTY;  Surgeon: Rober Andrews MD;  Location: [facility] MAIN OR;  Service: Orthopedics   • TONSILLECTOMY     • TOTAL HIP ARTHROPLASTY      LEFT  RIGHT 2013   • TOTAL SHOULDER ARTHROPLASTY      RIGHT  AND LEFT    • WISDOM TOOTH EXTRACTION      X1     Family History   Problem Relation Age of Onset   • Heart disease Father    • Hypertension Father    • Multiple sclerosis Son      Social History     Tobacco Use   • Smoking status: Former Smoker     Last attempt to quit: 1973     Years since quittin.5   • Smokeless tobacco: Never Used   Substance Use  Topics   • Alcohol use: Yes     Alcohol/week: 14.0 standard drinks     Types: 7 Glasses of wine, 7 Cans of beer per week     Comment: Patient states he drinks a little every day   • Drug use: No     No Known Allergies    Current Outpatient Medications:   •  bisoprolol (ZEBeta) 5 MG tablet, Take 5 mg by mouth Daily., Disp: , Rfl:   •  Cholecalciferol (VITAMIN D3) 2000 units capsule, Take 2,000 Units by mouth Daily., Disp: , Rfl:   •  flecainide (TAMBOCOR) 50 MG tablet, Take 50 mg by mouth 2 (Two) Times a Day., Disp: , Rfl:   •  Garlic 1000 MG capsule, Take 1,000 mg by mouth Daily., Disp: , Rfl:   •  Glucosamine-Chondroit-Vit C-Mn (GLUCOSAMINE 1500 COMPLEX PO), Take 1 tablet by mouth Daily., Disp: , Rfl:   •  L-Lysine 1000 MG tablet, Take 1,000 mg by mouth Daily., Disp: , Rfl:   •  lisinopril (PRINIVIL,ZESTRIL) 5 MG tablet, Take 5 mg by mouth Daily., Disp: , Rfl:   •  sildenafil (REVATIO) 20 MG tablet, Take 20 mg by mouth As Needed., Disp: , Rfl:   •  simvastatin (ZOCOR) 40 MG tablet, Take 1 tablet by mouth Daily. AT BEDTIME, Disp: 90 tablet, Rfl: 3  •  tamsulosin (FLOMAX) 0.4 MG capsule 24 hr capsule, TAKE 1 CAPSULE DAILY (Patient taking differently: TAKE 1 CAPSULE in evening), Disp: 90 capsule, Rfl: 3  •  Testosterone (TESTOPEL IL), by Implant route. Injections Twice a Year., Disp: , Rfl:     Review of Systems  General: Patient reports during good health  Eyes: No eye problems  Ears, nose, mouth and throat: No rhinitis, no hearing problems, no chronic cough  Cardiovascular/heart: Denies palpitations, syncope or chest pain  Respiratory/lung: Denies shortness of breath, hemoptysis, dyspnea on exertion   Genital/urinary: No frequency, hematuria or dysuria  Hematological/lymphatic: Denies anemia or other problems  Musculoskeletal: No joint pain, no defects  Skin: No psoriasis or other skin issues  Neurological: No seizures or other neurological problems  Psychiatric: None  Endocrine: Negative  Gastro-intestinal: No  "constipation, no diarrhea, no melena, no hematochezia     Vitals:    03/04/20 1018   BP: 140/86   BP Location: Left arm   Patient Position: Sitting   Cuff Size: Large Adult   Pulse: 61   SpO2: 94%   Weight: 113 kg (248 lb 9.6 oz)   Height: 172.7 cm (67.99\")       Physical Exam  General/physcological:   Alert and oriented x3, in no acute distress  HEENT: Normal cephalic, atraumatic, PERRLA, EOMI, sclera anicteric, moist mucous membranes, neck is supple, no JVD, no carotid bruits, no thyromegaly no adenopathy  Respiratory: CTA and percussion  CVA: RRR, normal S1-S2, no murmurs, no gallops or rubs  Back: Patient has 2 raised areas on the left upper back and one in the mid back with a punctum  GI: Positive BS, soft, nondistended, nontender, no rebound, no guarding, no hernias, no organomegaly and no masses  Musculoskeletal: Moves all 4 ext, no clubbing, no cyanosis or edema  Neurovascular: Grossly intact  Debilities: none  Emotional behavior: appropriate     Patient does not use tobacco products currently.     Assessment:  2 sebaceous cyst on the back that is symptomatic  Plan:  I have recommended the patient undergo surgical excision of the 2 sebaceous cyst on his back to prevent recurring infections. I have discussed this procedure in detail with the patient. I have discussed the risk, benefits and alternatives. I have discussed the risk of infection, bleeding, anesthesia and recurrence. Patient understands these risk, benefits and alternatives and wishes to proceed. I have him scheduled at his convenience.    Gill Maldonado MD  General, Minimally Invasive and Endoscopic Surgery  [facility]      [address] [facility] [address] [facility] [address]    [address]  [place], [place] [address] [place] [address]    P: [phone]  F: [phone]    Cc:  Aung Britt MD                  "

## 2020-03-06 ENCOUNTER — APPOINTMENT (OUTPATIENT)
Dept: PREADMISSION TESTING | Facility: HOSPITAL | Age: 70
End: 2020-03-06

## 2020-03-06 VITALS
HEART RATE: 60 BPM | WEIGHT: 246.1 LBS | BODY MASS INDEX: 37.3 KG/M2 | OXYGEN SATURATION: 96 % | RESPIRATION RATE: 16 BRPM | DIASTOLIC BLOOD PRESSURE: 84 MMHG | SYSTOLIC BLOOD PRESSURE: 152 MMHG | HEIGHT: 68 IN

## 2020-03-06 LAB
ANION GAP SERPL CALCULATED.3IONS-SCNC: 9.8 MMOL/L (ref 5–15)
BUN BLD-MCNC: 18 MG/DL (ref 8–23)
BUN/CREAT SERPL: 18 (ref 7–25)
CALCIUM SPEC-SCNC: 9.4 MG/DL (ref 8.6–10.5)
CHLORIDE SERPL-SCNC: 101 MMOL/L (ref 98–107)
CO2 SERPL-SCNC: 27.2 MMOL/L (ref 22–29)
CREAT BLD-MCNC: 1 MG/DL (ref 0.76–1.27)
DEPRECATED RDW RBC AUTO: 41.5 FL (ref 37–54)
ERYTHROCYTE [DISTWIDTH] IN BLOOD BY AUTOMATED COUNT: 12.6 % (ref 12.3–15.4)
GFR SERPL CREATININE-BSD FRML MDRD: 74 ML/MIN/1.73
GLUCOSE BLD-MCNC: 97 MG/DL (ref 65–99)
HCT VFR BLD AUTO: 53.1 % (ref 37.5–51)
HGB BLD-MCNC: 17.3 G/DL (ref 13–17.7)
MCH RBC QN AUTO: 28.7 PG (ref 26.6–33)
MCHC RBC AUTO-ENTMCNC: 32.6 G/DL (ref 31.5–35.7)
MCV RBC AUTO: 88.2 FL (ref 79–97)
PLATELET # BLD AUTO: 214 10*3/MM3 (ref 140–450)
PMV BLD AUTO: 9.1 FL (ref 6–12)
POTASSIUM BLD-SCNC: 4.7 MMOL/L (ref 3.5–5.2)
RBC # BLD AUTO: 6.02 10*6/MM3 (ref 4.14–5.8)
SODIUM BLD-SCNC: 138 MMOL/L (ref 136–145)
WBC NRBC COR # BLD: 5.8 10*3/MM3 (ref 3.4–10.8)

## 2020-03-06 PROCEDURE — 93010 ELECTROCARDIOGRAM REPORT: CPT | Performed by: INTERNAL MEDICINE

## 2020-03-06 PROCEDURE — 36415 COLL VENOUS BLD VENIPUNCTURE: CPT

## 2020-03-06 PROCEDURE — 80048 BASIC METABOLIC PNL TOTAL CA: CPT | Performed by: SURGERY

## 2020-03-06 PROCEDURE — 85027 COMPLETE CBC AUTOMATED: CPT | Performed by: SURGERY

## 2020-03-06 PROCEDURE — 93005 ELECTROCARDIOGRAM TRACING: CPT

## 2020-03-06 NOTE — DISCHARGE INSTRUCTIONS
PRE-ADMISSION TESTING INSTRUCTIONS FOR ADULTS    Take these medications the morning of surgery with a small sip of water:  Bisoprolol and flecainide      No aspirin, advil, aleve, ibuprofen, naproxen, diet pills, decongestants, or herbal/vitamins for a week prior to surgery.    General Instructions:    • Do not eat solid food after midnight the night before surgery.  No gum, mints, or hard candy after midnight the night before surgery.  • You may drink clear liquids the day of surgery up until 2 hours before your arrival time.  (until 11:00 am)  • Clear liquids are liquids you can see through. Nothing RED in color.    Plain water    Sports drinks  Sodas     Gelatin (Jell-O)  Fruit juices without pulp such as white grape juice and apple juice  Popsicles that contain no fruit or yogurt  Tea or coffee (no cream or milk added)    • It is beneficial for you to have a clear drink that contains carbohydrates just before you leave your house and before your fasting time begins.  We suggest a 20 ounce bottle of Gatorade or Powerade for non-diabetic patients or a 20 ounce bottle of G2 or Powerade Zero for diabetic patients. (before 11:00 am)    • Patients who avoid smoking, chewing tobacco and alcohol for 4 weeks prior to surgery have a reduced risk of post-operative complications.  If at all possible, quit smoking as many days before surgery as you can.    • Do not smoke, use chewing tobacco or drink alcohol the day of surgery    • Bring your C-PAP/ BI-PAP machine if you use one.  • Wear clean comfortable clothes and socks.  • Do not wear contact lenses, lotion, deodorant, or make-up.  Bring a case for your glasses if applicable. You may brush your teeth the morning of surgery.  • You may wear dentures/partials, do not put adhesive/glue on them.  • Bring crutches or walker if applicable.  • Leave all other jewelry and valuables at home.      Preventing a Surgical Site Infection:    • Shower the night before and on the  morning of surgery using the chlorhexidine soap you were given.  Use a clean washcloth with the soap.  Place clean sheets on your bed after showering the night before surgery. Do not use the CHG soap on your hair, face, or private areas. Wash your body gently for five (5) minutes. Do not scrub your skin.  Dry with a clean towel and dress in clean clothing.    • Do not shave the surgical area for 10 days-2 weeks prior to surgery  because the razor can irritate skin and make it easier to develop an infection.  • Make sure you, your family, and all healthcare providers clean their hands with soap and water or an alcohol based hand  before caring for you or your wound.      Day of surgery:    Your surgeon’s office will advise you of your arrival time for the day of surgery.    Upon arrival, a Pre-op nurse and Anesthesia provider will review your health history, obtain vital signs, and answer questions you may have.  The only belongings needed at this time will be your home medications and if applicable your C-PAP/BI-PAP machine.  If you are staying overnight your family can leave the rest of your belongings in the car and bring them to your room later.  A Pre-op nurse will start an IV and you may receive medication in preparation for surgery, including something to help you relax.  Your family will be able to see you in the Pre-op area.  While you are in surgery your family should notify the waiting room  if they leave the waiting room area and provide a contact phone number.    IF you have any questions, you can call the Pre-Admission Department at [phone] or your surgeon's office.  Notify your surgeon if  you become sick, have a fever, productive cough, or cannot be here the day of surgery    Please be aware that surgery does come with discomfort.  We want to make every effort to control your discomfort so please discuss any uncontrolled symptoms with your nurse.   Your doctor will most  likely have prescribed pain medications.      If you are going home after surgery, you will receive individualized written care instructions before being discharged.  A responsible adult (over the age of 18) must drive you to and from the hospital on the day of your surgery and stay with you for 24 hours after anesthesia.    If you are staying overnight following surgery, you will be transported to your hospital room following the recovery period.  [facility] has all private rooms.    You may receive a survey regarding the care you received. Your feedback is very important and will be used to collect the necessary data to help us to continue to provide excellent care.     Deductibles and co-payments are collected on the day of service. Please be prepared to pay the required co-pay, deductible or deposit on the day of service as defined by your plan.

## 2020-03-06 NOTE — PAT
Pt here for PAT visit.  Pre-op tests completed, chg soap given, and instructions reviewed.  Instructed clears until 11:00 am dos, voiced understanding.

## 2020-03-10 ENCOUNTER — ANESTHESIA EVENT (OUTPATIENT)
Dept: PERIOP | Facility: HOSPITAL | Age: 70
End: 2020-03-10

## 2020-03-11 ENCOUNTER — HOSPITAL ENCOUNTER (OUTPATIENT)
Facility: HOSPITAL | Age: 70
Setting detail: HOSPITAL OUTPATIENT SURGERY
Discharge: HOME | End: 2020-03-11
Attending: SURGERY | Admitting: ANESTHESIOLOGY

## 2020-03-11 ENCOUNTER — ANESTHESIA (OUTPATIENT)
Dept: PERIOP | Facility: HOSPITAL | Age: 70
End: 2020-03-11

## 2020-03-11 VITALS
SYSTOLIC BLOOD PRESSURE: 149 MMHG | HEART RATE: 64 BPM | DIASTOLIC BLOOD PRESSURE: 92 MMHG | WEIGHT: 245 LBS | TEMPERATURE: 98 F | RESPIRATION RATE: 20 BRPM | BODY MASS INDEX: 37.25 KG/M2 | OXYGEN SATURATION: 96 %

## 2020-03-11 DIAGNOSIS — L72.3 INFECTED SEBACEOUS CYST OF SKIN: ICD-10-CM

## 2020-03-11 DIAGNOSIS — L08.9 INFECTED SEBACEOUS CYST OF SKIN: ICD-10-CM

## 2020-03-11 LAB — GLUCOSE BLDC GLUCOMTR-MCNC: 106 MG/DL (ref 70–130)

## 2020-03-11 PROCEDURE — 82962 GLUCOSE BLOOD TEST: CPT

## 2020-03-11 PROCEDURE — 11402 EXC TR-EXT B9+MARG 1.1-2 CM: CPT | Performed by: SURGERY

## 2020-03-11 PROCEDURE — 25010000002 ONDANSETRON PER 1 MG: Performed by: NURSE ANESTHETIST, CERTIFIED REGISTERED

## 2020-03-11 PROCEDURE — 25010000002 FENTANYL CITRATE (PF) 100 MCG/2ML SOLUTION: Performed by: NURSE ANESTHETIST, CERTIFIED REGISTERED

## 2020-03-11 PROCEDURE — 25010000002 MIDAZOLAM PER 1MG: Performed by: NURSE ANESTHETIST, CERTIFIED REGISTERED

## 2020-03-11 PROCEDURE — 25010000002 PROPOFOL 10 MG/ML EMULSION: Performed by: NURSE ANESTHETIST, CERTIFIED REGISTERED

## 2020-03-11 PROCEDURE — 88304 TISSUE EXAM BY PATHOLOGIST: CPT | Performed by: SURGERY

## 2020-03-11 PROCEDURE — 25010000002 DEXAMETHASONE PER 1 MG: Performed by: NURSE ANESTHETIST, CERTIFIED REGISTERED

## 2020-03-11 PROCEDURE — 12032 INTMD RPR S/A/T/EXT 2.6-7.5: CPT | Performed by: SURGERY

## 2020-03-11 PROCEDURE — 25010000003 CEFAZOLIN SODIUM-DEXTROSE 2-3 GM-%(50ML) RECONSTITUTED SOLUTION: Performed by: SURGERY

## 2020-03-11 RX ORDER — DEXMEDETOMIDINE HYDROCHLORIDE 100 UG/ML
INJECTION, SOLUTION INTRAVENOUS AS NEEDED
Status: DISCONTINUED | OUTPATIENT
Start: 2020-03-11 | End: 2020-03-11 | Stop reason: SURG

## 2020-03-11 RX ORDER — SODIUM CHLORIDE, SODIUM LACTATE, POTASSIUM CHLORIDE, CALCIUM CHLORIDE 600; 310; 30; 20 MG/100ML; MG/100ML; MG/100ML; MG/100ML
9 INJECTION, SOLUTION INTRAVENOUS CONTINUOUS
Status: DISCONTINUED | OUTPATIENT
Start: 2020-03-11 | End: 2020-03-11 | Stop reason: HOSPADM

## 2020-03-11 RX ORDER — MIDAZOLAM HYDROCHLORIDE 1 MG/ML
INJECTION INTRAMUSCULAR; INTRAVENOUS AS NEEDED
Status: DISCONTINUED | OUTPATIENT
Start: 2020-03-11 | End: 2020-03-11 | Stop reason: SURG

## 2020-03-11 RX ORDER — OXYCODONE HYDROCHLORIDE AND ACETAMINOPHEN 5; 325 MG/1; MG/1
1 TABLET ORAL ONCE AS NEEDED
Status: CANCELLED | OUTPATIENT
Start: 2020-03-11

## 2020-03-11 RX ORDER — MAGNESIUM HYDROXIDE 1200 MG/15ML
LIQUID ORAL AS NEEDED
Status: DISCONTINUED | OUTPATIENT
Start: 2020-03-11 | End: 2020-03-11 | Stop reason: HOSPADM

## 2020-03-11 RX ORDER — FAMOTIDINE 10 MG/ML
20 INJECTION, SOLUTION INTRAVENOUS
Status: COMPLETED | OUTPATIENT
Start: 2020-03-11 | End: 2020-03-11

## 2020-03-11 RX ORDER — SODIUM CHLORIDE 0.9 % (FLUSH) 0.9 %
10 SYRINGE (ML) INJECTION EVERY 12 HOURS SCHEDULED
Status: DISCONTINUED | OUTPATIENT
Start: 2020-03-11 | End: 2020-03-11 | Stop reason: HOSPADM

## 2020-03-11 RX ORDER — SODIUM CHLORIDE 9 MG/ML
100 INJECTION, SOLUTION INTRAVENOUS CONTINUOUS
Status: DISCONTINUED | OUTPATIENT
Start: 2020-03-11 | End: 2020-03-11 | Stop reason: HOSPADM

## 2020-03-11 RX ORDER — DEXAMETHASONE SODIUM PHOSPHATE 4 MG/ML
8 INJECTION, SOLUTION INTRA-ARTICULAR; INTRALESIONAL; INTRAMUSCULAR; INTRAVENOUS; SOFT TISSUE ONCE AS NEEDED
Status: COMPLETED | OUTPATIENT
Start: 2020-03-11 | End: 2020-03-11

## 2020-03-11 RX ORDER — SODIUM CHLORIDE 0.9 % (FLUSH) 0.9 %
10 SYRINGE (ML) INJECTION AS NEEDED
Status: DISCONTINUED | OUTPATIENT
Start: 2020-03-11 | End: 2020-03-11 | Stop reason: HOSPADM

## 2020-03-11 RX ORDER — FENTANYL CITRATE 50 UG/ML
INJECTION, SOLUTION INTRAMUSCULAR; INTRAVENOUS AS NEEDED
Status: DISCONTINUED | OUTPATIENT
Start: 2020-03-11 | End: 2020-03-11 | Stop reason: SURG

## 2020-03-11 RX ORDER — SODIUM CHLORIDE, SODIUM LACTATE, POTASSIUM CHLORIDE, CALCIUM CHLORIDE 600; 310; 30; 20 MG/100ML; MG/100ML; MG/100ML; MG/100ML
100 INJECTION, SOLUTION INTRAVENOUS CONTINUOUS
Status: CANCELLED | OUTPATIENT
Start: 2020-03-11

## 2020-03-11 RX ORDER — LIDOCAINE HYDROCHLORIDE 20 MG/ML
INJECTION, SOLUTION INFILTRATION; PERINEURAL AS NEEDED
Status: DISCONTINUED | OUTPATIENT
Start: 2020-03-11 | End: 2020-03-11 | Stop reason: SURG

## 2020-03-11 RX ORDER — CEFAZOLIN SODIUM 2 G/50ML
2 SOLUTION INTRAVENOUS ONCE
Status: COMPLETED | OUTPATIENT
Start: 2020-03-11 | End: 2020-03-11

## 2020-03-11 RX ORDER — ONDANSETRON 2 MG/ML
4 INJECTION INTRAMUSCULAR; INTRAVENOUS ONCE AS NEEDED
Status: COMPLETED | OUTPATIENT
Start: 2020-03-11 | End: 2020-03-11

## 2020-03-11 RX ORDER — ONDANSETRON 2 MG/ML
4 INJECTION INTRAMUSCULAR; INTRAVENOUS ONCE AS NEEDED
Status: CANCELLED | OUTPATIENT
Start: 2020-03-11

## 2020-03-11 RX ORDER — OXYCODONE HYDROCHLORIDE AND ACETAMINOPHEN 5; 325 MG/1; MG/1
1 TABLET ORAL EVERY 4 HOURS PRN
Qty: 15 TABLET | Refills: 0 | Status: SHIPPED | OUTPATIENT
Start: 2020-03-11 | End: 2020-03-16

## 2020-03-11 RX ORDER — PROPOFOL 10 MG/ML
VIAL (ML) INTRAVENOUS AS NEEDED
Status: DISCONTINUED | OUTPATIENT
Start: 2020-03-11 | End: 2020-03-11 | Stop reason: SURG

## 2020-03-11 RX ORDER — LIDOCAINE HYDROCHLORIDE 10 MG/ML
0.5 INJECTION, SOLUTION EPIDURAL; INFILTRATION; INTRACAUDAL; PERINEURAL ONCE AS NEEDED
Status: DISCONTINUED | OUTPATIENT
Start: 2020-03-11 | End: 2020-03-11 | Stop reason: HOSPADM

## 2020-03-11 RX ORDER — SODIUM CHLORIDE 9 MG/ML
40 INJECTION, SOLUTION INTRAVENOUS AS NEEDED
Status: DISCONTINUED | OUTPATIENT
Start: 2020-03-11 | End: 2020-03-11 | Stop reason: HOSPADM

## 2020-03-11 RX ORDER — KETAMINE HYDROCHLORIDE 10 MG/ML
INJECTION INTRAMUSCULAR; INTRAVENOUS AS NEEDED
Status: DISCONTINUED | OUTPATIENT
Start: 2020-03-11 | End: 2020-03-11 | Stop reason: SURG

## 2020-03-11 RX ADMIN — PROPOFOL 30 MG: 10 INJECTION, EMULSION INTRAVENOUS at 10:51

## 2020-03-11 RX ADMIN — FAMOTIDINE 20 MG: 10 INJECTION INTRAVENOUS at 09:17

## 2020-03-11 RX ADMIN — PROPOFOL 25 MG: 10 INJECTION, EMULSION INTRAVENOUS at 10:35

## 2020-03-11 RX ADMIN — KETAMINE HYDROCHLORIDE 10 MG: 10 INJECTION, SOLUTION INTRAMUSCULAR; INTRAVENOUS at 10:25

## 2020-03-11 RX ADMIN — DEXMEDETOMIDINE HYDROCHLORIDE 20 MCG: 100 INJECTION, SOLUTION, CONCENTRATE INTRAVENOUS at 10:25

## 2020-03-11 RX ADMIN — DEXAMETHASONE SODIUM PHOSPHATE 8 MG: 4 INJECTION, SOLUTION INTRAMUSCULAR; INTRAVENOUS at 09:15

## 2020-03-11 RX ADMIN — ONDANSETRON 4 MG: 2 INJECTION, SOLUTION INTRAMUSCULAR; INTRAVENOUS at 09:17

## 2020-03-11 RX ADMIN — SODIUM CHLORIDE, POTASSIUM CHLORIDE, SODIUM LACTATE AND CALCIUM CHLORIDE 9 ML/HR: 600; 310; 30; 20 INJECTION, SOLUTION INTRAVENOUS at 09:20

## 2020-03-11 RX ADMIN — PROPOFOL 50 MG: 10 INJECTION, EMULSION INTRAVENOUS at 10:29

## 2020-03-11 RX ADMIN — MIDAZOLAM HYDROCHLORIDE 1 MG: 1 INJECTION INTRAMUSCULAR; INTRAVENOUS at 10:29

## 2020-03-11 RX ADMIN — PROPOFOL 25 MG: 10 INJECTION, EMULSION INTRAVENOUS at 10:32

## 2020-03-11 RX ADMIN — FENTANYL CITRATE 25 MCG: 50 INJECTION, SOLUTION INTRAMUSCULAR; INTRAVENOUS at 10:37

## 2020-03-11 RX ADMIN — LIDOCAINE HYDROCHLORIDE 100 MG: 20 INJECTION, SOLUTION INFILTRATION; PERINEURAL at 10:25

## 2020-03-11 RX ADMIN — PROPOFOL 25 MG: 10 INJECTION, EMULSION INTRAVENOUS at 10:37

## 2020-03-11 RX ADMIN — CEFAZOLIN SODIUM 2 G: 2 SOLUTION INTRAVENOUS at 10:33

## 2020-03-11 RX ADMIN — PROPOFOL 30 MG: 10 INJECTION, EMULSION INTRAVENOUS at 10:53

## 2020-03-11 NOTE — ANESTHESIA PREPROCEDURE EVALUATION
Anesthesia Evaluation     Patient summary reviewed and Nursing notes reviewed   no history of anesthetic complications:  NPO Solid Status: > 8 hours  NPO Liquid Status: > 4 hours           Airway   Mallampati: II  TM distance: >3 FB  Neck ROM: full  No difficulty expected  Dental          Pulmonary - normal exam    breath sounds clear to auscultation  (+) a smoker Former,   Cardiovascular - normal exam  Exercise tolerance: good (4-7 METS)    Rhythm: regular  Rate: normal    (+) dysrhythmias Tachycardia, hyperlipidemia,       Neuro/Psych- negative ROS  (-) psychiatric history  GI/Hepatic/Renal/Endo    (+) obesity,   diabetes mellitus ( prediabetic. A1C  5.5),     ROS Comment: Erectile dysfunction    Musculoskeletal     Abdominal   (+) obese,    Substance History - negative use     OB/GYN negative ob/gyn ROS         Other   arthritis,    history of cancer remission    ROS/Med Hx Other: Black coffee at 0500    ECG 12 Lead   Order: [id number]   Status:  Final result   Visible to patient:  Yes (MyChart) Next appt:  07/22/2020 at 08:20 AM in Internal Medicine ([facility])     Narrative & Impression       HEART RATE= 59  bpm  RR Interval= 1020  ms  AK Interval= 204  ms  P Horizontal Axis= 25  deg  P Front Axis= 5  deg  QRSD Interval= 102  ms  QT Interval= 380  ms  QRS Axis= -43  deg  T Wave Axis= -24  deg  - ABNORMAL ECG -  Sinus rhythm  Left ventricular hypertrophy  Inferior infarct, age indeterminate  Anterior infarct, old  Electronically Signed By: Albert Sher ([facility]) 07-Mar-2020 21:02:49  Date and Time of Study: 2020-03-06 13:19:57                          Anesthesia Plan    ASA 3     general     intravenous induction     Anesthetic plan, all risks, benefits, and alternatives have been provided, discussed and informed consent has been obtained with: patient.  Use of blood products discussed with patient  Consented to blood products.   Plan discussed with CRNA.

## 2020-03-11 NOTE — INTERVAL H&P NOTE
/95   Pulse 66   Temp 98.4 °F (36.9 °C) (Oral)   Resp 18   Wt 111 kg (245 lb)   SpO2 94%   BMI 37.25 kg/m²     H&P reviewed. The patient was examined and there are no changes to the H&P.

## 2020-03-11 NOTE — ANESTHESIA POSTPROCEDURE EVALUATION
Patient: Berhane Chaidez    Procedure Summary     Date:  03/11/20 Room / Location:   [facility] 2 /  [facility]    Anesthesia Start:  1023 Anesthesia Stop:      Procedure:  TRUNK LESION/CYST EXCISION, x 2 back (N/A Back) Diagnosis:       Infected sebaceous cyst of skin      (Infected sebaceous cyst of skin [L72.3, L08.9])    Surgeon:  Gill Maldonado MD Provider:  Gloria Ac CRNA    Anesthesia Type:  general ASA Status:  3          Anesthesia Type: general    Vitals  Vitals Value Taken Time   /73 3/11/2020 11:08 AM   Temp 98 °F (36.7 °C) 3/11/2020 11:08 AM   Pulse 61 3/11/2020 11:08 AM   Resp 16 3/11/2020 11:08 AM   SpO2 94 % 3/11/2020 11:08 AM           Post Anesthesia Care and Evaluation    Patient location during evaluation: bedside  Patient participation: complete - patient participated  Level of consciousness: awake  Pain score: 0  Pain management: adequate  Airway patency: patent  Anesthetic complications: No anesthetic complications  PONV Status: none  Cardiovascular status: acceptable  Respiratory status: acceptable  Hydration status: acceptable

## 2020-03-11 NOTE — OP NOTE
Pre-op Dx: Infected sebaceous cyst of skin x 2 upper and mid back    Post-op Dx:  Same    Procedure:  Excision of Sebaceous Cyst x 2 upper and mid back     Surgeon:  Gill Maldonado MD    Assist: none    Anesthesia:  MAC and Local    Specimen: Sebaceous cyst x 2    EBL:  Minium    Indications: This pleasant 69-year-old gentleman who presented with reoccurring infections and 2 sebaceous cyst on his back.     Procedures:   After the patient was consented and positioned in right lateral position, the mid and upper back area was prepped with Betadine and anesthetized with 1/4% Marcaine with epinephrine and 2% lidocaine. Using a 15 blade scalpel, an elliptical incision was performed around the skin punctum of the sebaceous cyst upper left back and mid back area. The cysts were grasped and excised in their entirety with a 15 blade scalpel and submitted to pathology for permanent.  The mid back cyst measured 1.3 cm x 1.8 cm and the left upper back sebaceous cyst measured 1.2 cm x 1.4 cm.  The wounds were copiously irrigated and meticulous hemostasis was maintained throughout the procedure. The wounds were palpated without any new findings and then the subcutaneous tissue was closed with 3-0 Vicryl and the skin was closed with 3-0 nylon. A sterile dressing was applied and the patient tolerated procedure well.  All needles and sponge counts were correct x2.  Patient was transferred to recovery room in stable condition.  I have instructed the patient to call the office for pathology results in 2 business days.    Gill Maldonado MD  General, Minimally Invasive and Endoscopic Surgery  [facility]    [facility] [address]    [address]  [address] [address]    P: [phone]  F: [phone]    Cc:  Aung Britt MD

## 2020-03-11 NOTE — DISCHARGE INSTRUCTIONS
Monitored Anesthesia Care, Care After  These instructions provide you with information about caring for yourself after your procedure. Your health care provider may also give you more specific instructions. Your treatment has been planned according to current medical practices, but problems sometimes occur. Call your health care provider if you have any problems or questions after your procedure.  What can I expect after the procedure?  After your procedure, you may:  · Feel sleepy for several hours.  · Feel clumsy and have poor balance for several hours.  · Feel forgetful about what happened after the procedure.  · Have poor judgment for several hours.  · Feel nauseous or vomit.  · Have a sore throat if you had a breathing tube during the procedure.  Follow these instructions at home:    *****************NO DRIVING TODAY****************************    For at least 24 hours after the procedure:             · Have a responsible adult stay with you. It is important to have someone help care for you until you are awake and alert.  · Rest as needed.  · Do not:  ? Participate in activities in which you could fall or become injured.  ? Use heavy machinery.  ? Drink alcohol.  ? Take sleeping pills or medicines that cause drowsiness.  ? Make important decisions or sign legal documents.  ? Take care of children on your own.  Eating and drinking  · Follow the diet that is recommended by your health care provider.  · If you vomit, drink water, juice, or soup when you can drink without vomiting.  · Make sure you have little or no nausea before eating solid foods.  General instructions  · Take over-the-counter and prescription medicines only as told by your health care provider.  · If you have sleep apnea, surgery and certain medicines can increase your risk for breathing problems. Follow instructions from your health care provider about wearing your sleep device:  ? Anytime you are sleeping, including during daytime  naps.  ? While taking prescription pain medicines, sleeping medicines, or medicines that make you drowsy.  · If you smoke, do not smoke without supervision.  · Keep all follow-up visits as told by your health care provider. This is important.  Contact a health care provider if:  · You keep feeling nauseous or you keep vomiting.  · You feel light-headed.  · You develop a rash.  · You have a fever.  Get help right away if:  · You have trouble breathing.  Summary  · For several hours after your procedure, you may feel sleepy and have poor judgment.  · Have a responsible adult stay with you for at least 24 hours or until you are awake and alert.  This information is not intended to replace advice given to you by your health care provider. Make sure you discuss any questions you have with your health care provider.  Document Released: 04/09/2017 Document Revised: 08/03/2018 Document Reviewed: 04/09/2017  [employer] Patient Education © 2019 [employer]   DISCHARGE INSTRUCTIONS FOR MINOR SURGERY  DR. JAMES  [phone]      Post-Op Minor Surgery    1. If you had any sedation, expect to rest much of the day of surgery, but do get up several times to reduce the risk of getting a clot in your legs.     2. If you had any sedation, eat and drink lightly at first. For example: jello, ginger ale, chicken noodle soup, crackers and other bland food types on the day of surgery. Do not take pain pills on an empty stomach.     3. Your clear “tegaderm” dressing is water resistant but not water proof. You can take a shower with it in place but do not submerge in water for about one week.     4. The clear dressing can be left on for 2 days if desired and will keep the incision sterile. This also acts nicely as a bandage or safeguard of the area from a friction standpoint. If however the area under the clear dressing begins to itch or get red, you can remove it sooner.  It is not unusual for your dressing to be spotted dark  brown the day after surgery and does not require changing.     5. Do not drive while taking pain medications.     6. No strenuous activity for 1 week.     7. In general, if you have a sedentary job, you can return to work in 1-4 days. If heavy lifting is required, 2 weeks.     8. It is not unusual to be constipated by the narcotics given during and after surgery. If needed, common over the counter laxatives can be used temporarily.     9. Call for your pathology report on the tissue removed one day next week. No follow up visit is needed unless there is a problem. If your incision becomes red, swollen, tender, or warm, please call the office at [phone] between 9AM-5PM.     Gill Maldonado MD  General, Minimally Invasive and Endoscopic Surgery  [facility] [place]    [facility] [address]    [address]  [place] [address]               [place] [address]    P: [phone]  F: [phone]    Cc:  Aung Britt MD

## 2020-03-16 DIAGNOSIS — N40.1 BENIGN NON-NODULAR PROSTATIC HYPERPLASIA WITH LOWER URINARY TRACT SYMPTOMS: ICD-10-CM

## 2020-03-16 RX ORDER — TAMSULOSIN HYDROCHLORIDE 0.4 MG/1
1 CAPSULE ORAL DAILY
Qty: 90 CAPSULE | Refills: 3 | Status: SHIPPED | OUTPATIENT
Start: 2020-03-16 | End: 2021-03-22

## 2020-03-16 NOTE — TELEPHONE ENCOUNTER
----- Message from Berhane Chaidez sent at 3/16/2020  8:30 AM EDT -----  Regarding: Prescription Question  Contact: [phone]  I currently have 13 Tamsulosin Hcl  0.4 mg capsules left in my prescription.  They were originally dispensed by [facility].  My drugs as of 2020 are now being dispensed via [facility].  I need to have this prescription sent in, but due to the limited number of pills I have left, please send it to [facility] at [phone].      Thanks.

## 2020-03-19 ENCOUNTER — TELEPHONE (OUTPATIENT)
Dept: SURGERY | Facility: CLINIC | Age: 70
End: 2020-03-19

## 2020-03-19 ENCOUNTER — TELEPHONE (OUTPATIENT)
Dept: INTERNAL MEDICINE | Facility: CLINIC | Age: 70
End: 2020-03-19

## 2020-03-19 NOTE — TELEPHONE ENCOUNTER
[place] office called and sd patient had been trying to call [place] office but kept getting a private residence.  I was given his info and called.  Patient sd he believed his incision was getting infected.  No fever, no oozing, no odor , not hot to the touch.  Patient sd he had 2 incisions and one was MUCH redder than the other.  He had his procedure on 03/11 and has post op 03/25.  I took all this info and sent a message to Dr. Maldonado who is in surgery.  Told the pt this and sd I would cb as soon as she responded.  Less than an hour later Dr. Britt's office called stating  pt had called their office saying his incision IS infected and that we told him Dr. Maldonado was our of town and he could not be seen (which was not said )

## 2020-03-25 ENCOUNTER — OFFICE VISIT (OUTPATIENT)
Dept: SURGERY | Facility: CLINIC | Age: 70
End: 2020-03-25

## 2020-03-25 VITALS
OXYGEN SATURATION: 98 % | BODY MASS INDEX: 37.28 KG/M2 | SYSTOLIC BLOOD PRESSURE: 132 MMHG | WEIGHT: 246 LBS | DIASTOLIC BLOOD PRESSURE: 74 MMHG | HEART RATE: 86 BPM | HEIGHT: 68 IN | RESPIRATION RATE: 18 BRPM

## 2020-03-25 DIAGNOSIS — Z09 FOLLOW UP: Primary | ICD-10-CM

## 2020-03-25 PROCEDURE — 99024 POSTOP FOLLOW-UP VISIT: CPT | Performed by: SURGERY

## 2020-03-25 NOTE — PROGRESS NOTES
"Chief complaint:  Post-op  Follow up    2 wk PO exc sebaceous cyst x2 upper & mid back - no problems at this time    History of Present Illness    This is Berhane Chaidez 70 y.o. status post excision of sebaceous cyst x2 on back and is doing very well.  Patient denies fever, chills, nausea or vomiting.  Patient's pain is well-controlled.      The following portions of the patient's history were reviewed and updated as appropriate: allergies, current medications, past family history, past medical history, past social history, past surgical history and problem list.    Vitals:    03/25/20 0833   BP: 132/74   Pulse: 86   Resp: 18   SpO2: 98%   Weight: 112 kg (246 lb)   Height: 172.7 cm (68\")       Physical Exam  Gen.: Patient is alert and oriented ×3, no acute distress  HEENT: Normal cephalic, atraumatic, moist mucous membranes, anicteric  Incision is well-healed without evidence of infection.    Assessment/plan:    S/P excision of sebaceous cyst x2 on back  I have removed his stitches and placed Steri-Strips  I have instructed the patient follow-up as needed.    Gill Maldonado MD  General, Minimally Invasive and Endoscopic Surgery  [facility]    [address] [facility] [address] [facility] [address]    [address]  [place], [place] [address] [place] [id number]    P: [phone]  F: [phone]    Cc:  Aung Britt MD  "

## 2020-07-22 ENCOUNTER — LAB (OUTPATIENT)
Dept: INTERNAL MEDICINE | Facility: CLINIC | Age: 70
End: 2020-07-22

## 2020-07-22 DIAGNOSIS — Z86.010 HISTORY OF COLON POLYPS: ICD-10-CM

## 2020-07-22 DIAGNOSIS — E78.5 HYPERLIPIDEMIA, UNSPECIFIED HYPERLIPIDEMIA TYPE: ICD-10-CM

## 2020-07-22 DIAGNOSIS — R73.03 PREDIABETES: ICD-10-CM

## 2020-07-23 LAB
ALBUMIN SERPL-MCNC: 4 G/DL (ref 3.5–5.2)
ALBUMIN/GLOB SERPL: 2 G/DL
ALP SERPL-CCNC: 60 U/L (ref 39–117)
ALT SERPL-CCNC: 25 U/L (ref 1–41)
AST SERPL-CCNC: 11 U/L (ref 1–40)
BASOPHILS # BLD AUTO: 0.06 10*3/MM3 (ref 0–0.2)
BASOPHILS NFR BLD AUTO: 0.9 % (ref 0–1.5)
BILIRUB SERPL-MCNC: 0.6 MG/DL (ref 0–1.2)
BUN SERPL-MCNC: 18 MG/DL (ref 8–23)
BUN/CREAT SERPL: 15.4 (ref 7–25)
CALCIUM SERPL-MCNC: 9.3 MG/DL (ref 8.6–10.5)
CHLORIDE SERPL-SCNC: 100 MMOL/L (ref 98–107)
CHOLEST SERPL-MCNC: 148 MG/DL (ref 0–200)
CHOLEST/HDLC SERPL: 2.79 {RATIO}
CO2 SERPL-SCNC: 29.3 MMOL/L (ref 22–29)
CREAT SERPL-MCNC: 1.17 MG/DL (ref 0.76–1.27)
EOSINOPHIL # BLD AUTO: 0.13 10*3/MM3 (ref 0–0.4)
EOSINOPHIL NFR BLD AUTO: 2 % (ref 0.3–6.2)
ERYTHROCYTE [DISTWIDTH] IN BLOOD BY AUTOMATED COUNT: 13 % (ref 12.3–15.4)
GLOBULIN SER CALC-MCNC: 2 GM/DL
GLUCOSE SERPL-MCNC: 94 MG/DL (ref 65–99)
HBA1C MFR BLD: 5.6 % (ref 4.8–5.6)
HCT VFR BLD AUTO: 52 % (ref 37.5–51)
HDLC SERPL-MCNC: 53 MG/DL (ref 40–60)
HGB BLD-MCNC: 17.6 G/DL (ref 13–17.7)
IMM GRANULOCYTES # BLD AUTO: 0.06 10*3/MM3 (ref 0–0.05)
IMM GRANULOCYTES NFR BLD AUTO: 0.9 % (ref 0–0.5)
LDLC SERPL CALC-MCNC: 66 MG/DL (ref 0–100)
LYMPHOCYTES # BLD AUTO: 0.75 10*3/MM3 (ref 0.7–3.1)
LYMPHOCYTES NFR BLD AUTO: 11.3 % (ref 19.6–45.3)
MCH RBC QN AUTO: 30.4 PG (ref 26.6–33)
MCHC RBC AUTO-ENTMCNC: 33.8 G/DL (ref 31.5–35.7)
MCV RBC AUTO: 90 FL (ref 79–97)
MONOCYTES # BLD AUTO: 0.48 10*3/MM3 (ref 0.1–0.9)
MONOCYTES NFR BLD AUTO: 7.2 % (ref 5–12)
NEUTROPHILS # BLD AUTO: 5.17 10*3/MM3 (ref 1.7–7)
NEUTROPHILS NFR BLD AUTO: 77.7 % (ref 42.7–76)
NRBC BLD AUTO-RTO: 0 /100 WBC (ref 0–0.2)
PLATELET # BLD AUTO: 219 10*3/MM3 (ref 140–450)
POTASSIUM SERPL-SCNC: 5.1 MMOL/L (ref 3.5–5.2)
PROT SERPL-MCNC: 6 G/DL (ref 6–8.5)
RBC # BLD AUTO: 5.78 10*6/MM3 (ref 4.14–5.8)
SODIUM SERPL-SCNC: 138 MMOL/L (ref 136–145)
TRIGL SERPL-MCNC: 146 MG/DL (ref 0–150)
TSH SERPL DL<=0.005 MIU/L-ACNC: 0.85 UIU/ML (ref 0.27–4.2)
VIT B12 SERPL-MCNC: 845 PG/ML (ref 211–946)
VLDLC SERPL CALC-MCNC: 29.2 MG/DL
WBC # BLD AUTO: 6.65 10*3/MM3 (ref 3.4–10.8)

## 2020-08-03 ENCOUNTER — OFFICE VISIT (OUTPATIENT)
Dept: INTERNAL MEDICINE | Facility: CLINIC | Age: 70
End: 2020-08-03

## 2020-08-03 VITALS
TEMPERATURE: 97.5 F | WEIGHT: 240 LBS | SYSTOLIC BLOOD PRESSURE: 138 MMHG | RESPIRATION RATE: 16 BRPM | DIASTOLIC BLOOD PRESSURE: 78 MMHG | HEIGHT: 68 IN | OXYGEN SATURATION: 97 % | HEART RATE: 59 BPM | BODY MASS INDEX: 36.37 KG/M2

## 2020-08-03 DIAGNOSIS — E29.1 HYPOGONADISM IN MALE: ICD-10-CM

## 2020-08-03 DIAGNOSIS — E78.5 HYPERLIPIDEMIA, UNSPECIFIED HYPERLIPIDEMIA TYPE: ICD-10-CM

## 2020-08-03 DIAGNOSIS — N40.1 BENIGN PROSTATIC HYPERPLASIA WITH LOWER URINARY TRACT SYMPTOMS, SYMPTOM DETAILS UNSPECIFIED: ICD-10-CM

## 2020-08-03 DIAGNOSIS — Z85.828 HISTORY OF BASAL CELL CARCINOMA: ICD-10-CM

## 2020-08-03 DIAGNOSIS — Z86.010 HISTORY OF COLON POLYPS: ICD-10-CM

## 2020-08-03 DIAGNOSIS — Z00.00 MEDICARE ANNUAL WELLNESS VISIT, SUBSEQUENT: ICD-10-CM

## 2020-08-03 DIAGNOSIS — I47.1 SUPRAVENTRICULAR TACHYCARDIA (HCC): ICD-10-CM

## 2020-08-03 DIAGNOSIS — R73.03 PREDIABETES: Primary | ICD-10-CM

## 2020-08-03 DIAGNOSIS — F52.21 ED (ERECTILE DYSFUNCTION) OF NON-ORGANIC ORIGIN: ICD-10-CM

## 2020-08-03 PROCEDURE — G0439 PPPS, SUBSEQ VISIT: HCPCS | Performed by: FAMILY MEDICINE

## 2020-08-03 PROCEDURE — 99214 OFFICE O/P EST MOD 30 MIN: CPT | Performed by: FAMILY MEDICINE

## 2020-08-03 RX ORDER — ASPIRIN 81 MG/1
81 TABLET ORAL DAILY
COMMUNITY
End: 2021-04-27 | Stop reason: HOSPADM

## 2020-08-03 RX ORDER — SIMVASTATIN 40 MG
40 TABLET ORAL DAILY
Qty: 90 TABLET | Refills: 3 | Status: SHIPPED | OUTPATIENT
Start: 2020-08-03 | End: 2021-08-05 | Stop reason: SDUPTHER

## 2020-08-03 NOTE — PROGRESS NOTES
The ABCs of the Annual Wellness Visit  Subsequent Medicare Wellness Visit    Chief Complaint   Patient presents with   • Medicare Wellness-subsequent   • Hypertension     ekg 3/7/20   • Hyperlipidemia   • Hypogonadism   • Prediabetes       Subjective   History of Present Illness:  Berhane Chaidez is a 70 y.o. male who presents for a Subsequent Medicare Wellness Visit.    HEALTH RISK ASSESSMENT    Recent Hospitalizations:  No hospitalization(s) within the last year.    Current Medical Providers:  Patient Care Team:  Aung Britt MD as PCP - General (Family Medicine)  Sosa Donohue MD as Consulting Physician (Dermatology)  Nael Morgan MD as Consulting Physician (Cardiac Electrophysiology)  Cruz Dumont Jr., MD as Consulting Physician (Urology)    Smoking Status:  Social History     Tobacco Use   Smoking Status Former Smoker   • Packs/day: 1.00   • Years: 5.00   • Pack years: 5.00   • Types: Cigarettes   • Last attempt to quit: 1973   • Years since quittin.9   Smokeless Tobacco Never Used       Alcohol Consumption:  Social History     Substance and Sexual Activity   Alcohol Use Yes   • Alcohol/week: 14.0 - 17.0 standard drinks   • Types: 7 Cans of beer, 7 - 10 Shots of liquor per week    Comment: Patient states he drinks a little every day       Depression Screen:   PHQ-2/PHQ-9 Depression Screening 8/3/2020   Little interest or pleasure in doing things 0   Feeling down, depressed, or hopeless 0   Trouble falling or staying asleep, or sleeping too much -   Feeling tired or having little energy -   Poor appetite or overeating -   Feeling bad about yourself - or that you are a failure or have let yourself or your family down -   Trouble concentrating on things, such as reading the newspaper or watching television -   Moving or speaking so slowly that other people could have noticed. Or the opposite - being so fidgety or restless that you have been moving around a lot more than usual -    Thoughts that you would be better off dead, or of hurting yourself in some way -   Total Score 0   If you checked off any problems, how difficult have these problems made it for you to do your work, take care of things at home, or get along with other people? -       Fall Risk Screen:  BLAINE Fall Risk Assessment was completed, and patient is at LOW risk for falls.Assessment completed on:8/3/2020    Health Habits and Functional and Cognitive Screening:  Functional & Cognitive Status 8/3/2020   Do you have difficulty preparing food and eating? No   Do you have difficulty bathing yourself, getting dressed or grooming yourself? No   Do you have difficulty using the toilet? No   Do you have difficulty moving around from place to place? No   Do you have trouble with steps or getting out of a bed or a chair? No   Current Diet Well Balanced Diet   Dental Exam Up to date   Eye Exam Up to date   Exercise (times per week) 4 times per week   Current Exercise Activities Include Bicycling Outdoors   Do you need help using the phone?  No   Are you deaf or do you have serious difficulty hearing?  No   Do you need help with transportation? No   Do you need help shopping? No   Do you need help preparing meals?  No   Do you need help with housework?  No   Do you need help with laundry? No   Do you need help taking your medications? No   Do you need help managing money? No   Do you ever drive or ride in a car without wearing a seat belt? No   Have you felt unusual stress, anger or loneliness in the last month? No   Who do you live with? Spouse   If you need help, do you have trouble finding someone available to you? No   Have you been bothered in the last four weeks by sexual problems? No   Do you have difficulty concentrating, remembering or making decisions? No         Does the patient have evidence of cognitive impairment? No    Asprin use counseling:Taking ASA appropriately as indicated    Age-appropriate Screening  Schedule:  Refer to the list below for future screening recommendations based on patient's age, sex and/or medical conditions. Orders for these recommended tests are listed in the plan section. The patient has been provided with a written plan.    Health Maintenance   Topic Date Due   • INFLUENZA VACCINE  10/01/2020 (Originally 8/1/2020)   • ZOSTER VACCINE (2 of 3) 02/04/2021 (Originally 8/14/2013)   • DIABETIC FOOT EXAM  08/02/2021 (Originally 2/17/2016)   • URINE MICROALBUMIN  08/02/2021 (Originally 2/17/2016)   • DIABETIC EYE EXAM  04/01/2021   • LIPID PANEL  07/22/2021   • HEMOGLOBIN A1C  07/22/2021   • COLONOSCOPY  03/06/2022   • TDAP/TD VACCINES (2 - Td) 10/13/2029          The following portions of the patient's history were reviewed and updated as appropriate: allergies, current medications, past family history, past medical history, past social history, past surgical history and problem list.    Outpatient Medications Prior to Visit   Medication Sig Dispense Refill   • bisoprolol (ZEBeta) 5 MG tablet Take 5 mg by mouth Daily.     • Cholecalciferol (VITAMIN D3) 2000 units capsule Take 2,000 Units by mouth Daily.     • flecainide (TAMBOCOR) 50 MG tablet Take 50 mg by mouth 2 (Two) Times a Day.     • Garlic 1000 MG capsule Take 1,000 mg by mouth Daily.     • Glucosamine-Chondroit-Vit C-Mn (GLUCOSAMINE 1500 COMPLEX PO) Take 1 tablet by mouth Daily.     • L-Lysine 1000 MG tablet Take 1,000 mg by mouth Daily.     • lisinopril (PRINIVIL,ZESTRIL) 5 MG tablet Take 5 mg by mouth Daily.     • sildenafil (REVATIO) 20 MG tablet Take 20 mg by mouth As Needed.     • simvastatin (ZOCOR) 40 MG tablet Take 1 tablet by mouth Daily. AT BEDTIME (Patient taking differently: Take 40 mg by mouth Every Night. AT BEDTIME) 90 tablet 3   • tamsulosin (FLOMAX) 0.4 MG capsule 24 hr capsule Take 1 capsule by mouth Daily. 90 capsule 3   • Testosterone (TESTOPEL IL) by Implant route. Injections Twice a Year.       No facility-administered  "medications prior to visit.        Patient Active Problem List   Diagnosis   • History of colon polyps   • Prediabetes   • ED (erectile dysfunction) of non-organic origin   • Hyperlipidemia   • Hypogonadism in male   • BPH (benign prostatic hyperplasia)   • Osteoarthritis of both hips   • Routine health maintenance   • History of diverticulitis   • Osteoarthritis, knee   • Supraventricular tachycardia (CMS/HCC)   • History of basal cell carcinoma   • Onychomycosis   • Adenomatous polyp   • Infected sebaceous cyst of skin       Advanced Care Planning:  ACP discussion was held with the patient during this visit. Patient has an advance directive in EMR which is still valid.     Review of Systems    Compared to one year ago, the patient feels his physical health is the same.  Compared to one year ago, the patient feels his mental health is better.    Reviewed chart for potential of high risk medication in the elderly: yes  Reviewed chart for potential of harmful drug interactions in the elderly:yes    Objective         Vitals:    08/03/20 0940   BP: 138/78   BP Location: Left arm   Patient Position: Sitting   Cuff Size: Adult   Pulse: 59   Resp: 16   Temp: 97.5 °F (36.4 °C)   TempSrc: Temporal   SpO2: 97%   Weight: 109 kg (240 lb)   Height: 172.7 cm (68\")       Body mass index is 36.49 kg/m².  Discussed the patient's BMI with him. The BMI is above average; BMI management plan is completed.    Physical Exam    Lab Results   Component Value Date    GLU 94 07/22/2020    CHLPL 148 07/22/2020    TRIG 146 07/22/2020    HDL 53 07/22/2020    LDL 66 07/22/2020    VLDL 29.2 07/22/2020    HGBA1C 5.60 07/22/2020        Assessment/Plan   Medicare Risks and Personalized Health Plan  CMS Preventative Services Quick Reference  Abdominal Aortic Aneurysm Screening  Colon Cancer Screening  Immunizations Discussed/Encouraged (specific immunizations; UTD )    The above risks/problems have been discussed with the patient.  Pertinent " information has been shared with the patient in the After Visit Summary.  Follow up plans and orders are seen below in the Assessment/Plan Section.    There are no diagnoses linked to this encounter.  Follow Up:  No follow-ups on file.     An After Visit Summary and PPPS were given to the patient.

## 2020-08-03 NOTE — PROGRESS NOTES
Subjective     Berhane Chaidez is a 70 y.o. male, who presents with a chief complaint of   Chief Complaint   Patient presents with   • Medicare Wellness-subsequent   • Hypertension     ekg 3/7/20   • Hyperlipidemia   • Hypogonadism   • Prediabetes     Diabetes     Hypertension     Hyperlipidemia       1. Hyperlipidemia.  Pt still tolerates simvastatin.  He exercises regularly and tries to watch his diet.    2. Prediabetes.  He was previously on metformin but now controls this with lifestyle measures.     3. SVT.  He still has a loop recorder and is on bisoprolol and flecainide.  He reports that he is feeling well.  Denies palpitations.    The following portions of the patient's history were reviewed and updated as appropriate: allergies, current medications, past family history, past medical history, past social history, past surgical history and problem list.    Allergies: Patient has no known allergies.    Review of Systems   Constitutional: Negative.    HENT: Negative.    Eyes: Negative.    Respiratory: Negative.    Cardiovascular: Negative.    Gastrointestinal: Negative.    Endocrine: Negative.    Genitourinary: Negative.    Musculoskeletal: Negative.    Skin: Negative.    Allergic/Immunologic: Negative.    Neurological: Negative.    Hematological: Negative.    Psychiatric/Behavioral: Negative.      Objective     Wt Readings from Last 3 Encounters:   08/03/20 109 kg (240 lb)   03/25/20 112 kg (246 lb)   03/11/20 111 kg (245 lb)     Temp Readings from Last 3 Encounters:   08/03/20 97.5 °F (36.4 °C) (Temporal)   03/11/20 98 °F (36.7 °C) (Oral)   01/27/20 97.5 °F (36.4 °C) (Oral)     BP Readings from Last 3 Encounters:   08/03/20 138/78   03/25/20 132/74   03/11/20 149/92     Pulse Readings from Last 3 Encounters:   08/03/20 59   03/25/20 86   03/11/20 64     Body mass index is 36.49 kg/m².  SpO2 Readings from Last 3 Encounters:   01/04/18 98%   06/29/17 94%   05/23/17 97%       Physical Exam   Constitutional: He is  oriented to person, place, and time. He appears well-developed and well-nourished.   HENT:   Head: Normocephalic and atraumatic.   Eyes: Conjunctivae and EOM are normal.   Neck: Neck supple. No thyromegaly present.   Cardiovascular: Normal rate, regular rhythm and normal heart sounds.   No murmur heard.  Pulmonary/Chest: Effort normal and breath sounds normal.   Abdominal: Soft. There is no tenderness.   Musculoskeletal: Normal range of motion. He exhibits no edema.   Neurological: He is alert and oriented to person, place, and time.   Skin: Skin is warm and dry.   Psychiatric: He has a normal mood and affect. His behavior is normal.   Nursing note and vitals reviewed.    Assessment/Plan   Berhane was seen today for medicare wellness-subsequent, hypertension, hyperlipidemia, hypogonadism and prediabetes.    Diagnoses and all orders for this visit:    Prediabetes  -     Hemoglobin A1c; Future  -     Vitamin B12; Future    Hyperlipidemia, unspecified hyperlipidemia type  -     simvastatin (ZOCOR) 40 MG tablet; Take 1 tablet by mouth Daily. AT BEDTIME  -     Comprehensive Metabolic Panel; Future  -     Lipid Panel With / Chol / HDL Ratio; Future  -     TSH; Future    Benign prostatic hyperplasia with lower urinary tract symptoms, symptom details unspecified    Hypogonadism in male  -     CBC & Differential; Future    History of basal cell carcinoma    Supraventricular tachycardia (CMS/HCC)    History of colon polyps    ED (erectile dysfunction) of non-organic origin    Medicare annual wellness visit, subsequent  -     Hepatitis C Antibody; Future  -     US AAA Screen Limited; Future    1. Hyperlipidemia.  Well-controlled.  Continue simvastatin and lifestyle measures.    2. Prediabetes.  A1c 5.6, up from 5.5.  Stay off metformin and continue lifestyle measures.     3. BPH.  Symptoms controlled.  Per Dr. Dumont.    4. Hypogonadism.  Has Testopel implants per Dr. Dumont.    5. Hx BCC.  Followed every 6 months by   Charanjit.    6. SVT.  He sees Dr. Morgan.  He has a loop recorder in place and is on bisoprolol and flecainide.  Doing well.    7. History of colon polyps.  Colonoscopy 3/2019 showed tubular adenomas.  Dr. Dai recommended repeat in 3 years.     9. Routine health maint.  Pneumococcal vaccines UTD.  Had Shingrix at pharmacy. Medicare wellness today.  AAA screening u/s.    10.  E. D. Controlled with sildenafil per Dr. Donohue.    Outpatient Medications Prior to Visit   Medication Sig Dispense Refill   • aspirin 81 MG EC tablet Take 81 mg by mouth Daily.     • bisoprolol (ZEBeta) 5 MG tablet Take 5 mg by mouth Daily.     • Cholecalciferol (VITAMIN D3) 2000 units capsule Take 2,000 Units by mouth Daily.     • flecainide (TAMBOCOR) 50 MG tablet Take 50 mg by mouth 2 (Two) Times a Day.     • Garlic 1000 MG capsule Take 1,000 mg by mouth Daily.     • Glucosamine-Chondroit-Vit C-Mn (GLUCOSAMINE 1500 COMPLEX PO) Take 1 tablet by mouth Daily.     • L-Lysine 1000 MG tablet Take 1,000 mg by mouth Daily.     • lisinopril (PRINIVIL,ZESTRIL) 5 MG tablet Take 5 mg by mouth Daily.     • sildenafil (REVATIO) 20 MG tablet Take 20 mg by mouth As Needed.     • tamsulosin (FLOMAX) 0.4 MG capsule 24 hr capsule Take 1 capsule by mouth Daily. 90 capsule 3   • Testosterone (TESTOPEL IL) by Implant route. Injections Twice a Year.     • simvastatin (ZOCOR) 40 MG tablet Take 1 tablet by mouth Daily. AT BEDTIME (Patient taking differently: Take 40 mg by mouth Every Night. AT BEDTIME) 90 tablet 3     No facility-administered medications prior to visit.      New Medications Ordered This Visit   Medications   • simvastatin (ZOCOR) 40 MG tablet     Sig: Take 1 tablet by mouth Daily. AT BEDTIME     Dispense:  90 tablet     Refill:  3     [unfilled]  Medications Discontinued During This Encounter   Medication Reason   • simvastatin (ZOCOR) 40 MG tablet Reorder       Return in about 6 months (around 2/3/2021).

## 2020-08-17 ENCOUNTER — HOSPITAL ENCOUNTER (OUTPATIENT)
Dept: ULTRASOUND IMAGING | Facility: HOSPITAL | Age: 70
Discharge: HOME | End: 2020-08-17

## 2020-08-17 DIAGNOSIS — Z00.00 MEDICARE ANNUAL WELLNESS VISIT, SUBSEQUENT: ICD-10-CM

## 2020-11-19 ENCOUNTER — OFFICE VISIT (OUTPATIENT)
Dept: INTERNAL MEDICINE | Facility: CLINIC | Age: 70
End: 2020-11-19

## 2020-11-19 VITALS
TEMPERATURE: 97.5 F | BODY MASS INDEX: 37.16 KG/M2 | SYSTOLIC BLOOD PRESSURE: 108 MMHG | HEART RATE: 78 BPM | HEIGHT: 68 IN | WEIGHT: 245.2 LBS | OXYGEN SATURATION: 97 % | DIASTOLIC BLOOD PRESSURE: 60 MMHG | RESPIRATION RATE: 16 BRPM

## 2020-11-19 DIAGNOSIS — H60.392 OTHER INFECTIVE ACUTE OTITIS EXTERNA OF LEFT EAR: Primary | ICD-10-CM

## 2020-11-19 DIAGNOSIS — J02.9 SORETHROAT: ICD-10-CM

## 2020-11-19 DIAGNOSIS — H60.543 ECZEMA OF EXTERNAL EAR, BILATERAL: ICD-10-CM

## 2020-11-19 LAB
EXPIRATION DATE: NORMAL
INTERNAL CONTROL: NORMAL
Lab: NORMAL
S PYO AG THROAT QL: NEGATIVE

## 2020-11-19 PROCEDURE — 99213 OFFICE O/P EST LOW 20 MIN: CPT | Performed by: FAMILY MEDICINE

## 2020-11-19 PROCEDURE — 87880 STREP A ASSAY W/OPTIC: CPT | Performed by: FAMILY MEDICINE

## 2020-11-19 RX ORDER — CIPROFLOXACIN AND DEXAMETHASONE 3; 1 MG/ML; MG/ML
4 SUSPENSION/ DROPS AURICULAR (OTIC) 2 TIMES DAILY
Qty: 7.5 ML | Refills: 0 | Status: SHIPPED | OUTPATIENT
Start: 2020-11-19 | End: 2021-02-03

## 2020-11-19 RX ORDER — INFLUENZA A VIRUS A/MICHIGAN/45/2015 X-275 (H1N1) ANTIGEN (FORMALDEHYDE INACTIVATED), INFLUENZA A VIRUS A/SINGAPORE/INFIMH-16-0019/2016 IVR-186 (H3N2) ANTIGEN (FORMALDEHYDE INACTIVATED), INFLUENZA B VIRUS B/PHUKET/3073/2013 ANTIGEN (FORMALDEHYDE INACTIVATED), AND INFLUENZA B VIRUS B/MARYLAND/15/2016 BX-69A ANTIGEN (FORMALDEHYDE INACTIVATED) 60; 60; 60; 60 UG/.7ML; UG/.7ML; UG/.7ML; UG/.7ML
INJECTION, SUSPENSION INTRAMUSCULAR
COMMUNITY
Start: 2020-10-06 | End: 2021-02-03

## 2020-11-19 RX ORDER — DESONIDE 0.5 MG/ML
LOTION TOPICAL 2 TIMES DAILY
Qty: 118 ML | Refills: 0 | Status: SHIPPED | OUTPATIENT
Start: 2020-11-19 | End: 2022-06-30

## 2020-11-19 NOTE — PROGRESS NOTES
Berhane Chaidez is a 70 y.o. male, who presents with a chief complaint of   Chief Complaint   Patient presents with   • Sore Throat   • Earache     left ear       HPI     Pt presents the complaint of sore throat and left earache X 1 week.  He has a runny nose.  Denies sinus pressure and fever and cough.  No sick contacts.  Appetite is okay.  He can smell and taste.  Denies vomiting and diarrhea.  He states that he has chronic itching, flaking, and dryness of his ear canals.  Over the past week the ear canal has been tender.  No drainage.    The following portions of the patient's history were reviewed and updated as appropriate: allergies, current medications, past family history, past medical history, past social history, past surgical history and problem list.    Allergies: Patient has no known allergies.    Review of Systems   Constitutional: Negative for appetite change and fever.   HENT: Positive for ear pain and rhinorrhea. Negative for sinus pressure and sinus pain.    Eyes: Negative.    Respiratory: Negative for cough.    Cardiovascular: Negative.    Gastrointestinal: Negative for nausea and vomiting.   Endocrine: Negative.    Skin: Negative for rash.             Wt Readings from Last 3 Encounters:   11/19/20 111 kg (245 lb 3.2 oz)   08/03/20 109 kg (240 lb)   03/25/20 112 kg (246 lb)     Temp Readings from Last 3 Encounters:   11/19/20 97.5 °F (36.4 °C) (Temporal)   08/03/20 97.5 °F (36.4 °C) (Temporal)   03/11/20 98 °F (36.7 °C) (Oral)     BP Readings from Last 3 Encounters:   11/19/20 108/60   08/03/20 138/78   03/25/20 132/74     Pulse Readings from Last 3 Encounters:   11/19/20 78   08/03/20 59   03/25/20 86     Body mass index is 37.29 kg/m².  @LASTSAO2(3)@    Physical Exam  Vitals signs and nursing note reviewed.   Constitutional:       General: He is not in acute distress.     Appearance: Normal appearance. He is not ill-appearing.   HENT:      Head: Normocephalic and atraumatic.      Right Ear:  Tympanic membrane normal.      Left Ear: Tympanic membrane normal. Swelling and tenderness present.      Nose:      Right Turbinates: Swollen.      Left Turbinates: Swollen.      Right Sinus: No maxillary sinus tenderness or frontal sinus tenderness.      Left Sinus: No maxillary sinus tenderness or frontal sinus tenderness.      Mouth/Throat:      Comments: Cobblestoning.  Neurological:      Mental Status: He is alert.         Results for orders placed or performed in visit on 11/19/20   POCT rapid strep A    Specimen: Swab   Result Value Ref Range    Rapid Strep A Screen Negative Negative, VALID, INVALID, Not Performed    Internal Control Passed Passed    Lot Number [id number]     Expiration Date 04/30/2021            Diagnoses and all orders for this visit:    1. Other infective acute otitis externa of left ear (Primary)  -     ciprofloxacin-dexamethasone (Ciprodex) 0.3-0.1 % otic suspension; Administer 4 drops into the left ear 2 (Two) Times a Day.  Dispense: 7.5 mL; Refill: 0    2. Eczema of external ear, bilateral  -     desonide (DesOwen) 0.05 % lotion; Apply  topically to the appropriate area as directed 2 (Two) Times a Day.  Dispense: 118 mL; Refill: 0    3. Sorethroat  -     POCT rapid strep A      1. Left otitis externa.  Treat with Ciprodex gtt.  Anticipatory guidance given.    2. Eczema of ear canals.  Discussed using desonide intermittently as needed (when OE resolved).    3. Sore throat due to PND.  Add nasal steroid.      Outpatient Medications Prior to Visit   Medication Sig Dispense Refill   • aspirin 81 MG EC tablet Take 81 mg by mouth Daily.     • bisoprolol (ZEBeta) 5 MG tablet Take 5 mg by mouth Daily.     • Cholecalciferol (VITAMIN D3) 2000 units capsule Take 2,000 Units by mouth Daily.     • flecainide (TAMBOCOR) 50 MG tablet Take 50 mg by mouth 2 (Two) Times a Day.     • Garlic 1000 MG capsule Take 1,000 mg by mouth Daily.     • Glucosamine-Chondroit-Vit C-Mn (GLUCOSAMINE 1500 COMPLEX PO)  Take 1 tablet by mouth Daily.     • L-Lysine 1000 MG tablet Take 1,000 mg by mouth Daily.     • lisinopril (PRINIVIL,ZESTRIL) 5 MG tablet Take 5 mg by mouth Daily.     • sildenafil (REVATIO) 20 MG tablet Take 20 mg by mouth As Needed.     • simvastatin (ZOCOR) 40 MG tablet Take 1 tablet by mouth Daily. AT BEDTIME 90 tablet 3   • tamsulosin (FLOMAX) 0.4 MG capsule 24 hr capsule Take 1 capsule by mouth Daily. 90 capsule 3   • Testosterone (TESTOPEL IL) by Implant route. Injections Twice a Year.     • Fluzone High-Dose Quadrivalent 0.7 ML suspension prefilled syringe ADM 0.7ML IM UTD       No facility-administered medications prior to visit.      New Medications Ordered This Visit   Medications   • ciprofloxacin-dexamethasone (Ciprodex) 0.3-0.1 % otic suspension     Sig: Administer 4 drops into the left ear 2 (Two) Times a Day.     Dispense:  7.5 mL     Refill:  0   • desonide (DesOwen) 0.05 % lotion     Sig: Apply  topically to the appropriate area as directed 2 (Two) Times a Day.     Dispense:  118 mL     Refill:  0     [unfilled]  There are no discontinued medications.      Return if symptoms worsen or fail to improve, for Next scheduled follow up.

## 2021-01-27 ENCOUNTER — LAB (OUTPATIENT)
Dept: INTERNAL MEDICINE | Facility: CLINIC | Age: 71
End: 2021-01-27

## 2021-01-27 DIAGNOSIS — E29.1 HYPOGONADISM IN MALE: ICD-10-CM

## 2021-01-27 DIAGNOSIS — R73.03 PREDIABETES: ICD-10-CM

## 2021-01-27 DIAGNOSIS — Z00.00 MEDICARE ANNUAL WELLNESS VISIT, SUBSEQUENT: ICD-10-CM

## 2021-01-27 DIAGNOSIS — E78.5 HYPERLIPIDEMIA, UNSPECIFIED HYPERLIPIDEMIA TYPE: ICD-10-CM

## 2021-01-28 LAB
ALBUMIN SERPL-MCNC: 4.3 G/DL (ref 3.5–5.2)
ALBUMIN/GLOB SERPL: 2.2 G/DL
ALP SERPL-CCNC: 71 U/L (ref 39–117)
ALT SERPL-CCNC: 21 U/L (ref 1–41)
AST SERPL-CCNC: 16 U/L (ref 1–40)
BASOPHILS # BLD AUTO: 0.05 10*3/MM3 (ref 0–0.2)
BASOPHILS NFR BLD AUTO: 0.9 % (ref 0–1.5)
BILIRUB SERPL-MCNC: 0.3 MG/DL (ref 0–1.2)
BUN SERPL-MCNC: 17 MG/DL (ref 8–23)
BUN/CREAT SERPL: 15.6 (ref 7–25)
CALCIUM SERPL-MCNC: 9.4 MG/DL (ref 8.6–10.5)
CHLORIDE SERPL-SCNC: 102 MMOL/L (ref 98–107)
CHOLEST SERPL-MCNC: 166 MG/DL (ref 0–200)
CHOLEST/HDLC SERPL: 2.91 {RATIO}
CO2 SERPL-SCNC: 28.8 MMOL/L (ref 22–29)
CREAT SERPL-MCNC: 1.09 MG/DL (ref 0.76–1.27)
EOSINOPHIL # BLD AUTO: 0.15 10*3/MM3 (ref 0–0.4)
EOSINOPHIL NFR BLD AUTO: 2.7 % (ref 0.3–6.2)
ERYTHROCYTE [DISTWIDTH] IN BLOOD BY AUTOMATED COUNT: 12.2 % (ref 12.3–15.4)
GLOBULIN SER CALC-MCNC: 2 GM/DL
GLUCOSE SERPL-MCNC: 106 MG/DL (ref 65–99)
HBA1C MFR BLD: 5.7 % (ref 4.8–5.6)
HCT VFR BLD AUTO: 54.2 % (ref 37.5–51)
HDLC SERPL-MCNC: 57 MG/DL (ref 40–60)
HGB BLD-MCNC: 18.2 G/DL (ref 13–17.7)
IMM GRANULOCYTES # BLD AUTO: 0.02 10*3/MM3 (ref 0–0.05)
IMM GRANULOCYTES NFR BLD AUTO: 0.4 % (ref 0–0.5)
LDLC SERPL CALC-MCNC: 92 MG/DL (ref 0–100)
LYMPHOCYTES # BLD AUTO: 0.97 10*3/MM3 (ref 0.7–3.1)
LYMPHOCYTES NFR BLD AUTO: 17.5 % (ref 19.6–45.3)
MCH RBC QN AUTO: 30.4 PG (ref 26.6–33)
MCHC RBC AUTO-ENTMCNC: 33.6 G/DL (ref 31.5–35.7)
MCV RBC AUTO: 90.5 FL (ref 79–97)
MONOCYTES # BLD AUTO: 0.52 10*3/MM3 (ref 0.1–0.9)
MONOCYTES NFR BLD AUTO: 9.4 % (ref 5–12)
NEUTROPHILS # BLD AUTO: 3.82 10*3/MM3 (ref 1.7–7)
NEUTROPHILS NFR BLD AUTO: 69.1 % (ref 42.7–76)
NRBC BLD AUTO-RTO: 0 /100 WBC (ref 0–0.2)
PLATELET # BLD AUTO: 215 10*3/MM3 (ref 140–450)
POTASSIUM SERPL-SCNC: 5 MMOL/L (ref 3.5–5.2)
PROT SERPL-MCNC: 6.3 G/DL (ref 6–8.5)
RBC # BLD AUTO: 5.99 10*6/MM3 (ref 4.14–5.8)
SODIUM SERPL-SCNC: 141 MMOL/L (ref 136–145)
TRIGL SERPL-MCNC: 94 MG/DL (ref 0–150)
TSH SERPL DL<=0.005 MIU/L-ACNC: 0.68 UIU/ML (ref 0.27–4.2)
VIT B12 SERPL-MCNC: 828 PG/ML (ref 211–946)
VLDLC SERPL CALC-MCNC: 17 MG/DL (ref 5–40)
WBC # BLD AUTO: 5.53 10*3/MM3 (ref 3.4–10.8)

## 2021-02-03 ENCOUNTER — OFFICE VISIT (OUTPATIENT)
Dept: INTERNAL MEDICINE | Facility: CLINIC | Age: 71
End: 2021-02-03

## 2021-02-03 VITALS
BODY MASS INDEX: 37.13 KG/M2 | HEIGHT: 68 IN | TEMPERATURE: 96.8 F | RESPIRATION RATE: 16 BRPM | DIASTOLIC BLOOD PRESSURE: 70 MMHG | SYSTOLIC BLOOD PRESSURE: 118 MMHG | OXYGEN SATURATION: 98 % | HEART RATE: 57 BPM | WEIGHT: 245 LBS

## 2021-02-03 DIAGNOSIS — Z86.010 HISTORY OF COLON POLYPS: ICD-10-CM

## 2021-02-03 DIAGNOSIS — Z00.00 ROUTINE HEALTH MAINTENANCE: ICD-10-CM

## 2021-02-03 DIAGNOSIS — I47.1 SUPRAVENTRICULAR TACHYCARDIA (HCC): ICD-10-CM

## 2021-02-03 DIAGNOSIS — E29.1 HYPOGONADISM IN MALE: ICD-10-CM

## 2021-02-03 DIAGNOSIS — F52.21 ED (ERECTILE DYSFUNCTION) OF NON-ORGANIC ORIGIN: ICD-10-CM

## 2021-02-03 DIAGNOSIS — N40.1 BENIGN PROSTATIC HYPERPLASIA WITH LOWER URINARY TRACT SYMPTOMS, SYMPTOM DETAILS UNSPECIFIED: ICD-10-CM

## 2021-02-03 DIAGNOSIS — E78.5 HYPERLIPIDEMIA, UNSPECIFIED HYPERLIPIDEMIA TYPE: Primary | ICD-10-CM

## 2021-02-03 DIAGNOSIS — R73.03 PREDIABETES: ICD-10-CM

## 2021-02-03 DIAGNOSIS — Z85.828 HISTORY OF BASAL CELL CARCINOMA: ICD-10-CM

## 2021-02-03 PROCEDURE — 99214 OFFICE O/P EST MOD 30 MIN: CPT | Performed by: FAMILY MEDICINE

## 2021-02-03 NOTE — PROGRESS NOTES
Subjective     Berhane Chaidez is a 70 y.o. male, who presents with a chief complaint of   Chief Complaint   Patient presents with   • Hyperlipidemia   • Prediabetes   • Hypogonadism     Hypertension    Hyperlipidemia    Diabetes    1. Hyperlipidemia.  Pt tolerating simvastatin.  He exercises regularly and tries to watch his diet.    2. Prediabetes.  He was previously on metformin but now controls this with lifestyle measures.     3. SVT.  He still has a loop recorder and is on bisoprolol and flecainide.  He reports that he is feeling well.  Denies palpitations.  He sees Dr. Morgan.    The following portions of the patient's history were reviewed and updated as appropriate: allergies, current medications, past family history, past medical history, past social history, past surgical history and problem list.    Allergies: Patient has no known allergies.    Review of Systems   Constitutional: Negative.    HENT: Negative.    Eyes: Negative.    Respiratory: Negative.    Cardiovascular: Negative.    Gastrointestinal: Negative.    Endocrine: Negative.    Genitourinary: Negative.    Musculoskeletal: Negative.    Skin: Negative.    Allergic/Immunologic: Negative.    Neurological: Negative.    Hematological: Negative.    Psychiatric/Behavioral: Negative.      Objective     Wt Readings from Last 3 Encounters:   02/03/21 111 kg (245 lb)   11/19/20 111 kg (245 lb 3.2 oz)   08/03/20 109 kg (240 lb)     Temp Readings from Last 3 Encounters:   02/03/21 96.8 °F (36 °C) (Temporal)   11/19/20 97.5 °F (36.4 °C) (Temporal)   08/03/20 97.5 °F (36.4 °C) (Temporal)     BP Readings from Last 3 Encounters:   02/03/21 118/70   11/19/20 108/60   08/03/20 138/78     Pulse Readings from Last 3 Encounters:   02/03/21 57   11/19/20 78   08/03/20 59     Body mass index is 37.25 kg/m².  SpO2 Readings from Last 3 Encounters:   01/04/18 98%   06/29/17 94%   05/23/17 97%       Physical Exam   Constitutional: He is oriented to person, place, and time.  He appears well-developed.   HENT:   Head: Normocephalic and atraumatic.   Mouth/Throat: Mucous membranes are moist.   Eyes: Conjunctivae are normal.   Neck: Neck supple. No neck rigidity. No thyromegaly present.   Cardiovascular: Normal rate, regular rhythm and normal heart sounds.   No murmur heard.  Pulmonary/Chest: Effort normal and breath sounds normal.   Abdominal: Soft. Normal appearance. There is no abdominal tenderness.   Musculoskeletal: Normal range of motion.      Right lower leg: No edema.      Left lower leg: No edema.   Neurological: He is alert and oriented to person, place, and time.   Skin: Skin is warm and dry.   Psychiatric: His behavior is normal. Mood normal.   Nursing note and vitals reviewed.    Assessment/Plan   Diagnoses and all orders for this visit:    1. Hyperlipidemia, unspecified hyperlipidemia type (Primary)  -     Comprehensive Metabolic Panel; Future  -     Lipid Panel With / Chol / HDL Ratio; Future  -     TSH; Future    2. Prediabetes  -     Hemoglobin A1c; Future  -     Vitamin B12; Future    3. Benign prostatic hyperplasia with lower urinary tract symptoms, symptom details unspecified    4. Hypogonadism in male  -     CBC & Differential; Future    5. History of basal cell carcinoma    6. Supraventricular tachycardia (CMS/HCC)    7. History of colon polyps    8. ED (erectile dysfunction) of non-organic origin    9. Routine health maintenance    1. Hyperlipidemia.  Well-controlled.  Continue simvastatin and lifestyle measures.    2. Prediabetes.  A1c 5.7, up from 5.6.  Stay off metformin and continue lifestyle measures.     3. BPH.  Symptoms controlled.  Per Dr. Dumont.    4. Hypogonadism.  Has Testopel implants per Dr. Dumont.    5. Hx BCC.  Followed every 6 months by Dr. Donohue.    6. SVT.  He sees Dr. Morgan.  He has a loop recorder in place and is on bisoprolol and flecainide.  Doing well.    7. History of colon polyps.  Colonoscopy 3/2019 showed tubular adenomas.    Malaika recommended repeat in 3 years.     9. Routine health maint.  Pneumococcal vaccines UTD.  Had Shingrix at pharmacy.  AAA screening u/s wasn't done; we'll re-order this at his next Medicare Wellness.    10.  E. D. Controlled with sildenafil per Dr. Dumont.    Outpatient Medications Prior to Visit   Medication Sig Dispense Refill   • aspirin 81 MG EC tablet Take 81 mg by mouth Daily.     • bisoprolol (ZEBeta) 5 MG tablet Take 5 mg by mouth Daily.     • Cholecalciferol (VITAMIN D3) 2000 units capsule Take 2,000 Units by mouth Daily.     • desonide (DesOwen) 0.05 % lotion Apply  topically to the appropriate area as directed 2 (Two) Times a Day. 118 mL 0   • flecainide (TAMBOCOR) 50 MG tablet Take 50 mg by mouth 2 (Two) Times a Day.     • Garlic 1000 MG capsule Take 1,000 mg by mouth Daily.     • Glucosamine-Chondroit-Vit C-Mn (GLUCOSAMINE 1500 COMPLEX PO) Take 1 tablet by mouth Daily.     • L-Lysine 1000 MG tablet Take 1,000 mg by mouth Daily.     • sildenafil (REVATIO) 20 MG tablet Take 20 mg by mouth As Needed.     • simvastatin (ZOCOR) 40 MG tablet Take 1 tablet by mouth Daily. AT BEDTIME 90 tablet 3   • tamsulosin (FLOMAX) 0.4 MG capsule 24 hr capsule Take 1 capsule by mouth Daily. 90 capsule 3   • Testosterone (TESTOPEL IL) by Implant route. Injections Twice a Year.     • ciprofloxacin-dexamethasone (Ciprodex) 0.3-0.1 % otic suspension Administer 4 drops into the left ear 2 (Two) Times a Day. 7.5 mL 0   • lisinopril (PRINIVIL,ZESTRIL) 5 MG tablet Take 5 mg by mouth Daily.     • Fluzone High-Dose Quadrivalent 0.7 ML suspension prefilled syringe ADM 0.7ML IM UTD       No facility-administered medications prior to visit.      No orders of the defined types were placed in this encounter.    [unfilled]  Medications Discontinued During This Encounter   Medication Reason   • ciprofloxacin-dexamethasone (Ciprodex) 0.3-0.1 % otic suspension *Therapy completed   • Fluzone High-Dose Quadrivalent 0.7 ML suspension  prefilled syringe *Therapy completed       Return in about 6 months (around 8/3/2021).

## 2021-03-21 DIAGNOSIS — N40.1 BENIGN NON-NODULAR PROSTATIC HYPERPLASIA WITH LOWER URINARY TRACT SYMPTOMS: ICD-10-CM

## 2021-03-22 RX ORDER — TAMSULOSIN HYDROCHLORIDE 0.4 MG/1
CAPSULE ORAL
Qty: 90 CAPSULE | Refills: 2 | Status: SHIPPED | OUTPATIENT
Start: 2021-03-22 | End: 2021-12-28 | Stop reason: SDUPTHER

## 2021-04-07 ENCOUNTER — TRANSCRIBE ORDERS (OUTPATIENT)
Dept: PREADMISSION TESTING | Facility: HOSPITAL | Age: 71
End: 2021-04-07

## 2021-04-12 ENCOUNTER — HOSPITAL ENCOUNTER (OUTPATIENT)
Dept: GENERAL RADIOLOGY | Facility: HOSPITAL | Age: 71
Discharge: HOME | End: 2021-04-12

## 2021-04-12 ENCOUNTER — PRE-ADMISSION TESTING (OUTPATIENT)
Dept: PREADMISSION TESTING | Facility: HOSPITAL | Age: 71
End: 2021-04-12

## 2021-04-12 VITALS
HEIGHT: 68 IN | RESPIRATION RATE: 20 BRPM | OXYGEN SATURATION: 96 % | WEIGHT: 246.6 LBS | BODY MASS INDEX: 37.38 KG/M2 | SYSTOLIC BLOOD PRESSURE: 145 MMHG | DIASTOLIC BLOOD PRESSURE: 88 MMHG | TEMPERATURE: 98.6 F | HEART RATE: 65 BPM

## 2021-04-12 LAB
ALBUMIN SERPL-MCNC: 4.3 G/DL (ref 3.5–5.2)
ALBUMIN/GLOB SERPL: 2.3 G/DL
ALP SERPL-CCNC: 68 U/L (ref 39–117)
ALT SERPL W P-5'-P-CCNC: 28 U/L (ref 1–41)
ANION GAP SERPL CALCULATED.3IONS-SCNC: 3.6 MMOL/L (ref 5–15)
APTT PPP: 25.5 SECONDS (ref 22.7–35.4)
AST SERPL-CCNC: 19 U/L (ref 1–40)
BACTERIA UR QL AUTO: NORMAL /HPF
BASOPHILS # BLD AUTO: 0.05 10*3/MM3 (ref 0–0.2)
BASOPHILS NFR BLD AUTO: 0.7 % (ref 0–1.5)
BILIRUB SERPL-MCNC: 0.4 MG/DL (ref 0–1.2)
BILIRUB UR QL STRIP: NEGATIVE
BUN SERPL-MCNC: 16 MG/DL (ref 8–23)
BUN/CREAT SERPL: 17.4 (ref 7–25)
CALCIUM SPEC-SCNC: 8.8 MG/DL (ref 8.6–10.5)
CHLORIDE SERPL-SCNC: 102 MMOL/L (ref 98–107)
CLARITY UR: CLEAR
CO2 SERPL-SCNC: 30.4 MMOL/L (ref 22–29)
COLOR UR: YELLOW
CREAT SERPL-MCNC: 0.92 MG/DL (ref 0.76–1.27)
DEPRECATED RDW RBC AUTO: 43.1 FL (ref 37–54)
EOSINOPHIL # BLD AUTO: 0.18 10*3/MM3 (ref 0–0.4)
EOSINOPHIL NFR BLD AUTO: 2.7 % (ref 0.3–6.2)
ERYTHROCYTE [DISTWIDTH] IN BLOOD BY AUTOMATED COUNT: 14.1 % (ref 12.3–15.4)
GFR SERPL CREATININE-BSD FRML MDRD: 81 ML/MIN/1.73
GLOBULIN UR ELPH-MCNC: 1.9 GM/DL
GLUCOSE SERPL-MCNC: 105 MG/DL (ref 65–99)
GLUCOSE UR STRIP-MCNC: NEGATIVE MG/DL
HCT VFR BLD AUTO: 43.5 % (ref 37.5–51)
HGB BLD-MCNC: 14.7 G/DL (ref 13–17.7)
HGB UR QL STRIP.AUTO: ABNORMAL
HYALINE CASTS UR QL AUTO: NORMAL /LPF
IMM GRANULOCYTES # BLD AUTO: 0.03 10*3/MM3 (ref 0–0.05)
IMM GRANULOCYTES NFR BLD AUTO: 0.4 % (ref 0–0.5)
INR PPP: 0.98 (ref 0.9–1.1)
KETONES UR QL STRIP: NEGATIVE
LEUKOCYTE ESTERASE UR QL STRIP.AUTO: NEGATIVE
LYMPHOCYTES # BLD AUTO: 0.68 10*3/MM3 (ref 0.7–3.1)
LYMPHOCYTES NFR BLD AUTO: 10.2 % (ref 19.6–45.3)
MCH RBC QN AUTO: 28.8 PG (ref 26.6–33)
MCHC RBC AUTO-ENTMCNC: 33.8 G/DL (ref 31.5–35.7)
MCV RBC AUTO: 85.3 FL (ref 79–97)
MONOCYTES # BLD AUTO: 0.56 10*3/MM3 (ref 0.1–0.9)
MONOCYTES NFR BLD AUTO: 8.4 % (ref 5–12)
NEUTROPHILS NFR BLD AUTO: 5.18 10*3/MM3 (ref 1.7–7)
NEUTROPHILS NFR BLD AUTO: 77.6 % (ref 42.7–76)
NITRITE UR QL STRIP: NEGATIVE
NRBC BLD AUTO-RTO: 0 /100 WBC (ref 0–0.2)
PH UR STRIP.AUTO: 5.5 [PH] (ref 5–8)
PLATELET # BLD AUTO: 237 10*3/MM3 (ref 140–450)
PMV BLD AUTO: 9.1 FL (ref 6–12)
POTASSIUM SERPL-SCNC: 4.3 MMOL/L (ref 3.5–5.2)
PROT SERPL-MCNC: 6.2 G/DL (ref 6–8.5)
PROT UR QL STRIP: NEGATIVE
PROTHROMBIN TIME: 12.8 SECONDS (ref 11.7–14.2)
QT INTERVAL: 361 MS
RBC # BLD AUTO: 5.1 10*6/MM3 (ref 4.14–5.8)
RBC # UR: NORMAL /HPF
REF LAB TEST METHOD: NORMAL
SODIUM SERPL-SCNC: 136 MMOL/L (ref 136–145)
SP GR UR STRIP: 1.02 (ref 1–1.03)
SQUAMOUS #/AREA URNS HPF: NORMAL /HPF
UROBILINOGEN UR QL STRIP: ABNORMAL
WBC # BLD AUTO: 6.68 10*3/MM3 (ref 3.4–10.8)
WBC UR QL AUTO: NORMAL /HPF

## 2021-04-12 PROCEDURE — 63710000001 MUPIROCIN 2 % OINTMENT: Performed by: ORTHOPAEDIC SURGERY

## 2021-04-12 PROCEDURE — 71046 X-RAY EXAM CHEST 2 VIEWS: CPT

## 2021-04-12 PROCEDURE — A9270 NON-COVERED ITEM OR SERVICE: HCPCS | Performed by: ORTHOPAEDIC SURGERY

## 2021-04-12 PROCEDURE — 85610 PROTHROMBIN TIME: CPT

## 2021-04-12 PROCEDURE — 85730 THROMBOPLASTIN TIME PARTIAL: CPT

## 2021-04-12 PROCEDURE — 80053 COMPREHEN METABOLIC PANEL: CPT

## 2021-04-12 PROCEDURE — 93005 ELECTROCARDIOGRAM TRACING: CPT

## 2021-04-12 PROCEDURE — 81001 URINALYSIS AUTO W/SCOPE: CPT

## 2021-04-12 PROCEDURE — 73560 X-RAY EXAM OF KNEE 1 OR 2: CPT

## 2021-04-12 PROCEDURE — 36415 COLL VENOUS BLD VENIPUNCTURE: CPT

## 2021-04-12 PROCEDURE — 93010 ELECTROCARDIOGRAM REPORT: CPT | Performed by: INTERNAL MEDICINE

## 2021-04-12 PROCEDURE — 85025 COMPLETE CBC W/AUTO DIFF WBC: CPT

## 2021-04-12 RX ORDER — LISINOPRIL 10 MG/1
10 TABLET ORAL DAILY
COMMUNITY

## 2021-04-12 ASSESSMENT — KOOS JR
KOOS JR SCORE: 12
KOOS JR SCORE: 57.14

## 2021-04-12 NOTE — DISCHARGE INSTRUCTIONS
Take the following medications the morning of surgery:  BISOPROLOL, FLECAINIDE      ARRIVE TO MAIN OR DESK 4-26-21 AT 7:00AM      If you are on prescription narcotic pain medication to control your pain you may also take that medication the morning of surgery.    General Instructions:  • Do not eat solid food after midnight the night before surgery.  • You may drink clear liquids day of surgery but must stop at least one hour before your hospital arrival time.(6:00AM)  • It is beneficial for you to have a clear drink that contains carbohydrates the day of surgery.  We suggest a 12 to 20 ounce bottle of Gatorade or Powerade for non-diabetic patients or a 12 to 20 ounce bottle of G2 or Powerade Zero for diabetic patients. (Pediatric patients, are not advised to drink a 12 to 20 ounce carbohydrate drink)    Clear liquids are liquids you can see through.  Nothing red in color.     Plain water                               Sports drinks  Sodas                                   Gelatin (Jell-O)  Fruit juices without pulp such as white grape juice and apple juice  Popsicles that contain no fruit or yogurt  Tea or coffee (no cream or milk added)  Gatorade / Powerade  G2 / Powerade Zero    • Infants may have breast milk up to four hours before surgery.  • Infants drinking formula may drink formula up to six hours before surgery.   • Patients who avoid smoking, chewing tobacco and alcohol for 4 weeks prior to surgery have a reduced risk of post-operative complications.  Quit smoking as many days before surgery as you can.  • Do not smoke, use chewing tobacco or drink alcohol the day of surgery.   • If applicable bring your C-PAP/ BI-PAP machine.  • Bring any papers given to you in the doctor’s office.  • Wear clean comfortable clothes.  • Do not wear contact lenses, false eyelashes or make-up.  Bring a case for your glasses.   • Bring crutches or walker if applicable.  • Remove all piercings.  Leave jewelry and any other  valuables at home.  • Hair extensions with metal clips must be removed prior to surgery.  • The Pre-Admission Testing nurse will instruct you to bring medications if unable to obtain an accurate list in Pre-Admission Testing.         Preventing a Surgical Site Infection:  • For 2 to 3 days before surgery, avoid shaving with a razor because the razor can irritate skin and make it easier to develop an infection.    • Any areas of open skin can increase the risk of a post-operative wound infection by allowing bacteria to enter and travel throughout the body.  Notify your surgeon if you have any skin wounds / rashes even if it is not near the expected surgical site.  The area will need assessed to determine if surgery should be delayed until it is healed.  • The night prior to surgery shower using a fresh bar of anti-bacterial soap (such as Dial) and clean washcloth.  Sleep in a clean bed with clean clothing.  Do not allow pets to sleep with you.  • Shower on the morning of surgery using a fresh bar of anti-bacterial soap (such as Dial) and clean washcloth.  Dry with a clean towel and dress in clean clothing.  • Ask your surgeon if you will be receiving antibiotics prior to surgery.  • Make sure you, your family, and all healthcare providers clean their hands with soap and water or an alcohol based hand  before caring for you or your wound.    Day of surgery:  Your arrival time is approximately two hours before your scheduled surgery time.  Upon arrival, a Pre-op nurse and Anesthesiologist will review your health history, obtain vital signs, and answer questions you may have.  The only belongings needed at this time will be a list of your home medications and if applicable your C-PAP/BI-PAP machine.  A Pre-op nurse will start an IV and you may receive medication in preparation for surgery, including something to help you relax.     Please be aware that surgery does come with discomfort.  We want to make every  effort to control your discomfort so please discuss any uncontrolled symptoms with your nurse.   Your doctor will most likely have prescribed pain medications.      If you are going home after surgery you will receive individualized written care instructions before being discharged.  A responsible adult must drive you to and from the hospital on the day of your surgery and stay with you for 24 hours.  Discharge prescriptions can be filled by the hospital pharmacy during regular pharmacy hours.  If you are having surgery late in the day/evening your prescription may be e-prescribed to your pharmacy.  Please verify your pharmacy hours or chose a 24 hour pharmacy to avoid not having access to your prescription because your pharmacy has closed for the day.    If you are staying overnight following surgery, you will be transported to your hospital room following the recovery period.  [facility] has all private rooms.    If you have any questions please call Pre-Admission Testing at [phone].  Deductibles and co-payments are collected on the day of service. Please be prepared to pay the required co-pay, deductible or deposit on the day of service as defined by your plan.    Patient Education for Self-Quarantine Process    Following your COVID testing, we strongly recommend that you do not leave your home after you have been tested for COVID except to get medical care. This includes not going to work, school or to public areas.  If this is not possible for you to do please limit your activities to only required outings.  Be sure to wear a mask when you are with other people, practice social distancing and wash your hands frequently.      The following items provide additional details to keep you safe.  • Wash your hands with soap and water frequently for at least 20 seconds.   • Avoid touching your eyes, nose and mouth with unwashed hands.  • Do not share anything - utensils, towels, food from the same  bowl.   • Have your own utensils, drinking glass, dishes, towels and bedding.   • Do not have visitors.   • Do use FaceTime to stay in touch with family and friends.  • You should stay in a specific room away from others if possible.   • Stay at least 6 feet away from others in the home if you cannot have a dedicated room to yourself.   • Do not snuggle with your pet. While the CDC says there is no evidence that pets can spread COVID-19 or be infected from humans, it is probably best to avoid “petting, snuggling, being kissed or licked and sharing food (during self-quarantine)”, according to the CDC.   • Sanitize household surfaces daily. Include all high touch areas (door handles, light switches, phones, countertops, etc.)  • Do not share a bathroom with others, if possible.   • Wear a mask around others in your home if you are unable to stay in a separate room or 6 feet apart. If  you are unable to wear a mask, have your family member wear a mask if they must be within 6 feet of you.   Call your surgeon immediately if you experience any of the following symptoms:  • Sore Throat  • Shortness of Breath or difficulty breathing  • Cough  • Chills  • Body soreness or muscle pain  • Headache  • Fever  • New loss of taste or smell  • Do not arrive for your surgery ill.  Your procedure will need to be rescheduled to another time.  You will need to call your physician before the day of surgery to avoid any unnecessary exposure to hospital staff as well as other patients.    CHLORHEXIDINE CLOTH INSTRUCTIONS  The morning of surgery follow these instructions using the Chlorhexidine cloths you've been given.  These steps reduce bacteria on the body.  Do not use the cloths near your eyes, ears mouth, genitalia or on open wounds.  Throw the cloths away after use but do not try to flush them down a toilet.      • Open and remove one cloth at a time from the package.    • Leave the cloth unfolded and begin the bathing.  • Massage  the skin with the cloths using gentle pressure to remove bacteria.  Do not scrub harshly.   • Follow the steps below with one 2% CHG cloth per area (6 total cloths).  • One cloth for neck, shoulders and chest.  • One cloth for both arms, hands, fingers and underarms (do underarms last).  • One cloth for the abdomen followed by groin.  • One cloth for right leg and foot including between the toes.  • One cloth for left leg and foot including between the toes.  • The last cloth is to be used for the back of the neck, back and buttocks.    Allow the CHG to air dry 3 minutes on the skin which will give it time to work and decrease the chance of irritation.  The skin may feel sticky until it is dry.  Do not rinse with water or any other liquid or you will lose the beneficial effects of the CHG.  If mild skin irritation occurs, do rinse the skin to remove the CHG.  Report this to the nurse at time of admission.  Do not apply lotions, creams, ointments, deodorants or perfumes after using the clothes. Dress in clean clothes before coming to the hospital.    BACTROBAN NASAL OINTMENT  There are many germs normally in your nose. Bactroban is an ointment that will help reduce these germs. Please follow these instructions for Bactroban use:      ____The day before surgery in the morning  Date________    ____The day before surgery in the evening              Date________    ____The day of surgery in the morning    Date________    **Squirt ½ package of Bactroban Ointment onto a cotton applicator and apply to inside of 1st nostril.  Squirt the remaining Bactroban and apply to the inside of the other nostril.

## 2021-04-23 ENCOUNTER — LAB (OUTPATIENT)
Dept: LAB | Facility: HOSPITAL | Age: 71
End: 2021-04-23

## 2021-04-23 PROCEDURE — C9803 HOPD COVID-19 SPEC COLLECT: HCPCS

## 2021-04-23 PROCEDURE — U0004 COV-19 TEST NON-CDC HGH THRU: HCPCS

## 2021-04-23 NOTE — CASE MANAGEMENT/SOCIAL WORK
Pre Op Ortho Assessment    [facility]     Patient Name: Berhane Chaidez  MRN: [id number]  Today's Date: 4/23/2021        PRE-OPERATIVE ORTHOPEDIC ASSESSMENT     Row Name 04/23/21 1407       Question    What is your age group?  1    Gender?  2    How far on average can you walk? (a block is 200 meters)  2    Which gait aid do you use? (more often than not)  1    Do you use community supports: (home help, meals on wheels, district nursing)  1    Will you live with someone who can care for you after your operation?  3    Your Score (out of 12)  10       Discharge Disposition/Planning:    Discussed the predicted discharge disposition needed based on RAPT Assessment with the patient  Yes    Patient Selected Discharge Disposition:  Home Health    Outpatient Rehabilitation Facility of Choice:  other *see comment [facility]    Home Health Services Preferred:  Other *see comment [facility] and requesting [facility]    Post-operative Caregiver Name and Phone Number  Spouse Nini [phone]       Home Equipment    Does patient have a walker for home use?  Yes    Does patient have a 3 in 1 commode for home use?  No    Does patient have a shower chair for home use?  Yes    Does patient have an elevated commode seat for home use?  Yes    Does patient have a cane for home use?  Yes    Is there any other medical equipment in the home?  No       Pre-Operative Class Attendance    Attended or scheduled to attend the pre-operative class within 1 year of total joint replacement?  Yes       Patient Education    Expected time of discharge discussed  Yes    Encouraged to attend pre-operative class  Yes    Education regarding predicted discharge disposition based on RAPT score  Yes    Patient receptive and voiced understanding of information given  Yes          Plan is to return home with [facility] and then attend [facility].  He does use a cane now and has 15 stairs in his house.  His wife will be his caregiver and he has had several joint  replacements and has taken the class in the past.  Shannon Epley, RN

## 2021-04-24 LAB — SARS-COV-2 ORF1AB RESP QL NAA+PROBE: NOT DETECTED

## 2021-04-26 ENCOUNTER — ANESTHESIA EVENT (OUTPATIENT)
Dept: PERIOP | Facility: HOSPITAL | Age: 71
End: 2021-04-26

## 2021-04-26 ENCOUNTER — ANESTHESIA (OUTPATIENT)
Dept: PERIOP | Facility: HOSPITAL | Age: 71
End: 2021-04-26

## 2021-04-26 ENCOUNTER — APPOINTMENT (OUTPATIENT)
Dept: GENERAL RADIOLOGY | Facility: HOSPITAL | Age: 71
End: 2021-04-26

## 2021-04-26 ENCOUNTER — HOSPITAL ENCOUNTER (OUTPATIENT)
Facility: HOSPITAL | Age: 71
Discharge: HOME | End: 2021-04-27
Attending: ORTHOPAEDIC SURGERY | Admitting: ORTHOPAEDIC SURGERY

## 2021-04-26 DIAGNOSIS — M17.12 PRIMARY OSTEOARTHRITIS OF LEFT KNEE: Primary | ICD-10-CM

## 2021-04-26 PROBLEM — M19.90 DJD (DEGENERATIVE JOINT DISEASE): Status: ACTIVE | Noted: 2021-04-26

## 2021-04-26 PROBLEM — M17.9 DJD (DEGENERATIVE JOINT DISEASE) OF KNEE: Status: ACTIVE | Noted: 2021-04-26

## 2021-04-26 LAB
GLUCOSE BLDC GLUCOMTR-MCNC: 138 MG/DL (ref 70–130)
GLUCOSE BLDC GLUCOMTR-MCNC: 143 MG/DL (ref 70–130)

## 2021-04-26 PROCEDURE — 63710000001 FLECAINIDE 50 MG TABLET: Performed by: ORTHOPAEDIC SURGERY

## 2021-04-26 PROCEDURE — 63710000001 TAMSULOSIN 0.4 MG CAPSULE: Performed by: ORTHOPAEDIC SURGERY

## 2021-04-26 PROCEDURE — A9270 NON-COVERED ITEM OR SERVICE: HCPCS | Performed by: ORTHOPAEDIC SURGERY

## 2021-04-26 PROCEDURE — 97110 THERAPEUTIC EXERCISES: CPT

## 2021-04-26 PROCEDURE — 97162 PT EVAL MOD COMPLEX 30 MIN: CPT

## 2021-04-26 PROCEDURE — 25010000002 NEOSTIGMINE 5 MG/10ML SOLUTION: Performed by: NURSE ANESTHETIST, CERTIFIED REGISTERED

## 2021-04-26 PROCEDURE — 25010000002 SUCCINYLCHOLINE PER 20 MG: Performed by: NURSE ANESTHETIST, CERTIFIED REGISTERED

## 2021-04-26 PROCEDURE — 25010000002 FENTANYL CITRATE (PF) 100 MCG/2ML SOLUTION: Performed by: NURSE ANESTHETIST, CERTIFIED REGISTERED

## 2021-04-26 PROCEDURE — 25010000002 VANCOMYCIN 10 G RECONSTITUTED SOLUTION: Performed by: ORTHOPAEDIC SURGERY

## 2021-04-26 PROCEDURE — 73560 X-RAY EXAM OF KNEE 1 OR 2: CPT

## 2021-04-26 PROCEDURE — C9290 INJ, BUPIVACAINE LIPOSOME: HCPCS | Performed by: ORTHOPAEDIC SURGERY

## 2021-04-26 PROCEDURE — 25010000002 PHENYLEPHRINE PER 1 ML: Performed by: NURSE ANESTHETIST, CERTIFIED REGISTERED

## 2021-04-26 PROCEDURE — 99222 1ST HOSP IP/OBS MODERATE 55: CPT | Performed by: INTERNAL MEDICINE

## 2021-04-26 PROCEDURE — 25010000002 KETOROLAC TROMETHAMINE PER 15 MG: Performed by: ORTHOPAEDIC SURGERY

## 2021-04-26 PROCEDURE — 25010000002 DEXAMETHASONE PER 1 MG: Performed by: NURSE ANESTHETIST, CERTIFIED REGISTERED

## 2021-04-26 PROCEDURE — 63710000001 POVIDONE-IODINE 10 % SOLUTION 30 ML BOTTLE: Performed by: ORTHOPAEDIC SURGERY

## 2021-04-26 PROCEDURE — 82962 GLUCOSE BLOOD TEST: CPT

## 2021-04-26 PROCEDURE — C1713 ANCHOR/SCREW BN/BN,TIS/BN: HCPCS | Performed by: ORTHOPAEDIC SURGERY

## 2021-04-26 PROCEDURE — 25010000002 MIDAZOLAM PER 1 MG: Performed by: ANESTHESIOLOGY

## 2021-04-26 PROCEDURE — 25010000002 HYDROMORPHONE PER 4 MG: Performed by: NURSE ANESTHETIST, CERTIFIED REGISTERED

## 2021-04-26 PROCEDURE — 93005 ELECTROCARDIOGRAM TRACING: CPT | Performed by: INTERNAL MEDICINE

## 2021-04-26 PROCEDURE — 25010000003 BUPIVACAINE LIPOSOME 1.3 % SUSPENSION 20 ML VIAL: Performed by: ORTHOPAEDIC SURGERY

## 2021-04-26 PROCEDURE — C1776 JOINT DEVICE (IMPLANTABLE): HCPCS | Performed by: ORTHOPAEDIC SURGERY

## 2021-04-26 PROCEDURE — 25010000003 CEFAZOLIN IN DEXTROSE 2-4 GM/100ML-% SOLUTION: Performed by: ORTHOPAEDIC SURGERY

## 2021-04-26 PROCEDURE — 63710000001 LISINOPRIL 10 MG TABLET: Performed by: ORTHOPAEDIC SURGERY

## 2021-04-26 PROCEDURE — 63710000001 ASPIRIN EC 325 MG TABLET DELAYED-RELEASE: Performed by: ORTHOPAEDIC SURGERY

## 2021-04-26 PROCEDURE — 63710000001 ACETAMINOPHEN 500 MG TABLET: Performed by: ORTHOPAEDIC SURGERY

## 2021-04-26 PROCEDURE — 63710000001 ATORVASTATIN 20 MG TABLET: Performed by: ORTHOPAEDIC SURGERY

## 2021-04-26 PROCEDURE — 25010000002 PROPOFOL 10 MG/ML EMULSION: Performed by: NURSE ANESTHETIST, CERTIFIED REGISTERED

## 2021-04-26 PROCEDURE — 97161 PT EVAL LOW COMPLEX 20 MIN: CPT

## 2021-04-26 PROCEDURE — 63710000001 OXYCODONE-ACETAMINOPHEN 5-325 MG TABLET: Performed by: ORTHOPAEDIC SURGERY

## 2021-04-26 DEVICE — IMPLANTABLE DEVICE: Type: IMPLANTABLE DEVICE | Site: KNEE | Status: FUNCTIONAL

## 2021-04-26 DEVICE — JOURNEY TIBIAL BASEPLATE NONPOROUS                                    LEFT SIZE 5
Type: IMPLANTABLE DEVICE | Site: KNEE | Status: FUNCTIONAL
Brand: JOURNEY

## 2021-04-26 DEVICE — JOURNEY II BCS FEMORAL OXINIUM                                    LEFT SIZE 6
Type: IMPLANTABLE DEVICE | Site: KNEE | Status: FUNCTIONAL
Brand: JOURNEY

## 2021-04-26 DEVICE — JOURNEY 7.5 ROUND RESURF PAT 35MM STANDARD
Type: IMPLANTABLE DEVICE | Site: KNEE | Status: FUNCTIONAL
Brand: JOURNEY

## 2021-04-26 DEVICE — PALACOS® R IS A FAST-CURING, RADIOPAQUE, POLY(METHYL METHACRYLATE)-BASED BONE CEMENT.PALACOS ® R CONTAINS THE X-RAY CONTRAST MEDIUM ZIRCONIUM DIOXIDE. TO IMPROVE VISIBILITY IN THE SURGICAL FIELD PALACOS ® R HAS BEEN COLOURED WITH CHLOROPHYLL (E141). THE BONE CEMENT IS PREPARED DIRECTLY BEFORE USE BY MIXING A POLYMER POWDER COMPONENT WITH A LIQUID MONOMER COMPONENT. A DUCTILE DOUGH FORMS WHICH CURES WITHIN A FEW MINUTES.
Type: IMPLANTABLE DEVICE | Site: KNEE | Status: FUNCTIONAL
Brand: PALACOS®

## 2021-04-26 DEVICE — JOURNEY II BCS XLPE ARTICULAR                                    INSERT SIZE 5-6 LEFT 15MM
Type: IMPLANTABLE DEVICE | Site: KNEE | Status: FUNCTIONAL
Brand: JOURNEY

## 2021-04-26 RX ORDER — LABETALOL HYDROCHLORIDE 5 MG/ML
5 INJECTION, SOLUTION INTRAVENOUS
Status: DISCONTINUED | OUTPATIENT
Start: 2021-04-26 | End: 2021-04-26 | Stop reason: HOSPADM

## 2021-04-26 RX ORDER — DIPHENHYDRAMINE HCL 25 MG
25 CAPSULE ORAL
Status: DISCONTINUED | OUTPATIENT
Start: 2021-04-26 | End: 2021-04-26 | Stop reason: HOSPADM

## 2021-04-26 RX ORDER — ONDANSETRON 2 MG/ML
4 INJECTION INTRAMUSCULAR; INTRAVENOUS EVERY 6 HOURS PRN
Status: DISCONTINUED | OUTPATIENT
Start: 2021-04-26 | End: 2021-04-27 | Stop reason: HOSPADM

## 2021-04-26 RX ORDER — LISINOPRIL 10 MG/1
10 TABLET ORAL DAILY
Status: DISCONTINUED | OUTPATIENT
Start: 2021-04-26 | End: 2021-04-27 | Stop reason: HOSPADM

## 2021-04-26 RX ORDER — FENTANYL CITRATE 50 UG/ML
50 INJECTION, SOLUTION INTRAMUSCULAR; INTRAVENOUS
Status: DISCONTINUED | OUTPATIENT
Start: 2021-04-26 | End: 2021-04-26 | Stop reason: HOSPADM

## 2021-04-26 RX ORDER — ACETAMINOPHEN 500 MG
1000 TABLET ORAL ONCE
Status: COMPLETED | OUTPATIENT
Start: 2021-04-26 | End: 2021-04-26

## 2021-04-26 RX ORDER — ACETAMINOPHEN 325 MG/1
325 TABLET ORAL EVERY 4 HOURS PRN
Status: DISCONTINUED | OUTPATIENT
Start: 2021-04-26 | End: 2021-04-27 | Stop reason: HOSPADM

## 2021-04-26 RX ORDER — MIDAZOLAM HYDROCHLORIDE 1 MG/ML
1 INJECTION INTRAMUSCULAR; INTRAVENOUS
Status: DISCONTINUED | OUTPATIENT
Start: 2021-04-26 | End: 2021-04-26 | Stop reason: HOSPADM

## 2021-04-26 RX ORDER — LIDOCAINE HYDROCHLORIDE 10 MG/ML
0.5 INJECTION, SOLUTION EPIDURAL; INFILTRATION; INTRACAUDAL; PERINEURAL ONCE AS NEEDED
Status: DISCONTINUED | OUTPATIENT
Start: 2021-04-26 | End: 2021-04-26 | Stop reason: HOSPADM

## 2021-04-26 RX ORDER — LIDOCAINE HYDROCHLORIDE 20 MG/ML
INJECTION, SOLUTION INFILTRATION; PERINEURAL AS NEEDED
Status: DISCONTINUED | OUTPATIENT
Start: 2021-04-26 | End: 2021-04-26 | Stop reason: SURG

## 2021-04-26 RX ORDER — BISOPROLOL FUMARATE 5 MG/1
5 TABLET, FILM COATED ORAL DAILY
Status: DISCONTINUED | OUTPATIENT
Start: 2021-04-27 | End: 2021-04-27 | Stop reason: HOSPADM

## 2021-04-26 RX ORDER — FLECAINIDE ACETATE 50 MG/1
50 TABLET ORAL 2 TIMES DAILY
Status: DISCONTINUED | OUTPATIENT
Start: 2021-04-26 | End: 2021-04-27 | Stop reason: HOSPADM

## 2021-04-26 RX ORDER — DOCUSATE SODIUM 100 MG/1
100 CAPSULE, LIQUID FILLED ORAL 2 TIMES DAILY PRN
Status: DISCONTINUED | OUTPATIENT
Start: 2021-04-26 | End: 2021-04-27 | Stop reason: HOSPADM

## 2021-04-26 RX ORDER — ROCURONIUM BROMIDE 10 MG/ML
INJECTION, SOLUTION INTRAVENOUS AS NEEDED
Status: DISCONTINUED | OUTPATIENT
Start: 2021-04-26 | End: 2021-04-26 | Stop reason: SURG

## 2021-04-26 RX ORDER — SODIUM CHLORIDE 0.9 % (FLUSH) 0.9 %
3-10 SYRINGE (ML) INJECTION AS NEEDED
Status: DISCONTINUED | OUTPATIENT
Start: 2021-04-26 | End: 2021-04-26 | Stop reason: HOSPADM

## 2021-04-26 RX ORDER — SUCCINYLCHOLINE CHLORIDE 20 MG/ML
INJECTION INTRAMUSCULAR; INTRAVENOUS AS NEEDED
Status: DISCONTINUED | OUTPATIENT
Start: 2021-04-26 | End: 2021-04-26 | Stop reason: SURG

## 2021-04-26 RX ORDER — NEOSTIGMINE METHYLSULFATE 0.5 MG/ML
INJECTION, SOLUTION INTRAVENOUS AS NEEDED
Status: DISCONTINUED | OUTPATIENT
Start: 2021-04-26 | End: 2021-04-26 | Stop reason: SURG

## 2021-04-26 RX ORDER — SODIUM CHLORIDE 0.9 % (FLUSH) 0.9 %
3 SYRINGE (ML) INJECTION EVERY 12 HOURS SCHEDULED
Status: DISCONTINUED | OUTPATIENT
Start: 2021-04-26 | End: 2021-04-26 | Stop reason: HOSPADM

## 2021-04-26 RX ORDER — FAMOTIDINE 10 MG/ML
20 INJECTION, SOLUTION INTRAVENOUS ONCE
Status: COMPLETED | OUTPATIENT
Start: 2021-04-26 | End: 2021-04-26

## 2021-04-26 RX ORDER — FENTANYL CITRATE 50 UG/ML
INJECTION, SOLUTION INTRAMUSCULAR; INTRAVENOUS AS NEEDED
Status: DISCONTINUED | OUTPATIENT
Start: 2021-04-26 | End: 2021-04-26 | Stop reason: SURG

## 2021-04-26 RX ORDER — ONDANSETRON 4 MG/1
4 TABLET, FILM COATED ORAL EVERY 6 HOURS PRN
Status: DISCONTINUED | OUTPATIENT
Start: 2021-04-26 | End: 2021-04-27 | Stop reason: HOSPADM

## 2021-04-26 RX ORDER — NALOXONE HCL 0.4 MG/ML
0.1 VIAL (ML) INJECTION
Status: DISCONTINUED | OUTPATIENT
Start: 2021-04-26 | End: 2021-04-27 | Stop reason: HOSPADM

## 2021-04-26 RX ORDER — HYDROMORPHONE HYDROCHLORIDE 1 MG/ML
0.25 INJECTION, SOLUTION INTRAMUSCULAR; INTRAVENOUS; SUBCUTANEOUS
Status: DISCONTINUED | OUTPATIENT
Start: 2021-04-26 | End: 2021-04-26 | Stop reason: HOSPADM

## 2021-04-26 RX ORDER — OXYCODONE HYDROCHLORIDE AND ACETAMINOPHEN 5; 325 MG/1; MG/1
2 TABLET ORAL EVERY 4 HOURS PRN
Status: DISCONTINUED | OUTPATIENT
Start: 2021-04-26 | End: 2021-04-27 | Stop reason: HOSPADM

## 2021-04-26 RX ORDER — DEXAMETHASONE SODIUM PHOSPHATE 10 MG/ML
INJECTION INTRAMUSCULAR; INTRAVENOUS AS NEEDED
Status: DISCONTINUED | OUTPATIENT
Start: 2021-04-26 | End: 2021-04-26 | Stop reason: SURG

## 2021-04-26 RX ORDER — ONDANSETRON 2 MG/ML
4 INJECTION INTRAMUSCULAR; INTRAVENOUS ONCE AS NEEDED
Status: DISCONTINUED | OUTPATIENT
Start: 2021-04-26 | End: 2021-04-26 | Stop reason: HOSPADM

## 2021-04-26 RX ORDER — SODIUM CHLORIDE, SODIUM LACTATE, POTASSIUM CHLORIDE, CALCIUM CHLORIDE 600; 310; 30; 20 MG/100ML; MG/100ML; MG/100ML; MG/100ML
100 INJECTION, SOLUTION INTRAVENOUS CONTINUOUS
Status: DISCONTINUED | OUTPATIENT
Start: 2021-04-26 | End: 2021-04-27 | Stop reason: HOSPADM

## 2021-04-26 RX ORDER — ATORVASTATIN CALCIUM 20 MG/1
20 TABLET, FILM COATED ORAL DAILY
Status: DISCONTINUED | OUTPATIENT
Start: 2021-04-26 | End: 2021-04-27 | Stop reason: HOSPADM

## 2021-04-26 RX ORDER — SODIUM CHLORIDE 0.9 % (FLUSH) 0.9 %
1-10 SYRINGE (ML) INJECTION AS NEEDED
Status: DISCONTINUED | OUTPATIENT
Start: 2021-04-26 | End: 2021-04-27 | Stop reason: HOSPADM

## 2021-04-26 RX ORDER — KETOROLAC TROMETHAMINE 30 MG/ML
15 INJECTION, SOLUTION INTRAMUSCULAR; INTRAVENOUS EVERY 6 HOURS PRN
Status: DISCONTINUED | OUTPATIENT
Start: 2021-04-26 | End: 2021-04-27 | Stop reason: HOSPADM

## 2021-04-26 RX ORDER — ACETAMINOPHEN 325 MG/1
650 TABLET ORAL EVERY 4 HOURS PRN
Status: DISCONTINUED | OUTPATIENT
Start: 2021-04-26 | End: 2021-04-27 | Stop reason: HOSPADM

## 2021-04-26 RX ORDER — MAGNESIUM HYDROXIDE 1200 MG/15ML
LIQUID ORAL AS NEEDED
Status: DISCONTINUED | OUTPATIENT
Start: 2021-04-26 | End: 2021-04-26 | Stop reason: HOSPADM

## 2021-04-26 RX ORDER — MIDAZOLAM HYDROCHLORIDE 1 MG/ML
0.5 INJECTION INTRAMUSCULAR; INTRAVENOUS
Status: DISCONTINUED | OUTPATIENT
Start: 2021-04-26 | End: 2021-04-26 | Stop reason: HOSPADM

## 2021-04-26 RX ORDER — PROPOFOL 10 MG/ML
VIAL (ML) INTRAVENOUS AS NEEDED
Status: DISCONTINUED | OUTPATIENT
Start: 2021-04-26 | End: 2021-04-26 | Stop reason: SURG

## 2021-04-26 RX ORDER — ALBUTEROL SULFATE 2.5 MG/3ML
2.5 SOLUTION RESPIRATORY (INHALATION) ONCE AS NEEDED
Status: DISCONTINUED | OUTPATIENT
Start: 2021-04-26 | End: 2021-04-26 | Stop reason: HOSPADM

## 2021-04-26 RX ORDER — PROMETHAZINE HYDROCHLORIDE 25 MG/1
25 TABLET ORAL ONCE AS NEEDED
Status: DISCONTINUED | OUTPATIENT
Start: 2021-04-26 | End: 2021-04-26 | Stop reason: HOSPADM

## 2021-04-26 RX ORDER — NALOXONE HCL 0.4 MG/ML
0.2 VIAL (ML) INJECTION AS NEEDED
Status: DISCONTINUED | OUTPATIENT
Start: 2021-04-26 | End: 2021-04-26 | Stop reason: HOSPADM

## 2021-04-26 RX ORDER — GLYCOPYRROLATE 0.2 MG/ML
INJECTION INTRAMUSCULAR; INTRAVENOUS AS NEEDED
Status: DISCONTINUED | OUTPATIENT
Start: 2021-04-26 | End: 2021-04-26 | Stop reason: SURG

## 2021-04-26 RX ORDER — FLUMAZENIL 0.1 MG/ML
0.2 INJECTION INTRAVENOUS AS NEEDED
Status: DISCONTINUED | OUTPATIENT
Start: 2021-04-26 | End: 2021-04-26 | Stop reason: HOSPADM

## 2021-04-26 RX ORDER — SODIUM CHLORIDE 0.9 % (FLUSH) 0.9 %
3 SYRINGE (ML) INJECTION EVERY 12 HOURS SCHEDULED
Status: DISCONTINUED | OUTPATIENT
Start: 2021-04-26 | End: 2021-04-27 | Stop reason: HOSPADM

## 2021-04-26 RX ORDER — SODIUM CHLORIDE, SODIUM LACTATE, POTASSIUM CHLORIDE, CALCIUM CHLORIDE 600; 310; 30; 20 MG/100ML; MG/100ML; MG/100ML; MG/100ML
9 INJECTION, SOLUTION INTRAVENOUS CONTINUOUS
Status: DISCONTINUED | OUTPATIENT
Start: 2021-04-26 | End: 2021-04-27 | Stop reason: HOSPADM

## 2021-04-26 RX ORDER — OXYCODONE HYDROCHLORIDE AND ACETAMINOPHEN 5; 325 MG/1; MG/1
1 TABLET ORAL EVERY 4 HOURS PRN
Status: DISCONTINUED | OUTPATIENT
Start: 2021-04-26 | End: 2021-04-27 | Stop reason: HOSPADM

## 2021-04-26 RX ORDER — HYDRALAZINE HYDROCHLORIDE 20 MG/ML
5 INJECTION INTRAMUSCULAR; INTRAVENOUS
Status: DISCONTINUED | OUTPATIENT
Start: 2021-04-26 | End: 2021-04-26 | Stop reason: HOSPADM

## 2021-04-26 RX ORDER — ACETAMINOPHEN 650 MG/1
650 SUPPOSITORY RECTAL EVERY 4 HOURS PRN
Status: DISCONTINUED | OUTPATIENT
Start: 2021-04-26 | End: 2021-04-27 | Stop reason: HOSPADM

## 2021-04-26 RX ORDER — DIPHENHYDRAMINE HYDROCHLORIDE 50 MG/ML
12.5 INJECTION INTRAMUSCULAR; INTRAVENOUS
Status: DISCONTINUED | OUTPATIENT
Start: 2021-04-26 | End: 2021-04-26 | Stop reason: HOSPADM

## 2021-04-26 RX ORDER — HYDROCODONE BITARTRATE AND ACETAMINOPHEN 7.5; 325 MG/1; MG/1
1 TABLET ORAL ONCE AS NEEDED
Status: DISCONTINUED | OUTPATIENT
Start: 2021-04-26 | End: 2021-04-26 | Stop reason: HOSPADM

## 2021-04-26 RX ORDER — FERROUS SULFATE 325(65) MG
325 TABLET ORAL
Status: DISCONTINUED | OUTPATIENT
Start: 2021-04-27 | End: 2021-04-27 | Stop reason: HOSPADM

## 2021-04-26 RX ORDER — ASPIRIN 325 MG
325 TABLET, DELAYED RELEASE (ENTERIC COATED) ORAL DAILY
Status: DISCONTINUED | OUTPATIENT
Start: 2021-04-26 | End: 2021-04-27 | Stop reason: HOSPADM

## 2021-04-26 RX ORDER — TRANEXAMIC ACID 100 MG/ML
INJECTION, SOLUTION INTRAVENOUS AS NEEDED
Status: DISCONTINUED | OUTPATIENT
Start: 2021-04-26 | End: 2021-04-26 | Stop reason: SURG

## 2021-04-26 RX ORDER — TAMSULOSIN HYDROCHLORIDE 0.4 MG/1
0.4 CAPSULE ORAL NIGHTLY
Status: DISCONTINUED | OUTPATIENT
Start: 2021-04-26 | End: 2021-04-27 | Stop reason: HOSPADM

## 2021-04-26 RX ORDER — EPHEDRINE SULFATE 50 MG/ML
5 INJECTION, SOLUTION INTRAVENOUS ONCE AS NEEDED
Status: DISCONTINUED | OUTPATIENT
Start: 2021-04-26 | End: 2021-04-26 | Stop reason: HOSPADM

## 2021-04-26 RX ORDER — OXYCODONE AND ACETAMINOPHEN 7.5; 325 MG/1; MG/1
1 TABLET ORAL ONCE AS NEEDED
Status: DISCONTINUED | OUTPATIENT
Start: 2021-04-26 | End: 2021-04-26 | Stop reason: HOSPADM

## 2021-04-26 RX ORDER — PROMETHAZINE HYDROCHLORIDE 25 MG/1
25 SUPPOSITORY RECTAL ONCE AS NEEDED
Status: DISCONTINUED | OUTPATIENT
Start: 2021-04-26 | End: 2021-04-26 | Stop reason: HOSPADM

## 2021-04-26 RX ORDER — CEFAZOLIN SODIUM 2 G/100ML
2 INJECTION, SOLUTION INTRAVENOUS EVERY 8 HOURS
Status: COMPLETED | OUTPATIENT
Start: 2021-04-26 | End: 2021-04-27

## 2021-04-26 RX ADMIN — FENTANYL CITRATE 25 MCG: 50 INJECTION INTRAMUSCULAR; INTRAVENOUS at 10:12

## 2021-04-26 RX ADMIN — HYDROMORPHONE HYDROCHLORIDE 0.25 MG: 1 INJECTION, SOLUTION INTRAMUSCULAR; INTRAVENOUS; SUBCUTANEOUS at 12:30

## 2021-04-26 RX ADMIN — OXYCODONE HYDROCHLORIDE AND ACETAMINOPHEN 1 TABLET: 5; 325 TABLET ORAL at 19:21

## 2021-04-26 RX ADMIN — LISINOPRIL 10 MG: 10 TABLET ORAL at 17:36

## 2021-04-26 RX ADMIN — SODIUM CHLORIDE, POTASSIUM CHLORIDE, SODIUM LACTATE AND CALCIUM CHLORIDE 100 ML/HR: 600; 310; 30; 20 INJECTION, SOLUTION INTRAVENOUS at 15:30

## 2021-04-26 RX ADMIN — TRANEXAMIC ACID 1000 MG: 1 INJECTION, SOLUTION INTRAVENOUS at 11:07

## 2021-04-26 RX ADMIN — NEOSTIGMINE METHYLSULFATE 2 MG: 0.5 INJECTION INTRAVENOUS at 11:17

## 2021-04-26 RX ADMIN — PHENYLEPHRINE HYDROCHLORIDE 100 MCG: 10 INJECTION INTRAVENOUS at 11:16

## 2021-04-26 RX ADMIN — HYDROMORPHONE HYDROCHLORIDE 0.25 MG: 1 INJECTION, SOLUTION INTRAMUSCULAR; INTRAVENOUS; SUBCUTANEOUS at 11:59

## 2021-04-26 RX ADMIN — SODIUM CHLORIDE, POTASSIUM CHLORIDE, SODIUM LACTATE AND CALCIUM CHLORIDE: 600; 310; 30; 20 INJECTION, SOLUTION INTRAVENOUS at 09:36

## 2021-04-26 RX ADMIN — FENTANYL CITRATE 50 MCG: 50 INJECTION INTRAMUSCULAR; INTRAVENOUS at 09:38

## 2021-04-26 RX ADMIN — VANCOMYCIN HYDROCHLORIDE 2000 MG: 10 INJECTION, POWDER, LYOPHILIZED, FOR SOLUTION INTRAVENOUS at 08:08

## 2021-04-26 RX ADMIN — TAMSULOSIN HYDROCHLORIDE 0.4 MG: 0.4 CAPSULE ORAL at 17:39

## 2021-04-26 RX ADMIN — PHENYLEPHRINE HYDROCHLORIDE 100 MCG: 10 INJECTION INTRAVENOUS at 11:13

## 2021-04-26 RX ADMIN — ATORVASTATIN CALCIUM 20 MG: 20 TABLET, FILM COATED ORAL at 17:40

## 2021-04-26 RX ADMIN — ROCURONIUM BROMIDE 45 MG: 50 INJECTION INTRAVENOUS at 09:50

## 2021-04-26 RX ADMIN — GLYCOPYRROLATE 0.2 MG: 0.2 INJECTION INTRAMUSCULAR; INTRAVENOUS at 09:38

## 2021-04-26 RX ADMIN — GLYCOPYRROLATE 0.4 MG: 0.2 INJECTION INTRAMUSCULAR; INTRAVENOUS at 11:11

## 2021-04-26 RX ADMIN — CEFAZOLIN SODIUM 2 G: 2 INJECTION, SOLUTION INTRAVENOUS at 17:36

## 2021-04-26 RX ADMIN — FENTANYL CITRATE 25 MCG: 50 INJECTION INTRAMUSCULAR; INTRAVENOUS at 10:35

## 2021-04-26 RX ADMIN — PHENYLEPHRINE HYDROCHLORIDE 100 MCG: 10 INJECTION INTRAVENOUS at 11:26

## 2021-04-26 RX ADMIN — MIDAZOLAM 0.5 MG: 1 INJECTION INTRAMUSCULAR; INTRAVENOUS at 08:41

## 2021-04-26 RX ADMIN — PROPOFOL 180 MG: 10 INJECTION, EMULSION INTRAVENOUS at 09:40

## 2021-04-26 RX ADMIN — ROCURONIUM BROMIDE 5 MG: 50 INJECTION INTRAVENOUS at 09:40

## 2021-04-26 RX ADMIN — FLECAINIDE ACETATE TABLET 50 MG: 50 TABLET ORAL at 20:26

## 2021-04-26 RX ADMIN — SUCCINYLCHOLINE CHLORIDE 140 MG: 20 INJECTION, SOLUTION INTRAMUSCULAR; INTRAVENOUS; PARENTERAL at 09:40

## 2021-04-26 RX ADMIN — ACETAMINOPHEN 1000 MG: 500 TABLET, FILM COATED ORAL at 07:55

## 2021-04-26 RX ADMIN — KETOROLAC TROMETHAMINE 15 MG: 30 INJECTION, SOLUTION INTRAMUSCULAR at 12:20

## 2021-04-26 RX ADMIN — HYDROMORPHONE HYDROCHLORIDE 0.25 MG: 1 INJECTION, SOLUTION INTRAMUSCULAR; INTRAVENOUS; SUBCUTANEOUS at 12:04

## 2021-04-26 RX ADMIN — SODIUM CHLORIDE, PRESERVATIVE FREE 3 ML: 5 INJECTION INTRAVENOUS at 20:26

## 2021-04-26 RX ADMIN — FAMOTIDINE 20 MG: 10 INJECTION INTRAVENOUS at 08:41

## 2021-04-26 RX ADMIN — FENTANYL CITRATE 50 MCG: 50 INJECTION INTRAMUSCULAR; INTRAVENOUS at 11:59

## 2021-04-26 RX ADMIN — HYDROMORPHONE HYDROCHLORIDE 0.25 MG: 1 INJECTION, SOLUTION INTRAMUSCULAR; INTRAVENOUS; SUBCUTANEOUS at 12:25

## 2021-04-26 RX ADMIN — FENTANYL CITRATE 50 MCG: 50 INJECTION INTRAMUSCULAR; INTRAVENOUS at 12:15

## 2021-04-26 RX ADMIN — ASPIRIN 325 MG: 325 TABLET, COATED ORAL at 17:36

## 2021-04-26 RX ADMIN — LIDOCAINE HYDROCHLORIDE 80 MG: 20 INJECTION, SOLUTION INFILTRATION; PERINEURAL at 09:40

## 2021-04-26 RX ADMIN — DEXAMETHASONE SODIUM PHOSPHATE 8 MG: 10 INJECTION INTRAMUSCULAR; INTRAVENOUS at 10:03

## 2021-04-26 NOTE — THERAPY EVALUATION
Patient Name: Berhane Chaidez  : 1950    MRN: [id number]                              Today's Date: 2021       Admit Date: 2021    Visit Dx: No diagnosis found.  Patient Active Problem List   Diagnosis   • History of colon polyps   • Prediabetes   • ED (erectile dysfunction) of non-organic origin   • Hyperlipidemia   • Hypogonadism in male   • BPH (benign prostatic hyperplasia)   • Osteoarthritis of both hips   • Routine health maintenance   • History of diverticulitis   • Osteoarthritis, knee   • Supraventricular tachycardia (CMS/HCC)   • History of basal cell carcinoma   • Onychomycosis   • Adenomatous polyp   • Infected sebaceous cyst of skin   • DJD (degenerative joint disease) of knee   • DJD (degenerative joint disease)     Past Medical History:   Diagnosis Date   • Arthritis     OSTEO fingers, knees   • COVID-19 vaccine series completed     2ND DOSE 2021   • Diverticular disease    • ED (erectile dysfunction)    • Elevated cholesterol    • H/O atrial tachycardia     Dr Morgan follows, loop recorder in place   • History of kidney stones    • History of PSVT (paroxysmal supraventricular tachycardia)    • History of transfusion    • Hyperlipidemia    • Hypertension    • Low testosterone    • MRSA infection     h/o left hand years ago, no issues since, Dr Donohue derm treated   • Sleep apnea     Cpap   • Status post placement of implantable loop recorder     annual, Dr Morgan follows     Past Surgical History:   Procedure Laterality Date   • CARPAL TUNNEL RELEASE Right    • CATARACT EXTRACTION Bilateral    • CATARACT EXTRACTION      LEFT AND RIGHT   • COLONOSCOPY     • COLONOSCOPY N/A 3/6/2019    Procedure: COLONOSCOPY TO CECUM AND TI WITH HOT AND COLD POLYPECTOMIES;  Surgeon: Ana Dai MD;  Location: [facility] ENDOSCOPY;  Service: Gastroenterology   • CYST REMOVAL     • EXCISION MASS TRUNK N/A 3/11/2020    Procedure: TRUNK LESION/CYST EXCISION, x 2 back;  Surgeon: Gill Maldonado  MD OZ;  Location: [facility] OR;  Service: General;  Laterality: N/A;   • INCARCERATED HERNIA REPAIR      History of Incarcerated Umbilical Hernia Repair for Person Over Age 5    • OTHER SURGICAL HISTORY      LOOP RECORDER PLACEMENT   • SD TOTAL KNEE ARTHROPLASTY Right 5/22/2017    Procedure: RT TOTAL KNEE ARTHROPLASTY;  Surgeon: Rober Andrews MD;  Location: [facility] MAIN OR;  Service: Orthopedics   • TONSILLECTOMY     • TOTAL HIP ARTHROPLASTY      LEFT 2003 RIGHT 2013   • TOTAL SHOULDER ARTHROPLASTY      RIGHT 2012 AND LEFT 2013   • VASECTOMY     • WISDOM TOOTH EXTRACTION       General Information     Row Name 04/26/21 1620          Physical Therapy Time and Intention    Document Type  evaluation  -CF     Mode of Treatment  individual therapy;physical therapy  -CF     Row Name 04/26/21 1620          General Information    Patient Profile Reviewed  yes  -CF     Prior Level of Function  independent:  -CF     Existing Precautions/Restrictions  fall  -CF     Barriers to Rehab  none identified  -     Row Name 04/26/21 1620          Living Environment    Lives With  spouse  -     Row Name 04/26/21 1620          Home Main Entrance    Number of Stairs, Main Entrance  other (see comments) 15  -CF     Row Name 04/26/21 1620          Stairs Within Home, Primary    Number of Stairs, Within Home, Primary  none  -CF     Row Name 04/26/21 1620          Cognition    Orientation Status (Cognition)  oriented x 4  -CF     Row Name 04/26/21 1620          Safety Issues, Functional Mobility    Impairments Affecting Function (Mobility)  endurance/activity tolerance;range of motion (ROM);strength  -CF       User Key  (r) = Recorded By, (t) = Taken By, (c) = Cosigned By    Initials Name Provider Type    CF Hilda Castillo PT Physical Therapist        Mobility     Row Name 04/26/21 1620          Bed Mobility    Bed Mobility  supine-sit  -CF     Supine-Sit [place] (Bed Mobility)  standby assist  -     Row Name 04/26/21 1620           Sit-Stand Transfer    Sit-Stand [place] (Transfers)  contact guard  -     Assistive Device (Sit-Stand Transfers)  walker, front-wheeled  -CF     Row Name 04/26/21 1620          Gait/Stairs (Locomotion)    [place] Level (Gait)  contact guard;verbal cues  -CF     Assistive Device (Gait)  walker, front-wheeled  -CF     Distance in Feet (Gait)  15'  -CF     Deviations/Abnormal Patterns (Gait)  kathy decreased;gait speed decreased;antalgic  -CF     Bilateral Gait Deviations  forward flexed posture  -CF     Left Sided Gait Deviations  weight shift ability decreased  -CF     Comment (Gait/Stairs)  Cues for upright posture, distance limited for cardiac monitoring as pt with tachy and irregular HR in surgery recovery  -     Row Name 04/26/21 1620          Mobility    Extremity Weight-bearing Status  left lower extremity  -CF     Left Lower Extremity (Weight-bearing Status)  weight-bearing as tolerated (WBAT)  -       User Key  (r) = Recorded By, (t) = Taken By, (c) = Cosigned By    Initials Name Provider Type     Hilda Castillo, JONATHAN Physical Therapist        Obj/Interventions     Row Name 04/26/21 1621          Range of Motion Comprehensive    Comment, General Range of Motion  L knee approx 0-90  -[facility] Name 04/26/21 1621          Strength Comprehensive (MMT)    Comment, General Manual Muscle Testing (MMT) Assessment  BLE WFL, generalized weakness noted post op  -     Row Name 04/26/21 1621          Motor Skills    Therapeutic Exercise  other (see comments) tka exercises x10 reps  -[facility] Name 04/26/21 1621          Sensory Assessment (Somatosensory)    Sensory Assessment (Somatosensory)  LE sensation intact  -       User Key  (r) = Recorded By, (t) = Taken By, (c) = Cosigned By    Initials Name Provider Type     Hilda Castillo, JONATHAN Physical Therapist        Goals/Plan     Row Name 04/26/21 1625          Bed Mobility Goal 1 (PT)    Activity/Assistive Device (Bed Mobility Goal 1, PT)   bed mobility activities, all  -CF     [place] Level/Cues Needed (Bed Mobility Goal 1, PT)  modified independence  -CF     Time Frame (Bed Mobility Goal 1, PT)  5 days  -CF     Row Name 04/26/21 1625          Transfer Goal 1 (PT)    Activity/Assistive Device (Transfer Goal 1, PT)  sit-to-stand/stand-to-sit;bed-to-chair/chair-to-bed;walker, rolling  -CF     [place] Level/Cues Needed (Transfer Goal 1, PT)  modified independence  -CF     Time Frame (Transfer Goal 1, PT)  5 days  -CF     Row Name 04/26/21 1625          Gait Training Goal 1 (PT)    Activity/Assistive Device (Gait Training Goal 1, PT)  gait (walking locomotion)  -CF     [place] Level (Gait Training Goal 1, PT)  standby assist  -CF     Distance (Gait Training Goal 1, PT)  100'  -CF     Time Frame (Gait Training Goal 1, PT)  5 days  -CF     Row Name 04/26/21 1625          ROM Goal 1 (PT)    ROM Goal 1 (PT)  L knee 0-90  -CF     Time Frame (ROM Goal 1, PT)  5 days  -CF     Row Name 04/26/21 1625          Stairs Goal 1 (PT)    Activity/Assistive Device (Stairs Goal 1, PT)  ascending stairs;descending stairs  -CF     [place] Level/Cues Needed (Stairs Goal 1, PT)  contact guard assist  -CF     Number of Stairs (Stairs Goal 1, PT)  15  -CF     Time Frame (Stairs Goal 1, PT)  5 days  -CF       User Key  (r) = Recorded By, (t) = Taken By, (c) = Cosigned By    Initials Name Provider Type    CF Hilda Castillo, PT Physical Therapist        Clinical Impression     Row Name 04/26/21 1622          Pain    Additional Documentation  Pain Scale: Numbers Pre/Post-Treatment (Group)  -CF     [facility] Name 04/26/21 1622          Pain Scale: Numbers Pre/Post-Treatment    Pretreatment Pain Rating  2/10  -CF     Posttreatment Pain Rating  2/10  -CF     Pain Location - Side  Left  -CF     Pain Location  knee  -CF     Pain Intervention(s)  Medication (See MAR);Repositioned;Ambulation/increased activity;Rest;Elevated  -CF     [facility] Name 04/26/21 1622          Plan of  Care Review    Plan of Care Reviewed With  patient  -CF     Outcome Summary  Pt is POD 0 L TKA. Pt had episode of irregular tachycardia in recovery and sent to main for telemetry monitoring. Pt reports IND at baseline, has 15 steps to enter from basement, and lives with supportive wife. Upon PT evaluation, pt completed bed mobility with sba, cga for transfers and ambulated 15' with cga using rw. Pt demonstrates expected post op pain, weakness, decreased ROM, and decreased activity tolerance. Vitals monitored and stable throughout evaluation. Anticipate dc home with assist and HH.  -CF     Row Name 04/26/21 1622          Therapy Assessment/Plan (PT)    Rehab Potential (PT)  good, to achieve stated therapy goals  -CF     Criteria for Skilled Interventions Met (PT)  yes  -CF     Predicted Duration of Therapy Intervention (PT)  5 days  -CF     Row Name 04/26/21 1622          Vital Signs    Pretreatment Heart Rate (beats/min)  68  -CF     Intratreatment Heart Rate (beats/min)  88  -CF     Pre SpO2 (%)  95  -CF     O2 Delivery Pre Treatment  room air  -CF     Post SpO2 (%)  94  -CF     O2 Delivery Post Treatment  room air  -CF     Row Name 04/26/21 1622          Positioning and Restraints    Pre-Treatment Position  in bed  -CF     Post Treatment Position  chair  -CF     In Chair  notified nsg;reclined;call light within reach;encouraged to call for assist;LLE elevated  -CF       User Key  (r) = Recorded By, (t) = Taken By, (c) = Cosigned By    Initials Name Provider Type    CF Hilda Castillo, PT Physical Therapist        Outcome Measures     Row Name 04/26/21 8949          How much help from another person do you currently need...    Turning from your back to your side while in flat bed without using bedrails?  3  -CF     Moving from lying on back to sitting on the side of a flat bed without bedrails?  3  -CF     Moving to and from a bed to a chair (including a wheelchair)?  3  -CF     Standing up from a chair using  your arms (e.g., wheelchair, bedside chair)?  3  -CF     Climbing 3-5 steps with a railing?  3  -CF     To walk in hospital room?  3  -CF     AM-PAC 6 Clicks Score (PT)  18  -CF     Row Name 04/26/21 1626          Functional Assessment    Outcome Measure Options  AM-PAC 6 Clicks Basic Mobility (PT)  -CF       User Key  (r) = Recorded By, (t) = Taken By, (c) = Cosigned By    Initials Name Provider Type    CF Hilda Castillo, JONATHAN Physical Therapist        Physical Therapy Education                 Title: PT OT SLP Therapies (Done)     Topic: Physical Therapy (Done)     Point: Mobility training (Done)     Learning Progress Summary           Patient Acceptance, E, VU by CF at 4/26/2021 1626                   Point: Home exercise program (Done)     Learning Progress Summary           Patient Acceptance, E, VU by CF at 4/26/2021 1626                   Point: Body mechanics (Done)     Learning Progress Summary           Patient Acceptance, E, VU by CF at 4/26/2021 1626                   Point: Precautions (Done)     Learning Progress Summary           Patient Acceptance, E, VU by CF at 4/26/2021 1626                               User Key     Initials Effective Dates Name Provider Type Discipline    CF 09/02/20 -  Hilda Castillo, JONATHAN Physical Therapist PT              PT Recommendation and Plan  Planned Therapy Interventions (PT): balance training, bed mobility training, gait training, home exercise program, ROM (range of motion), stair training, strengthening, patient/family education, transfer training  Plan of Care Reviewed With: patient  Outcome Summary: Pt is POD 0 L TKA. Pt had episode of irregular tachycardia in recovery and sent to [facility] for telemetry monitoring. Pt reports IND at baseline, has 15 steps to enter from basement, and lives with supportive wife. Upon PT evaluation, pt completed bed mobility with sba, cga for transfers and ambulated 15' with cga using rw. Pt demonstrates expected post op pain,  weakness, decreased ROM, and decreased activity tolerance. Vitals monitored and stable throughout evaluation. Anticipate dc home with assist and HH.     Time Calculation:   PT Charges     Row Name 04/26/21 1619             Time Calculation    Start Time  1547  -CF      Stop Time  1612  -CF      Time Calculation (min)  25 min  -CF      PT Received On  04/26/21  -CF      PT - Next Appointment  04/27/21  -CF      PT Goal Re-Cert Due Date  04/30/21  -CF         Time Calculation- PT    Total Timed Code Minutes- PT  20 minute(s)  -CF        User Key  (r) = Recorded By, (t) = Taken By, (c) = Cosigned By    Initials Name Provider Type    CF Hilda Castillo, PT Physical Therapist        Therapy Charges for Today     Code Description Service Date Service Provider Modifiers Qty    99265821074 HC PT THER PROC EA 15 MIN 4/26/2021 Hilda Castillo, PT GP 1    53085690022 HC PT EVAL LOW COMPLEXITY 2 4/26/2021 Hilda Castillo, PT GP 1          PT G-Codes  Outcome Measure Options: AM-PAC 6 Clicks Basic Mobility (PT)  AM-PAC 6 Clicks Score (PT): 18    Hilda Castillo PT  4/26/2021

## 2021-04-26 NOTE — OP NOTE
Orthopaedic Surgery   Left Total Knee  Dr. Rober Andrews   [phone]    Patient Name:  Berhane Chaidez  YOB: 1950  Medical Records Number:  [id number]    Date of Procedure:  4/26/2021    Pre-operative Diagnosis:  DJD Left Knee    Post-operative Diagnosis:  DJD Left Knee    Procedure Performed:  Left Total Knee Replacement     Anesthesia: General, supplemented by a periarticular Exparel/bupivacaine injection.      Surgeon: Rober Andrews MD     Assistant: Hillary Morrissey PA-C; note that as part of the surgical procedure, I utilized service of an assistant surgeon, specifically Hillary Morrissey PA-C. Assistant surgeon participated in crucial portion of the operation. Use of the assistant surgeon greatly reduced overall operative time, thus significantly reducing overall morbidity for the patient.      Implants:    Implant Name Type Inv. Item Serial No.  Lot No. LRB No. Used Action   CMT BONE PALACOS R HI/VISC 1X40 - [id number] Implant CMT BONE PALACOS R HI/VISC 1X40  [employer] [id number] Left 1 Implanted   CMT BONE PALACOS R HI/VISC 1X40 - [id number] Implant CMT BONE PALACOS R HI/VISC 1X40  [employer] [id number] Left 1 Implanted   BASEPLT TIB JOURNEY NONPOR SZ5 LT - [id number] Implant BASEPLT TIB JOURNEY NONPOR SZ5 LT  [employer] [id number] Left 1 Implanted   PATELLA RESRF JOURNEY 7.5X35MM RND - [id number] Implant PATELLA RESRF JOURNEY 7.5X35MM RND  [employer] [id number] Left 1 Implanted   COMP FEM JOURNEY2 BCS OXINIUM SZ6 LT - [id number] Implant COMP FEM JOURNEY2 BCS OXINIUM SZ6 LT  [employer] [id number] Left 1 Implanted   INSRT ART/KN JOURNEY2 BCS XLPE SZ5TO6 15MM LT - [id number] Implant INSRT ART/KN JOURNEY2 BCS XLPE SZ5TO6 15MM LT  [employer] [id number] Left 1 Implanted       Implants utilized: I used the [employer] journey system.  I used a size 5 tibial baseplate.  Size 6 BCS femur.    15 mm PS mm  polyethylene insert.    35  patella.    Tourniquet Time: Was 58 minutes.      Medications: Note that we gave 1 g IV tranexamic acid when the cement was mixed.      Estimated Blood Loss:   50 mL    Specimens: * No orders in the log *     Complications: None.      Quality Measures: I have documented the patient's current medications including dosage, frequency, and route of administration in medical record today. Conservative alternatives were discussed with the patient as part of the decision-making process prior to the procedure. The patient was evaluated immediately preoperatively for cardiovascular and thromboembolic risk factors.  There are no acute risk factors.  Prophylactic IV antibiotics were administered before the tourniquet went up.      Description of Procedure: After prep and drape, Left knee was approached via midline longitudinal anterior incision. Medial parapatellar arthrotomy was extended to the vastus medialis obliquus which was split in line with the fibers near the medial border, in keeping with minimally invasive technique. Patella was osteotomized proper depth for the patella implant. [place] holes are created. Patella was subluxed and protected. Intramedullary instrumentation was used on each side of the joint; careful and thorough canal content irrigation. I cut the femur 10 mm depth, 5° valgus controlling rotation. The femur  was sized appropriatelyt. Anterior, posterior, and chamfer cuts were made. Tibia is cut 10 mm depth, 5° of posterior slope. Meniscectomies are completed. Trial reduction is performed. Rotation is marked and keel slot was created.  To achieve proper ligament balance, PCL resection was required.  Standard instrumentation was utilized to resect the intercondylar notch and PCL protecting popliteal neurovascular structures.     Bony surface was thoroughly cleaned and dried. I injected the posterior capsule and medial lateral gutter with Exparel solution containing 20 mL Exparel, 25 mL 0.25% bupivacaine  with epinephrine, 20 mL saline. Care was taken to protect popliteal neurovascular structures and the peroneal nerve. I cemented the tibial baseplate,  Femur, and patella.  Trial reduction revealed a 15 mm PS polyethylene insert best balanced stability and range of motion, and is locked in the tibial tray.      Tourniquet was released; hemostasis obtained. Wound was closed in layers with #1 Vicryl on the capsule, 2-0 Vicryl in subcutaneous tissue, and zipline in the skin over a 1/8-inch drain. Note that I injected my capsule with my Exparel solution during closure.      Rober Andrews MD  4/26/2021  14:44 EDT

## 2021-04-26 NOTE — PLAN OF CARE
Goal Outcome Evaluation:     Progress: no change  Outcome Summary: Patient alert and oriented. Patient denies pain. PT worked with patient. Bp slightly elevated. On room air. Will continue to monitor.

## 2021-04-26 NOTE — ANESTHESIA PROCEDURE NOTES
Airway  Urgency: elective    Date/Time: 4/26/2021 9:44 AM  Airway not difficult    General Information and Staff    Patient location during procedure: OR  Anesthesiologist: Jabier Zhang MD  CRNA: Roberto Conde CRNA    Indications and Patient Condition  Indications for airway management: airway protection    Preoxygenated: yes  MILS maintained throughout  Mask difficulty assessment: 1 - vent by mask    Final Airway Details  Final airway type: endotracheal airway      Successful airway: ETT  Cuffed: yes   Successful intubation technique: direct laryngoscopy  Facilitating devices/methods: cricoid pressure  Endotracheal tube insertion site: oral  Blade: [place]  Blade size: 4  ETT size (mm): 7.5  Cormack-Lehane Classification: grade IIb - view of arytenoids or posterior of glottis only  Placement verified by: chest auscultation and capnometry   Inital cuff pressure (cm H2O): 22  Cuff volume (mL): 8  Measured from: lips  ETT/EBT  to lips (cm): 24  Number of attempts at approach: 1

## 2021-04-26 NOTE — ANESTHESIA POSTPROCEDURE EVALUATION
Patient: Berhane Chaidez    Procedure Summary     Date: 04/26/21 Room / Location: [facility] OR  / [facility] MAIN OR    Anesthesia Start: 0936 Anesthesia Stop: 1139    Procedure: TOTAL KNEE ARTHROPLASTY (Left Knee) Diagnosis:     Surgeons: Rober Andrews MD Provider: Jabier Zhang MD    Anesthesia Type: general ASA Status: 3          Anesthesia Type: general    Vitals  Vitals Value Taken Time   /80 04/26/21 1430   Temp 36.6 °C (97.9 °F) 04/26/21 1137   Pulse 66 04/26/21 1433   Resp 16 04/26/21 1400   SpO2 95 % 04/26/21 1433   Vitals shown include unvalidated device data.        Anesthesia Post Evaluation

## 2021-04-26 NOTE — PLAN OF CARE
Goal Outcome Evaluation:  Plan of Care Reviewed With: patient     Outcome Summary: Pt is POD 0 L TKA. Pt had episode of irregular tachycardia in recovery and sent to [facility] for telemetry monitoring. Pt reports IND at baseline, has 15 steps to enter from basement, and lives with supportive wife. Upon PT evaluation, pt completed bed mobility with sba, cga for transfers and ambulated 15' with cga using rw. Pt demonstrates expected post op pain, weakness, decreased ROM, and decreased activity tolerance. Vitals monitored and stable throughout evaluation. Anticipate dc home with assist and HH.    Patient was intermittently wearing a face mask during this therapy encounter. Therapist used appropriate personal protective equipment including eye protection, mask, and gloves.  Mask used was standard procedure mask. Appropriate PPE was worn during the entire therapy session. Hand hygiene was completed before and after therapy session. Patient is not in enhanced droplet precautions.

## 2021-04-26 NOTE — NURSING NOTE
Pt went into irregular heart rhythm lasting approximately 1 minute, heart rate remained 55-60 throughout rhythm change, pt asymptomatic, pt currently has implanted loop recorder. Dr. Zhang AA to bedside in PACU to assess pt, EKG reviewed, cardiac consult ordered per MD.

## 2021-04-26 NOTE — CONSULTS
Patient Name: Berhane Chaidez  :1950  71 y.o.    Date of Admission: 2021  Encounter Provider: Dionna Mendoza MD  Date of Encounter Visit: 21  Place of Service: [facility] CARDIOLOGY  Referring Provider: Rober Andrews MD  Patient Care Team:  Aung Britt MD as PCP - General (Family Medicine)  Sosa Donohue MD as Consulting Physician (Dermatology)  Nael Morgan MD as Consulting Physician (Cardiac Electrophysiology)  Cruz Dumont Jr., MD as Consulting Physician (Urology)      Chief complaint: Irregular heart rhythm      History of Present Illness:     This is a gentleman with a history of hypertension, hyperlipidemia, paroxysmal supraventricular tachycardia and obstructive sleep apnea.  He came in today for a total knee replacement.  After surgery he went into a wide-complex tachycardia lasting less than 1 minute.  He was asymptomatic.  He has a loop recorder in place.  He follows with [facility] with Dr. Morgan.  He denies ever having atrial fibrillation but says that he has had some abnormal rhythm and he was placed on flecainide.  He did take his flecainide this morning.  He took his beta-blocker this morning.  He denies any chest pain, shortness of breath, palpitations or syncope.      Past Medical History:   Diagnosis Date   • Arthritis     OSTEO fingers, knees   • COVID-19 vaccine series completed     2ND DOSE 2021   • Diverticular disease    • ED (erectile dysfunction)    • Elevated cholesterol    • H/O atrial tachycardia     Dr Morgan follows, loop recorder in place   • History of kidney stones    • History of PSVT (paroxysmal supraventricular tachycardia)    • History of transfusion    • Hyperlipidemia    • Hypertension    • Low testosterone    • MRSA infection     h/o left hand years ago, no issues since, Dr Donohue derm treated   • Sleep apnea     Cpap   • Status post placement of implantable loop recorder      annual, Dr Morgan follows       Past Surgical History:   Procedure Laterality Date   • CARPAL TUNNEL RELEASE Right    • CATARACT EXTRACTION Bilateral    • CATARACT EXTRACTION      LEFT AND RIGHT   • COLONOSCOPY     • COLONOSCOPY N/A 3/6/2019    Procedure: COLONOSCOPY TO CECUM AND TI WITH HOT AND COLD POLYPECTOMIES;  Surgeon: Ana Dai MD;  Location: [facility] ENDOSCOPY;  Service: Gastroenterology   • CYST REMOVAL     • EXCISION MASS TRUNK N/A 3/11/2020    Procedure: TRUNK LESION/CYST EXCISION, x 2 back;  Surgeon: Gill Maldonado MD;  Location:  [facility] OR;  Service: General;  Laterality: N/A;   • INCARCERATED HERNIA REPAIR      History of Incarcerated Umbilical Hernia Repair for Person Over Age 5    • OTHER SURGICAL HISTORY      LOOP RECORDER PLACEMENT   • [place] TOTAL KNEE ARTHROPLASTY Right 5/22/2017    Procedure: RT TOTAL KNEE ARTHROPLASTY;  Surgeon: Rober Andrews MD;  Location: [facility] MAIN OR;  Service: Orthopedics   • TONSILLECTOMY     • TOTAL HIP ARTHROPLASTY      LEFT 2003 RIGHT 2013   • TOTAL SHOULDER ARTHROPLASTY      RIGHT 2012 AND LEFT 2013   • VASECTOMY     • WISDOM TOOTH EXTRACTION           Prior to Admission medications    Medication Sig Start Date End Date Taking? Authorizing Provider   bisoprolol (ZEBeta) 5 MG tablet Take 5 mg by mouth Daily. 10/18/17  Yes ProviderJoann MD   flecainide (TAMBOCOR) 50 MG tablet Take 50 mg by mouth 2 (Two) Times a Day. 10/18/17  Yes ProviderJoann MD   lisinopril (PRINIVIL,ZESTRIL) 10 MG tablet Take 10 mg by mouth Daily.   Yes Provider, MD Joann   simvastatin (ZOCOR) 40 MG tablet Take 1 tablet by mouth Daily. AT BEDTIME  Patient taking differently: Take 40 mg by mouth Every Night. AT BEDTIME 8/3/20  Yes Aung Britt MD   tamsulosin (FLOMAX) 0.4 MG capsule 24 hr capsule TAKE ONE CAPSULE BY MOUTH DAILY  Patient taking differently: Take 1 capsule by mouth Every Night. 3/22/21  Yes Aung Britt MD   Chlorhexidine Gluconate 2  % pads Apply  topically. AS DIRECTED PAT  21 Yes Joann Dupree MD   mupirocin (BACTROBAN) 2 % ointment Apply  topically to the appropriate area as directed 3 (Three) Times a Day. AS DIRECTED PAT  21 Yes Joann Dupree MD   aspirin 81 MG EC tablet Take 81 mg by mouth Daily. HOLD FOR 5 DAYS    Joann Dupree MD   Boswellia-Glucosamine-Vit D (GLUCOSAMINE COMPLEX PO) Take 1,100 mg by mouth Daily. HOLD ONE WEEK PRIOR TO SURGERY    Joann Dupree MD   desonide (DesOwen) 0.05 % lotion Apply  topically to the appropriate area as directed 2 (Two) Times a Day.  Patient taking differently: Apply 1 application topically to the appropriate area as directed 2 (Two) Times a Day. 20   Aung Britt MD   Garlic 1000 MG capsule Take 1,000 mg by mouth Daily. HOLD ONE WEEK PRIOR TO SURGERY    Joann Dupree MD   L-Lysine 1000 MG tablet Take 1,000 mg by mouth Daily. HOLD ONE WEEK PRIOR TO SURGERY    oJann Dupree MD   sildenafil (REVATIO) 20 MG tablet Take 20 mg by mouth As Needed. HOLD FOR 3 DAYS PRIOR TO SURGERY    Joann Dupree MD   Testosterone (TESTOPEL IL) by Implant route. Injections Twice a Year.    Joann Dupree MD   vitamin D3 125 MCG (5000 UT) capsule capsule Take 5,000 Units by mouth Daily. HOLD ONE WEEK PRIOR TO SURGERY    Joann Dupree MD       No Known Allergies    Social History     Socioeconomic History   • Marital status:      Spouse name: Not on file   • Number of children: Not on file   • Years of education: Not on file   • Highest education level: Not on file   Tobacco Use   • Smoking status: Former Smoker     Packs/day: 1.00     Years: 5.00     Pack years: 5.00     Types: Cigarettes     Quit date: 1973     Years since quittin.7   • Smokeless tobacco: Never Used   Vaping Use   • Vaping Use: Never used   Substance and Sexual Activity   • Alcohol use: Yes     Alcohol/week: 14.0 - 17.0 standard drinks     Types:  7 Cans of beer, 7 - 10 Shots of liquor per week     Comment: Patient states he drinks a little every day   • Drug use: No   • Sexual activity: Defer       Family History   Problem Relation Age of Onset   • Heart disease Father    • Hypertension Father    • Coronary artery disease Father    • Multiple sclerosis Son    • Malig Hyperthermia Neg Hx        REVIEW OF SYSTEMS:   All other systems reviewed and negative.        Objective:     Vitals:    04/26/21 1300 04/26/21 1315 04/26/21 1330 04/26/21 1345   BP: 142/77 151/85 147/85 150/77   BP Location:       Patient Position:       Pulse: 60 64 58 61   Resp: 16 16 16 16   Temp:       TempSrc:       SpO2: 93% 91% 96% 94%   Weight:       Height:         Body mass index is 37.48 kg/m².    Intake/Output Summary (Last 24 hours) at 4/26/2021 1351  Last data filed at 4/26/2021 1345  Gross per 24 hour   Intake 1100 ml   Output 50 ml   Net 1050 ml       Constitutional: He is oriented to person, place, and time. He appears well-developed. He does not appear ill.   HENT:   Head: Normocephalic and atraumatic. Head is without contusion.   Right Ear: Hearing normal. No drainage.   Left Ear: Hearing normal. No drainage.   Nose: No nasal deformity. No epistaxis.   Eyes: Lids are normal. Right eye exhibits no exudate. Left eye exhibits no exudate.  Neck: No JVD present. Carotid bruit is not present. No tracheal deviation present. No thyroid mass and no thyromegaly present.   Cardiovascular: Normal rate, regular rhythm and normal heart sounds.    Pulses:       Posterior tibial pulses are 2+ on the right side, and 2+ on the left side.   Pulmonary/Chest: Effort normal and breath sounds normal.   Abdominal: Soft. Normal appearance and bowel sounds are normal. There is no tenderness.   Musculoskeletal: Right leg immobilized   Neurological: He is alert and oriented to person, place, and time. He has normal strength.   Skin: Skin is warm, dry and intact. No rash noted.   Psychiatric: He has a  normal mood and affect. His behavior is normal. Thought content normal.   Vitals reviewed              I personally viewed and interpreted the patient's EKG/Telemetry data.        Assessment and Plan:       1.  Nonsustained wide-complex tachycardia.  I reviewed the strips with Dr. Martin.  He is not concerned.  Plan will be to continue him on his current medications.  Monitor him on telemetry overnight and have him follow-up with his usual cardiologist as an outpatient if he has no other arrhythmia issues.  2.  Hypertension  3.  Hyperlipidemia  4.  Obstructive sleep apnea.  5.  Postoperative day 0 right knee replacement.    Dionna Mendoza MD  04/26/21  13:51 EDT

## 2021-04-26 NOTE — ANESTHESIA PREPROCEDURE EVALUATION
Anesthesia Evaluation     Patient summary reviewed   no history of anesthetic complications:  NPO Solid Status: > 8 hours  NPO Liquid Status: > 2 hours           Airway   Mallampati: I  TM distance: >3 FB  Neck ROM: full  Dental      Pulmonary     breath sounds clear to auscultation  (+) a smoker Former, sleep apnea,   (-) shortness of breath  Cardiovascular   Exercise tolerance: good (4-7 METS)    ECG reviewed  Rhythm: regular  Rate: normal    (+) hypertension (Took B-blocker this morning. Did not take his ACE-inhibitor. ), hyperlipidemia,   (-) angina, CABA    ROS comment: - ABNORMAL ECG -  Sinus rhythm  LAD, consider LAFB or inferior infarct  Anterior infarct, old  Inferior ST changes improved vs previous    Neuro/Psych  GI/Hepatic/Renal/Endo    (+) obesity,       Musculoskeletal     Abdominal    Substance History   (+) alcohol use (Multiple drinks/day),      OB/GYN          Other   arthritis,    history of cancer                    Anesthesia Plan    ASA 3     general     intravenous induction     Anesthetic plan, all risks, benefits, and alternatives have been provided, discussed and informed consent has been obtained with: patient and spouse/significant other.

## 2021-04-27 VITALS
BODY MASS INDEX: 37.36 KG/M2 | HEIGHT: 68 IN | TEMPERATURE: 98.2 F | WEIGHT: 246.47 LBS | OXYGEN SATURATION: 95 % | HEART RATE: 67 BPM | RESPIRATION RATE: 16 BRPM | DIASTOLIC BLOOD PRESSURE: 70 MMHG | SYSTOLIC BLOOD PRESSURE: 112 MMHG

## 2021-04-27 LAB
ANION GAP SERPL CALCULATED.3IONS-SCNC: 10.1 MMOL/L (ref 5–15)
BUN SERPL-MCNC: 19 MG/DL (ref 8–23)
BUN/CREAT SERPL: 17.3 (ref 7–25)
CALCIUM SPEC-SCNC: 8.8 MG/DL (ref 8.6–10.5)
CHLORIDE SERPL-SCNC: 102 MMOL/L (ref 98–107)
CO2 SERPL-SCNC: 25.9 MMOL/L (ref 22–29)
CREAT SERPL-MCNC: 1.1 MG/DL (ref 0.76–1.27)
GFR SERPL CREATININE-BSD FRML MDRD: 66 ML/MIN/1.73
GLUCOSE BLDC GLUCOMTR-MCNC: 116 MG/DL (ref 70–130)
GLUCOSE SERPL-MCNC: 121 MG/DL (ref 65–99)
HCT VFR BLD AUTO: 39.3 % (ref 37.5–51)
HGB BLD-MCNC: 13.2 G/DL (ref 13–17.7)
POTASSIUM SERPL-SCNC: 4.6 MMOL/L (ref 3.5–5.2)
QT INTERVAL: 377 MS
SODIUM SERPL-SCNC: 138 MMOL/L (ref 136–145)

## 2021-04-27 PROCEDURE — 63710000001 ASPIRIN EC 325 MG TABLET DELAYED-RELEASE: Performed by: ORTHOPAEDIC SURGERY

## 2021-04-27 PROCEDURE — 25010000002 KETOROLAC TROMETHAMINE PER 15 MG: Performed by: ORTHOPAEDIC SURGERY

## 2021-04-27 PROCEDURE — A9270 NON-COVERED ITEM OR SERVICE: HCPCS | Performed by: ORTHOPAEDIC SURGERY

## 2021-04-27 PROCEDURE — 63710000001 FLECAINIDE 50 MG TABLET: Performed by: ORTHOPAEDIC SURGERY

## 2021-04-27 PROCEDURE — 80048 BASIC METABOLIC PNL TOTAL CA: CPT | Performed by: ORTHOPAEDIC SURGERY

## 2021-04-27 PROCEDURE — 63710000001 OXYCODONE-ACETAMINOPHEN 5-325 MG TABLET: Performed by: ORTHOPAEDIC SURGERY

## 2021-04-27 PROCEDURE — 85018 HEMOGLOBIN: CPT | Performed by: ORTHOPAEDIC SURGERY

## 2021-04-27 PROCEDURE — 63710000001 BISOPROLOL 5 MG TABLET: Performed by: ORTHOPAEDIC SURGERY

## 2021-04-27 PROCEDURE — 63710000001 LISINOPRIL 10 MG TABLET: Performed by: ORTHOPAEDIC SURGERY

## 2021-04-27 PROCEDURE — 63710000001 FERROUS SULFATE 325 (65 FE) MG TABLET: Performed by: ORTHOPAEDIC SURGERY

## 2021-04-27 PROCEDURE — 82962 GLUCOSE BLOOD TEST: CPT

## 2021-04-27 PROCEDURE — 85014 HEMATOCRIT: CPT | Performed by: ORTHOPAEDIC SURGERY

## 2021-04-27 PROCEDURE — 25010000003 CEFAZOLIN IN DEXTROSE 2-4 GM/100ML-% SOLUTION: Performed by: ORTHOPAEDIC SURGERY

## 2021-04-27 PROCEDURE — 97116 GAIT TRAINING THERAPY: CPT

## 2021-04-27 RX ORDER — ASPIRIN 325 MG
325 TABLET, DELAYED RELEASE (ENTERIC COATED) ORAL DAILY
Qty: 42 TABLET | Refills: 0 | Status: SHIPPED | OUTPATIENT
Start: 2021-04-27 | End: 2021-06-08

## 2021-04-27 RX ORDER — OXYCODONE HYDROCHLORIDE AND ACETAMINOPHEN 5; 325 MG/1; MG/1
1-2 TABLET ORAL EVERY 4 HOURS PRN
Qty: 60 TABLET | Refills: 0 | Status: SHIPPED | OUTPATIENT
Start: 2021-04-27 | End: 2021-05-03

## 2021-04-27 RX ADMIN — FERROUS SULFATE TAB 325 MG (65 MG ELEMENTAL FE) 325 MG: 325 (65 FE) TAB at 08:37

## 2021-04-27 RX ADMIN — BISOPROLOL FUMARATE 5 MG: 5 TABLET, FILM COATED ORAL at 08:37

## 2021-04-27 RX ADMIN — CEFAZOLIN SODIUM 2 G: 2 INJECTION, SOLUTION INTRAVENOUS at 01:14

## 2021-04-27 RX ADMIN — OXYCODONE HYDROCHLORIDE AND ACETAMINOPHEN 2 TABLET: 5; 325 TABLET ORAL at 00:22

## 2021-04-27 RX ADMIN — FLECAINIDE ACETATE TABLET 50 MG: 50 TABLET ORAL at 08:37

## 2021-04-27 RX ADMIN — OXYCODONE HYDROCHLORIDE AND ACETAMINOPHEN 2 TABLET: 5; 325 TABLET ORAL at 10:36

## 2021-04-27 RX ADMIN — KETOROLAC TROMETHAMINE 15 MG: 30 INJECTION, SOLUTION INTRAMUSCULAR at 08:37

## 2021-04-27 RX ADMIN — OXYCODONE HYDROCHLORIDE AND ACETAMINOPHEN 1 TABLET: 5; 325 TABLET ORAL at 04:35

## 2021-04-27 RX ADMIN — SODIUM CHLORIDE, PRESERVATIVE FREE 3 ML: 5 INJECTION INTRAVENOUS at 08:56

## 2021-04-27 RX ADMIN — ASPIRIN 325 MG: 325 TABLET, COATED ORAL at 08:37

## 2021-04-27 RX ADMIN — SODIUM CHLORIDE, POTASSIUM CHLORIDE, SODIUM LACTATE AND CALCIUM CHLORIDE 100 ML/HR: 600; 310; 30; 20 INJECTION, SOLUTION INTRAVENOUS at 01:14

## 2021-04-27 RX ADMIN — OXYCODONE HYDROCHLORIDE AND ACETAMINOPHEN 1 TABLET: 5; 325 TABLET ORAL at 06:31

## 2021-04-27 RX ADMIN — LISINOPRIL 10 MG: 10 TABLET ORAL at 08:37

## 2021-04-27 NOTE — THERAPY TREATMENT NOTE
Patient Name: Berhane Chaidez  : 1950    MRN: [id number]                              Today's Date: 2021       Admit Date: 2021    Visit Dx:     ICD-10-CM ICD-9-CM   1. Primary osteoarthritis of left knee  M17.12 715.16     Patient Active Problem List   Diagnosis   • History of colon polyps   • Prediabetes   • ED (erectile dysfunction) of non-organic origin   • Hyperlipidemia   • Hypogonadism in male   • BPH (benign prostatic hyperplasia)   • Osteoarthritis of both hips   • Routine health maintenance   • History of diverticulitis   • Osteoarthritis, knee   • Supraventricular tachycardia (CMS/HCC)   • History of basal cell carcinoma   • Onychomycosis   • Adenomatous polyp   • Infected sebaceous cyst of skin   • DJD (degenerative joint disease) of knee   • DJD (degenerative joint disease)     Past Medical History:   Diagnosis Date   • Arthritis     OSTEO fingers, knees   • COVID-19 vaccine series completed     2ND DOSE 2021   • Diverticular disease    • ED (erectile dysfunction)    • Elevated cholesterol    • H/O atrial tachycardia     Dr Morgan follows, loop recorder in place   • History of kidney stones    • History of PSVT (paroxysmal supraventricular tachycardia)    • History of transfusion    • Hyperlipidemia    • Hypertension    • Low testosterone    • MRSA infection     h/o left hand years ago, no issues since, Dr Donohue derm treated   • Sleep apnea     Cpap   • Status post placement of implantable loop recorder     annual, Dr Morgan follows     Past Surgical History:   Procedure Laterality Date   • CARPAL TUNNEL RELEASE Right    • CATARACT EXTRACTION Bilateral    • CATARACT EXTRACTION      LEFT AND RIGHT   • COLONOSCOPY     • COLONOSCOPY N/A 3/6/2019    Procedure: COLONOSCOPY TO CECUM AND TI WITH HOT AND COLD POLYPECTOMIES;  Surgeon: Ana Dai MD;  Location: [facility] ENDOSCOPY;  Service: Gastroenterology   • CYST REMOVAL     • EXCISION MASS TRUNK N/A 3/11/2020    Procedure:  TRUNK LESION/CYST EXCISION, x 2 back;  Surgeon: Gill Maldonado MD;  Location: [facility] OR;  Service: General;  Laterality: N/A;   • INCARCERATED HERNIA REPAIR      History of Incarcerated Umbilical Hernia Repair for Person Over Age 5    • OTHER SURGICAL HISTORY      LOOP RECORDER PLACEMENT   • [place] TOTAL KNEE ARTHROPLASTY Right 5/22/2017    Procedure: RT TOTAL KNEE ARTHROPLASTY;  Surgeon: Rober Andrews MD;  Location: [facility] MAIN OR;  Service: Orthopedics   • TONSILLECTOMY     • TOTAL HIP ARTHROPLASTY      LEFT 2003 RIGHT 2013   • TOTAL KNEE ARTHROPLASTY Left 4/26/2021    Procedure: TOTAL KNEE ARTHROPLASTY;  Surgeon: Rober Andrews MD;  Location: [facility] MAIN OR;  Service: Orthopedics;  Laterality: Left;   • TOTAL SHOULDER ARTHROPLASTY      RIGHT 2012 AND LEFT 2013   • VASECTOMY     • WISDOM TOOTH EXTRACTION       General Information     Row Name 04/27/21 0826          Physical Therapy Time and Intention    Document Type  therapy note (daily note)  -AE     Mode of Treatment  individual therapy;physical therapy  -AE     Row Name 04/27/21 0826          General Information    Patient Profile Reviewed  yes  -AE       User Key  (r) = Recorded By, (t) = Taken By, (c) = Cosigned By    Initials Name Provider Type    AE Neena Tobar PT Physical Therapist        Mobility     Row Name 04/27/21 0826          Bed Mobility    Bed Mobility  bed mobility (all) activities  -AE     All Activities, Mayes (Bed Mobility)  independent  -AE     Row Name 04/27/21 0826          Transfers    Comment (Transfers)  independent all transfers with FWW  -AE     Row Name 04/27/21 0826          Bed-Chair Transfer    Bed-Chair Mayes (Transfers)  modified independence  -AE     Assistive Device (Bed-Chair Transfers)  walker, front-wheeled  -AE     Row Name 04/27/21 0826          Sit-Stand Transfer    Sit-Stand Mayes (Transfers)  modified independence  -AE     Assistive Device (Sit-Stand Transfers)  walker, front-wheeled   -AE     Row Name 04/27/21 0826          Gait/Stairs (Locomotion)    Rogers Level (Gait)  supervision  -AE     Assistive Device (Gait)  walker, front-wheeled  -AE     Distance in Feet (Gait)  120ft  -AE     Rogers Level (Stairs)  contact guard  -AE     Handrail Location (Stairs)  left side (ascending)  -AE     Number of Steps (Stairs)  10  -AE     Ascending Technique (Stairs)  step-to-step  -AE     Descending Technique (Stairs)  step-to-step  -AE       User Key  (r) = Recorded By, (t) = Taken By, (c) = Cosigned By    Initials Name Provider Type    AE Neena Tobar, JONATHAN Physical Therapist        Obj/Interventions     Row Name 04/27/21 0827          Range of Motion Comprehensive    Comment, General Range of Motion  L knee 5-80  -AE       User Key  (r) = Recorded By, (t) = Taken By, (c) = Cosigned By    Initials Name Provider Type    AE Neena Tobar, JONATHAN Physical Therapist        Goals/Plan    No documentation.       Clinical Impression     Row Name 04/27/21 0827          Pain    Additional Documentation  Pain Scale: Numbers Pre/Post-Treatment (Group)  -AE     [facility] Name 04/27/21 0827          Pain Scale: Numbers Pre/Post-Treatment    Pretreatment Pain Rating  8/10  -AE     Posttreatment Pain Rating  9/10  -AE     Pain Location - Side  Left  -AE     Pain Location - Orientation  incisional  -AE     Pain Location  knee  -AE     Pain Intervention(s)  Repositioned;Ambulation/increased activity;Rest  -AE     Row Name 04/27/21 0827          Positioning and Restraints    Pre-Treatment Position  in bed  -AE     Post Treatment Position  bed  -AE     In Bed  supine;call light within reach;encouraged to call for assist;exit alarm on  -AE       User Key  (r) = Recorded By, (t) = Taken By, (c) = Cosigned By    Initials Name Provider Type    AE Neena Tobar, JONATHAN Physical Therapist        Outcome Measures     Row Name 04/27/21 0829          How much help from another person do you currently need...    Turning from  your back to your side while in flat bed without using bedrails?  4  -AE     Moving from lying on back to sitting on the side of a flat bed without bedrails?  4  -AE     Moving to and from a bed to a chair (including a wheelchair)?  4  -AE     Standing up from a chair using your arms (e.g., wheelchair, bedside chair)?  4  -AE     Climbing 3-5 steps with a railing?  4  -AE     To walk in hospital room?  4  -AE     AM-PAC 6 Clicks Score (PT)  24  -AE     Row Name 04/27/21 0829          Functional Assessment    Outcome Measure Options  AM-PAC 6 Clicks Basic Mobility (PT)  -AE       User Key  (r) = Recorded By, (t) = Taken By, (c) = Cosigned By    Initials Name Provider Type    AE Neena Tobar, PT Physical Therapist        Physical Therapy Education                 Title: PT OT SLP Therapies (Done)     Topic: Physical Therapy (Done)     Point: Mobility training (Done)     Learning Progress Summary           Patient Acceptance, E, VU by  at 4/26/2021 1626                   Point: Home exercise program (Done)     Learning Progress Summary           Patient Acceptance, E, VU by CF at 4/26/2021 1626                   Point: Body mechanics (Done)     Learning Progress Summary           Patient Acceptance, E, VU by CF at 4/26/2021 1626                   Point: Precautions (Done)     Learning Progress Summary           Patient Acceptance, E, VU by  at 4/26/2021 1626                               User Key     Initials Effective Dates Name Provider Type Discipline     09/02/20 -  Hilda Castillo, PT Physical Therapist PT              PT Recommendation and Plan           Time Calculation:   PT Charges     Row Name 04/27/21 0907 04/27/21 0904          Time Calculation    Start Time  0800  -AE  --     Stop Time  0815  -AE  --     Time Calculation (min)  15 min  -AE  --     PT Received On  04/27/21  -AE  04/27/21  -AE        Time Calculation- PT    Total Timed Code Minutes- PT  15 minute(s)  -AE  --       User Key  (r)  = Recorded By, (t) = Taken By, (c) = Cosigned By    Initials Name Provider Type    AE Neena Tobar, JONATHAN Physical Therapist        Therapy Charges for Today     Code Description Service Date Service Provider Modifiers Qty    81822363737 HC GAIT TRAINING EA 15 MIN 4/27/2021 Neena Tobar, PT GP 1    22633636986 HC PT THER SUPP EA 15 MIN 4/27/2021 eNena Tobar, PT GP 1    18413580812 HC GAIT TRAINING EA 15 MIN 4/27/2021 Neena Tobar, PT GP 1          PT G-Codes  Outcome Measure Options: AM-PAC 6 Clicks Basic Mobility (PT)  AM-PAC 6 Clicks Score (PT): 24    Neena Tobar PT  4/27/2021

## 2021-04-27 NOTE — PROGRESS NOTES
"  Orthopaedic Surgery   Daily Progress Note  Dr. Rober Andrews   [phone]  DEMOGRAPHICS:   · Berhane Chaidez   · Age:71 y.o.   · MRN:[id number]  · Admitted: 4/26/2021    PROCEDURE: 1 Day Post-Op s/p Procedure(s):  TOTAL KNEE ARTHROPLASTY     /81 (BP Location: Right arm, Patient Position: Lying)   Pulse 64   Temp 98.3 °F (36.8 °C) (Oral)   Resp 16   Ht 172.7 cm (68\")   Wt 112 kg (246 lb 7.6 oz)   SpO2 95%   BMI 37.48 kg/m²     Lab Results (last 24 hours)     Procedure Component Value Units Date/Time    Basic Metabolic Panel [[id number]]  (Abnormal) Collected: 04/27/21 0446    Specimen: Blood Updated: 04/27/21 0627     Glucose 121 mg/dL      BUN 19 mg/dL      Creatinine 1.10 mg/dL      Sodium 138 mmol/L      Potassium 4.6 mmol/L      Chloride 102 mmol/L      CO2 25.9 mmol/L      Calcium 8.8 mg/dL      eGFR Non African Amer 66 mL/min/1.73      BUN/Creatinine Ratio 17.3     Anion Gap 10.1 mmol/L     Narrative:      GFR Normal >60  Chronic Kidney Disease <60  Kidney Failure <15      POC Glucose Once [[id number]]  (Normal) Collected: 04/27/21 0557    Specimen: Blood Updated: 04/27/21 0559     Glucose 116 mg/dL     Hemoglobin & Hematocrit, Blood [[id number]]  (Normal) Collected: 04/27/21 0446    Specimen: Blood Updated: 04/27/21 0557     Hemoglobin 13.2 g/dL      Hematocrit 39.3 %     POC Glucose Once [[id number]]  (Abnormal) Collected: 04/26/21 2013    Specimen: Blood Updated: 04/26/21 2015     Glucose 138 mg/dL     POC Glucose Once [[id number]]  (Abnormal) Collected: 04/26/21 1612    Specimen: Blood Updated: 04/26/21 1616     Glucose 143 mg/dL           Imaging Results (Last 24 Hours)     Procedure Component Value Units Date/Time    XR Knee 1 or 2 View Left [[id number]] Collected: 04/26/21 1157     Updated: 04/26/21 1203    Narrative:      XR KNEE 1 OR 2 VW LEFT-  04/26/2021     HISTORY: Postop left knee arthroplasty.     The distal femoral and proximal tibia components of the left knee  prosthesis are " well-seated with no abnormal surrounding bony lucencies.  Soft tissue air and drainage catheter are seen.       Impression:      Satisfactory postoperative appearance of the left knee.     This report was finalized on 4/26/2021 12:00 PM by Dr. Jason Valencia M.D.             Patient Care Team:  Aung Britt MD as PCP - General (Family Medicine)  Sosa Donohue MD as Consulting Physician (Dermatology)  Nael Morgan MD as Consulting Physician (Cardiac Electrophysiology)  Cruz Dumont Jr., MD as Consulting Physician (Urology)    SUBJECTIVE  Comfortable    PHYSICAL EXAM  He has some bleeding from his incision last night.  Dressing was changed.  It is dry now.  Some problems urinating last night.  This seems to be resolved  Postop tachycardia.  Stable now.     ASSESSMENT: Patient is a 71 y.o. male who is 1 Day Post-Op s/p Procedure(s):  1.  TOTAL KNEE ARTHROPLASTY left  2.  Postop tachycardia.  He is stable now.  Evidently okay for discharge  3.  He had some difficulties urinating postop but this seems to be resolved    PLAN / DISPOSITION:  Aspirin for DVT prevention  Discharge today if okay with cardiology    Rober Andrews Sr, MD  Orthopaedic Surgery  [facility]  [phone]

## 2021-04-27 NOTE — DISCHARGE SUMMARY
Discharge Summary   Rober Andrews M.D.    NAME: Berhane Chaidez ADMIT: 2021   : 1950  PCP: Aung Britt MD    MRN: [id number] LOS: 1 days   AGE/SEX: 71 y.o. male  ROOM: E652/1       Date of Discharge: 2021    Primary Discharge Diagnosis:  DJD (degenerative joint disease) of knee [M17.10]  DJD (degenerative joint disease) [M19.90]    Secondary Discharge Diagnosis:    Problems Addressed this Visit        Musculoskeletal and Injuries    DJD (degenerative joint disease) - Primary    Relevant Medications    oxyCODONE-acetaminophen (PERCOCET) 5-325 MG per tablet      Diagnoses       Codes Comments    Primary osteoarthritis of left knee    -  Primary ICD-10-CM: M17.12  ICD-9-CM: 715.16           Procedures Performed:  Left Total Knee Arthroplasty    Hospital Course:    Berhane Chaidez is a 71 y.o.  male who underwent successful Left Total Knee Arthroplasty on 2021.  Berhane Chaidez was started on Aspirin 325mg po daily immediately post-operatively for DVT prophylaxis.  On post-op day 1 the patients dressing was changed, drain removed and their incision was clean, with no signs of infection and their calf was soft, with no signs of DVT.  The patient progressed well with physical therapy and the patients hemoglobin remained stable. On post-operative day 1 the patient was felt ready for discharge.  He did have postop tachycardia in the recovery room.  Cardiology saw him.    Total Knee Joint Replacement Discharge Instructions:    I. ACTIVITIES:    1. Exercises:  ? Complete exercise program as taught by the hospital physical therapist 2 times per day  ? Exercise program will be advanced by the physical therapist  ? During the day be up ambulating every 2 hours (while awake) for short distances  ? Complete the ankle pump exercises at least 10 times per hour (while awake)  ? Elevate legs most of the day the first week post operatively and thereafter elevate legs when in bed and for at least 30  minutes during the day. Caution must be taken to avoid pillow placement under the bend of the knee as this can led to flexion contractures of the knee.  ? Use cold packs 20-30 minutes approximately 5 times per day. This should be done before and after completing your exercises and at any time you are experiencing pain/ stiffness in your operative extremity.    2. Activities of Daily Living:  ? No tub baths, hot tubs, or swimming pools for 4 weeks  ? May shower and let water run over the incision on post-operative day #7 if no drainage. Do not scrub or rub the incision. Simply let the water run over the incision and pat dry.    II. Precautions:  ? Everyone that comes near you should wash their hands  ? No elective dental, genital-urinary, or colon procedures or surgical procedures for 12 weeks after surgery unless absolutely necessary.  ? If dental work or surgical procedure is deemed absolutely necessary during the first 12 weeks, you will need to contact your surgeon as you will need to take antibiotics 1 hour prior to any dental work (including teeth cleanings).  ? Please discuss with your surgeon prophylactic antibiotics as the length of time this intervention will be necessary for you varies with each patient’s health history and situation.  ? Avoid sick people. If you must be around someone who is ill, they should wear a mask.  ? Avoid visits to the Emergency Room or Urgent Care unless you are having a life threatening event.     III. INCISION CARE:  ? Wash your hands prior to dressing changes  ? Change the dressing as needed to keep incision clean and dry. Utilize dry gauze and paper tape. Avoid touching the side of the gauze that goes against the incision with your hands.  ? No creams or ointments to the incision  ? May remove dressing once the incision is free of drainage  ? Do not touch or pick at the incision  ? Check incision every day and notify surgeon immediately if any of the following signs or  symptoms are noted:  o Increase in redness  o Increase in swelling around the incision and of the entire extremity  o Increase in pain  o Drainage oozing from the incision  o Pulling apart of the edges of the incision  o Increase in overall body temperature (greater than 100.5 degrees)  ? Your surgeon will instruct you regarding suture or staple removal    IV. Medications:     1. Anticoagulants: You will be discharged on an anticoagulant. This is a prophylactic medication that helps prevent blood clots during your post-operative period. The type and length of dosage varies based on your individual needs, procedure performed, and surgeon’s preference.    ? While taking the anticoagulant, you should avoid taking any additional aspirin, ibuprofen (Advil or Motrin), Aleve (Naprosyn) or other non-steroidal anti-inflammatory medications.   ? Notify surgeon immediately if any juliana bleeding is noted in the urine, stool, emesis, or from the nose or the incision. Blood in the stool will often appear as black rather than red. Blood in urine may appear as pink. Blood in emesis may appear as brown/black like coffee grounds.  ? You will need to apply pressure for longer periods of time to any cuts or abrasions to stop bleeding  ? Avoid alcohol while taking anticoagulants    2. Stool Softeners: You will be at greater risk of constipation after surgery due to being less mobile and the pain medications.     ? Take stool softeners as instructed by your surgeon while on pain medications. Bran cereal is most effective. Over the counter Colace 100 mg 1-2 capsules twice daily.   ? Drink plenty of fluids, and eat fruits and vegetables during your recovery time    3. Pain Medications utilized after surgery are narcotics and the law requires that the following information be given to all patients that are prescribed narcotics:    ? CLASSIFICATION: Pain medications are called Opioids and are narcotics  ? LEGALITIES: It is illegal to share  narcotics with others and to drive within 24 hours of taking narcotics  ? POTENTIAL SIDE EFFECTS: Potential side effects of opioids include: nausea, vomiting, itching, dizziness, drowsiness, dry mouth, constipation, and difficulty urinating.  ? POTENTIAL ADVERSE EFFECTS:   o Opioid tolerance can develop with use of pain medications and this simply means that it requires more and more of the medication to control pain; however, this is seen more in patients that use opioids for longer periods of time.  o Opioid dependence can develop with use of Opioids and this simply means that to stop the medication can cause withdrawal symptoms; however, this is seen with patients that use Opioids for longer periods of time.  o Opioid addiction can develop with use of Opioids and the incidence of this is very unlikely in patients who take the medications as ordered and stop the medications as instructed.  o Opioid overdose can be dangerous, but is unlikely when the medication is taken as ordered and stopped when ordered. It is important not to mix opioids with alcohol or with and type of sedative such as Benadryl as this can lead to over sedation and respiratory difficulty.  ? DOSAGE:   o Pain medications will need to be taken consistently for the first week to decrease pain and promote adequate pain relief and participation in physical therapy.  o After the initial surgical pain begins to resolve, you may begin to decrease the pain medication. By the end of 6-8 weeks, you should be off of pain medications.  o Refills will not be given by the office during evening hours, on weekends, or after 6-8 weeks post-op.  o To seek refills on pain medications during the initial 6 week post-operative period, you must call the office 48 hours in advance to request the refill. The office will then notify you when to  the prescription. DO NOT wait until you are out of the medication to request a refill.    V. FOLLOW-UP VISITS:  ? You  will need to follow up in the office with your surgeon in 3 weeks. Please call this number [phone] to schedule this appointment.  If you have any concerns or suspected complications prior to your follow up visit, please call your surgeons office. Do not wait until your appointment time if you suspect complications. These will need to be addressed in the office promptly.    Final diagnosis:  1.  End-stage primary osteoarthritis left knee.  Is undergone left total knee replacement this admission  2.  2017 right knee replacement  3.  History of tachycardia.  He had an episode postop.  Cardiology has seen him in consultation postop.  4.  Hypertension  5.  BPH    Discharge Medications:     1) Percocet 5/325 1-2 po q 4-6 hours for pain control  2)  Aspirin 325 mg po daily for 6 weeks.      Rober Andrews MD  4/27/2021  07:23 EDT

## 2021-04-27 NOTE — DISCHARGE PLACEMENT REQUEST
"Petra Chaidez (71 y.o. Male)     Date of Birth Social Security Number Address Home Phone MRN    1950  [address] [facility] [address] [phone] [id number]    Sikhism Marital Status          Anglican        Admission Date Admission Type Admitting Provider Attending Provider Department, Room/Bed    4/26/21 Elective Rober Andrews MD Sweet, Richard A, MD [address], [address]652[address]1    Discharge Date Discharge Disposition Discharge Destination         Home or Self Care              Attending Provider: Rober Andrews MD    Allergies: No Known Allergies    Isolation: None   Infection: MRSA/History Only (03/09/20)   Code Status: CPR    Ht: 172.7 cm (68\")   Wt: 112 kg (246 lb 7.6 oz)    Admission Cmt: None   Principal Problem: None                Active Insurance as of 4/26/2021     Primary Coverage     Payor Plan Insurance Group Employer/Plan Group    ANTHEM MEDICARE REPLACEMENT ANTHEM MEDICARE ADVANTAGE [id number]     Payor Plan Address Payor Plan Phone Number Payor Plan Fax Number Effective Dates    [address] [phone]  1/1/2016 - None Entered    [facility] [address]       Subscriber Name Subscriber Birth Date Member ID       PETRA CHAIDEZ 1950 [id number]                 Emergency Contacts      (Rel.) Home Phone Work Phone Mobile Phone    DmNini (Spouse) [phone] -- [phone]              "

## 2021-04-27 NOTE — PLAN OF CARE
Goal Outcome Evaluation:     Progress: no change  Outcome Summary: Patient alert and oriented. Patient complained of pain. Medication given. PT worked with patient. Patient to be discharged home today.

## 2021-04-27 NOTE — DISCHARGE INSTRUCTIONS
Call Dr. Andrews's office if you continue to have a lot of drainage from incision.   FOLLOW-UP VISITS:  · You will need to follow up in the office with your surgeon in 3 weeks. Please call this number [phone] to schedule this appointment.  If you have any concerns or suspected complications prior to your follow up visit, please call your surgeons office. Do not wait until your appointment time if you suspect complications. These will need to be addressed in the office promptly.

## 2021-04-27 NOTE — PLAN OF CARE
Goal Outcome Evaluation:         Patient alert follows commands, urinating in urinal iv fluids infusing, patient urinating so much yellow clear urine, patient refusing iv fluids at this time. Iv antibx given. Oral pain meds given. Drsg on left knee redressed due to bloody drainage moderate amt. Hemovac removed. Drsg placed over puncture site from hemovac. Left knee dressed with 2x2 border knee island drsg, abd pad, kelrix and ace wrap. Drsg clean dry intact at this time. Patient turning in bed and sitting on side of bed independently. No nausea noted. No acute distress noted. Patient did not need to wear any oxygen from [facility] and normally sleeps with cpap. Will continue to monitor.

## 2021-04-27 NOTE — CASE MANAGEMENT/SOCIAL WORK
Discharge Planning Assessment  [facility]     Patient Name: Berhane Chaidez  MRN: [id number]  Today's Date: 4/27/2021    Admit Date: 4/26/2021    Discharge Needs Assessment     Row Name 04/27/21 0922       Living Environment    Lives With  spouse    Name(s) of Who Lives With Patient  Nini Chaidez wife [phone]    Current Living Arrangements  home/apartment/condo    Primary Care Provided by  self    Provides Primary Care For  no one    Family Caregiver if Needed  spouse    Family Caregiver Names  Nini Chaidez wife [phone]    Able to Return to Prior Arrangements  yes       Resource/Environmental Concerns    Resource/Environmental Concerns  none    Transportation Concerns  car, none       Transition Planning    Patient/Family Anticipates Transition to  home with family    Patient/Family Anticipated Services at Transition  home health care    Transportation Anticipated  family or friend will provide       Discharge Needs Assessment    Readmission Within the Last 30 Days  no previous admission in last 30 days    Equipment Currently Used at Home  cane, straight;walker, rolling;crutches, axillary    Concerns to be Addressed  discharge planning    Anticipated Changes Related to Illness  none    Discharge Facility/Level of Care Needs  home with home health    Provided Post Acute Provider List?  N/A    N/A Provider List Comment  [facility]    Provided Post Acute Provider Quality & Resource List?  N/A    N/A Quality & Resource List Comment  Patient [facility]        Discharge Plan     Row Name 04/27/21 0927       Plan    Plan  Home with family and [facility]    Patient/Family in Agreement with Plan  yes    Plan Comments  MOON 4/27/2021.  Met with patient and Nini Chaidez wife [phone] at bedside. Patient granted me permission to speak with him while wife remained in the room.  Face sheet verified. Prior to admission patient was independent with all aspects of his ADL’s.   He has walker, cane and crutches to use at home. Patient will be using [facility] upon discharge. Patient states Nini Chaidez wife [phone] is his health care surrogate. Discharge plans discussed, plan is to return home with family and [facility] - spoke with Cristina she is following.  No additional needs identified at this time. Family will transport.    Final Discharge Disposition Code  06 - home with home health care    Final Note  Discharge home with family and [facility] - Cristina is following        Continued Care and Services - Admitted Since 4/26/2021     Home Medical Care     Service Provider Request Status Selected Services Address Phone Fax Patient Preferred    [facility]  Pending - Request Sent N/A [address] [address] [phone] --              Expected Discharge Date and Time     Expected Discharge Date Expected Discharge Time    Apr 27, 2021         Demographic Summary     Row Name 04/27/21 0920       General Information    Admission Type  observation    Arrived From  home    Required Notices Provided  Observation Status Notice    Referral Source  admission list    Reason for Consult  discharge planning    Preferred Language  English     Used During This Interaction  no       Contact Information    Permission Granted to Share Info With  family/designee Nini Chaidez wife [phone]        Functional Status     Row Name 04/27/21 0922       Functional Status    Usual Activity Tolerance  good    Current Activity Tolerance  good       Functional Status, IADL    Medications  independent       Mental Status    General Appearance WDL  WDL       Mental Status Summary    Recent Changes in Mental Status/Cognitive Functioning  no changes       Employment/    Employment Status  retired        Psychosocial    No documentation.       Abuse/Neglect    No documentation.       Legal    No documentation.       Substance  Abuse    No documentation.       Patient Forms    No documentation.           Missy Hernandez RN

## 2021-04-27 NOTE — CASE MANAGEMENT/SOCIAL WORK
Case Management Discharge Note      Final Note: Discharge home with family and [facility] - Cristina is following    Provided Post Acute Provider List?: N/A  N/A Provider List Comment: [facility]  Provided Post Acute Provider Quality & Resource List?: N/A  N/A Quality & Resource List Comment: Patient requested [facility]    Selected Continued Care - Admitted Since 4/26/2021     Destination    No services have been selected for the patient.              Durable Medical Equipment    No services have been selected for the patient.              Dialysis/Infusion    No services have been selected for the patient.              Home Medical Care    No services have been selected for the patient.              Therapy    No services have been selected for the patient.              Community Resources    No services have been selected for the patient.                       Final Discharge Disposition Code: 06 - home with home health care

## 2021-04-27 NOTE — PROGRESS NOTES
Reviewed tele. No further arrhythmias noted.   Ok for discharge. Follow up with usual cardiologist at [facility].     Maritza Walter, APRN  04/27/21  09:43 EDT     no fever and no chills.

## 2021-06-28 ENCOUNTER — OFFICE VISIT (OUTPATIENT)
Dept: INTERNAL MEDICINE | Facility: CLINIC | Age: 71
End: 2021-06-28

## 2021-06-28 VITALS
WEIGHT: 234 LBS | HEIGHT: 68 IN | BODY MASS INDEX: 35.46 KG/M2 | HEART RATE: 87 BPM | TEMPERATURE: 97.1 F | OXYGEN SATURATION: 97 % | RESPIRATION RATE: 16 BRPM | DIASTOLIC BLOOD PRESSURE: 60 MMHG | SYSTOLIC BLOOD PRESSURE: 140 MMHG

## 2021-06-28 DIAGNOSIS — J40 BRONCHITIS: Primary | ICD-10-CM

## 2021-06-28 PROCEDURE — 99213 OFFICE O/P EST LOW 20 MIN: CPT | Performed by: FAMILY MEDICINE

## 2021-06-28 RX ORDER — AZITHROMYCIN 250 MG/1
TABLET, FILM COATED ORAL
Qty: 6 TABLET | Refills: 0 | Status: SHIPPED | OUTPATIENT
Start: 2021-06-28 | End: 2021-08-05

## 2021-06-28 RX ORDER — BISOPROLOL FUMARATE 5 MG/1
TABLET, FILM COATED ORAL
COMMUNITY
Start: 2021-05-10

## 2021-06-28 RX ORDER — ALBUTEROL SULFATE 90 UG/1
2 AEROSOL, METERED RESPIRATORY (INHALATION) EVERY 4 HOURS PRN
Qty: 18 G | Refills: 0 | Status: SHIPPED | OUTPATIENT
Start: 2021-06-28 | End: 2021-08-05

## 2021-06-28 RX ORDER — BENZONATATE 200 MG/1
200 CAPSULE ORAL 3 TIMES DAILY PRN
Qty: 30 CAPSULE | Refills: 0 | Status: SHIPPED | OUTPATIENT
Start: 2021-06-28 | End: 2021-08-05

## 2021-06-28 RX ORDER — SULFAMETHOXAZOLE AND TRIMETHOPRIM 800; 160 MG/1; MG/1
TABLET ORAL
COMMUNITY
Start: 2021-06-18 | End: 2021-08-05

## 2021-06-28 NOTE — PROGRESS NOTES
Subjective   Berhane Chaidez is a 71 y.o. male presenting today for follow up of   Chief Complaint   Patient presents with   • Cough     started 2 days ago. unable to sleep well due to cough       History of Present Illness     Pt presents with the complaint of 2 days of cough.  He was coughing severely during the night and he couldn't sleep.  He was having a hard time catching his breath during his coughing spells.  He has fatigue and malaise as well.  He has congestion and runny nose and sore throat.  C/O post nasal drip.  Denies fevers.  Denies sinus pain and pressure.  No sick contacts.  He had his Covid-19 vaccine 3 months ago.    Patient Active Problem List   Diagnosis   • History of colon polyps   • Prediabetes   • ED (erectile dysfunction) of non-organic origin   • Hyperlipidemia   • Hypogonadism in male   • BPH (benign prostatic hyperplasia)   • Osteoarthritis of both hips   • Routine health maintenance   • History of diverticulitis   • Osteoarthritis, knee   • Supraventricular tachycardia (CMS/HCC)   • History of basal cell carcinoma   • Onychomycosis   • Adenomatous polyp   • Infected sebaceous cyst of skin   • DJD (degenerative joint disease) of knee   • DJD (degenerative joint disease)       Current Outpatient Medications on File Prior to Visit   Medication Sig   • bisoprolol (ZEBeta) 5 MG tablet TAKE ONE TABLET BY MOUTH DAILY   • Boswellia-Glucosamine-Vit D (GLUCOSAMINE COMPLEX PO) Take 1,100 mg by mouth Daily. HOLD ONE WEEK PRIOR TO SURGERY   • desonide (DesOwen) 0.05 % lotion Apply  topically to the appropriate area as directed 2 (Two) Times a Day. (Patient taking differently: Apply 1 application topically to the appropriate area as directed 2 (Two) Times a Day.)   • flecainide (TAMBOCOR) 50 MG tablet Take 50 mg by mouth 2 (Two) Times a Day.   • Garlic 1000 MG capsule Take 1,000 mg by mouth Daily. HOLD ONE WEEK PRIOR TO SURGERY   • L-Lysine 1000 MG tablet Take 1,000 mg by mouth Daily. HOLD ONE WEEK  "PRIOR TO SURGERY   • lisinopril (PRINIVIL,ZESTRIL) 10 MG tablet Take 10 mg by mouth Daily.   • sildenafil (REVATIO) 20 MG tablet Take 20 mg by mouth As Needed. HOLD FOR 3 DAYS PRIOR TO SURGERY   • simvastatin (ZOCOR) 40 MG tablet Take 1 tablet by mouth Daily. AT BEDTIME (Patient taking differently: Take 40 mg by mouth Every Night. AT BEDTIME)   • sulfamethoxazole-trimethoprim (BACTRIM DS,SEPTRA DS) 800-160 MG per tablet    • tamsulosin (FLOMAX) 0.4 MG capsule 24 hr capsule TAKE ONE CAPSULE BY MOUTH DAILY (Patient taking differently: Take 1 capsule by mouth Every Night.)   • Testosterone (TESTOPEL IL) by Implant route. Injections Twice a Year.   • vitamin D3 125 MCG (5000 UT) capsule capsule Take 5,000 Units by mouth Daily. HOLD ONE WEEK PRIOR TO SURGERY   • [DISCONTINUED] bisoprolol (ZEBeta) 5 MG tablet Take 5 mg by mouth Daily.     No current facility-administered medications on file prior to visit.          The following portions of the patient's history were reviewed and updated as appropriate: allergies, current medications, past family history, past medical history, past social history, past surgical history and problem list.    Review of Systems   Constitutional: Positive for fatigue. Negative for fever.   HENT: Positive for congestion, postnasal drip, rhinorrhea and sore throat. Negative for sinus pressure.    Eyes: Negative.    Respiratory: Positive for cough, shortness of breath and wheezing.    Cardiovascular: Negative.    Gastrointestinal: Negative.    Neurological: Negative.    Psychiatric/Behavioral: Positive for sleep disturbance.       Objective   Vitals:    06/28/21 1157   BP: 140/60   Pulse: 87   Resp: 16   Temp: 97.1 °F (36.2 °C)   TempSrc: Temporal   SpO2: 97%   Weight: 106 kg (234 lb)   Height: 172.7 cm (68\")       BP Readings from Last 3 Encounters:   06/28/21 140/60   04/27/21 112/70   04/12/21 145/88        Wt Readings from Last 3 Encounters:   06/28/21 106 kg (234 lb)   04/26/21 112 kg (246 " lb 7.6 oz)   04/12/21 112 kg (246 lb 9.6 oz)        Body mass index is 35.58 kg/m².  Nursing notes and vitals reviewed.    Physical Exam  Vitals and nursing note reviewed.   Constitutional:       General: He is not in acute distress.     Appearance: Normal appearance. He is not ill-appearing or toxic-appearing.   HENT:      Head: Normocephalic and atraumatic.      Right Ear: Tympanic membrane normal.      Left Ear: Tympanic membrane normal.   Eyes:      General:         Right eye: No discharge.         Left eye: No discharge.      Extraocular Movements: Extraocular movements intact.      Conjunctiva/sclera: Conjunctivae normal.   Cardiovascular:      Rate and Rhythm: Normal rate and regular rhythm.   Pulmonary:      Effort: Pulmonary effort is normal.      Breath sounds: Normal breath sounds. No wheezing or rhonchi.      Comments: Coughing during exam.  Abdominal:      Palpations: Abdomen is soft.      Tenderness: There is no abdominal tenderness.   Musculoskeletal:      Cervical back: Neck supple. No rigidity.   Lymphadenopathy:      Cervical: No cervical adenopathy.   Skin:     Findings: No lesion or rash.   Neurological:      General: No focal deficit present.      Mental Status: He is alert and oriented to person, place, and time.   Psychiatric:         Mood and Affect: Mood normal.         Behavior: Behavior normal.         No results found for this or any previous visit (from the past 672 hour(s)).      Assessment/Plan   Diagnoses and all orders for this visit:    1. Bronchitis (Primary)  -     COVID-19,[facility] ROUTINE, NP/OP SWAB IN TRANSPORT MEDIA OR ESWAB 72 HR TAT - Swab, Nasopharynx  -     HYDROcodone-homatropine (Hycodan) 5-1.5 MG/5ML syrup; Take 5 mL by mouth Every 6 (Six) Hours As Needed for Cough.  Dispense: 120 mL; Refill: 0  -     benzonatate (TESSALON) 200 MG capsule; Take 1 capsule by mouth 3 (Three) Times a Day As Needed for Cough.  Dispense: 30 capsule; Refill: 0  -     albuterol sulfate   (90 Base) MCG/ACT inhaler; Inhale 2 puffs Every 4 (Four) Hours As Needed for Wheezing.  Dispense: 18 g; Refill: 0    Other orders  -     azithromycin (Zithromax Z-Gus) 250 MG tablet; Take 2 tablets by mouth on day 1, then 1 tablet daily on days 2-5  Dispense: 6 tablet; Refill: 0        Viral bronchitis.  Covid-19 test done.  Symptomatic treatment prescribed.  Warning s/sxs discussed.  The Z-Gus is to be started only if he is worsening or not improving after 7-10 days of illness.      Medications, including side effects, were discussed with the patient. Patient verbalized understanding.  The plan of care was discussed. All questions were answered. Patient verbalized understanding.      No follow-ups on file.

## 2021-06-29 LAB
LABCORP SARS-COV-2, NAA 2 DAY TAT: NORMAL
SARS-COV-2 RNA RESP QL NAA+PROBE: NOT DETECTED

## 2021-06-30 ENCOUNTER — TELEPHONE (OUTPATIENT)
Dept: INTERNAL MEDICINE | Facility: CLINIC | Age: 71
End: 2021-06-30

## 2021-07-07 RX ORDER — AMOXICILLIN 875 MG/1
875 TABLET, COATED ORAL 2 TIMES DAILY
Qty: 14 TABLET | Refills: 0 | Status: SHIPPED | OUTPATIENT
Start: 2021-07-07 | End: 2021-07-14

## 2021-07-22 ENCOUNTER — TELEPHONE (OUTPATIENT)
Dept: INTERNAL MEDICINE | Facility: CLINIC | Age: 71
End: 2021-07-22

## 2021-07-26 DIAGNOSIS — Z00.00 ROUTINE HEALTH MAINTENANCE: ICD-10-CM

## 2021-07-26 DIAGNOSIS — R73.03 PREDIABETES: Primary | ICD-10-CM

## 2021-08-02 RX ORDER — AMOXICILLIN 875 MG/1
TABLET, COATED ORAL
Qty: 14 TABLET | Refills: 0 | OUTPATIENT
Start: 2021-08-02

## 2021-08-05 ENCOUNTER — OFFICE VISIT (OUTPATIENT)
Dept: INTERNAL MEDICINE | Facility: CLINIC | Age: 71
End: 2021-08-05

## 2021-08-05 VITALS
HEART RATE: 54 BPM | OXYGEN SATURATION: 96 % | DIASTOLIC BLOOD PRESSURE: 60 MMHG | WEIGHT: 232.8 LBS | HEIGHT: 68 IN | SYSTOLIC BLOOD PRESSURE: 140 MMHG | BODY MASS INDEX: 35.28 KG/M2 | RESPIRATION RATE: 18 BRPM | TEMPERATURE: 97.3 F

## 2021-08-05 DIAGNOSIS — E29.1 HYPOGONADISM IN MALE: ICD-10-CM

## 2021-08-05 DIAGNOSIS — Z00.00 ROUTINE HEALTH MAINTENANCE: ICD-10-CM

## 2021-08-05 DIAGNOSIS — F52.21 ED (ERECTILE DYSFUNCTION) OF NON-ORGANIC ORIGIN: ICD-10-CM

## 2021-08-05 DIAGNOSIS — Z85.828 HISTORY OF BASAL CELL CARCINOMA: ICD-10-CM

## 2021-08-05 DIAGNOSIS — R73.03 PREDIABETES: ICD-10-CM

## 2021-08-05 DIAGNOSIS — E78.5 HYPERLIPIDEMIA, UNSPECIFIED HYPERLIPIDEMIA TYPE: Primary | ICD-10-CM

## 2021-08-05 DIAGNOSIS — I47.1 SUPRAVENTRICULAR TACHYCARDIA (HCC): ICD-10-CM

## 2021-08-05 DIAGNOSIS — N40.1 BENIGN PROSTATIC HYPERPLASIA WITH LOWER URINARY TRACT SYMPTOMS, SYMPTOM DETAILS UNSPECIFIED: ICD-10-CM

## 2021-08-05 DIAGNOSIS — D36.9 ADENOMATOUS POLYP: ICD-10-CM

## 2021-08-05 PROCEDURE — 99214 OFFICE O/P EST MOD 30 MIN: CPT | Performed by: FAMILY MEDICINE

## 2021-08-05 RX ORDER — SIMVASTATIN 40 MG
40 TABLET ORAL DAILY
Qty: 90 TABLET | Refills: 3 | Status: SHIPPED | OUTPATIENT
Start: 2021-08-05 | End: 2022-08-15 | Stop reason: SDUPTHER

## 2021-08-05 NOTE — PROGRESS NOTES
Subjective     Berhane Chaidez is a 71 y.o. male, who presents with a chief complaint of   Chief Complaint   Patient presents with   • Hyperlipidemia     Hyperlipidemia    Hypertension    Diabetes    1. Hyperlipidemia.  Pt tolerating simvastatin.  He exercises regularly and tries to watch his diet.    2. Prediabetes.  He was previously on metformin but now controls this with lifestyle measures.     3. SVT.  He still has a loop recorder and is on bisoprolol and flecainide.  He reports that he is feeling well.  Denies palpitations.  He sees Dr. Morgan.    The following portions of the patient's history were reviewed and updated as appropriate: allergies, current medications, past family history, past medical history, past social history, past surgical history and problem list.    Allergies: Patient has no known allergies.    Review of Systems   Constitutional: Negative.    HENT: Negative.    Eyes: Negative.    Respiratory: Negative.    Cardiovascular: Negative.    Gastrointestinal: Negative.    Endocrine: Negative.    Genitourinary: Negative.    Musculoskeletal: Negative.    Skin: Negative.    Allergic/Immunologic: Negative.    Neurological: Negative.    Hematological: Negative.    Psychiatric/Behavioral: Negative.      Objective     Wt Readings from Last 3 Encounters:   08/05/21 106 kg (232 lb 12.8 oz)   06/28/21 106 kg (234 lb)   04/26/21 112 kg (246 lb 7.6 oz)     Temp Readings from Last 3 Encounters:   08/05/21 97.3 °F (36.3 °C)   06/28/21 97.1 °F (36.2 °C) (Temporal)   04/27/21 98.2 °F (36.8 °C) (Oral)     BP Readings from Last 3 Encounters:   08/05/21 140/60   06/28/21 140/60   04/27/21 112/70     Pulse Readings from Last 3 Encounters:   08/05/21 54   06/28/21 87   04/27/21 67     Body mass index is 35.41 kg/m².  SpO2 Readings from Last 3 Encounters:   01/04/18 98%   06/29/17 94%   05/23/17 97%       Physical Exam   Constitutional: He is oriented to person, place, and time. He appears well-developed.   HENT:    Head: Normocephalic and atraumatic.   Mouth/Throat: Mucous membranes are moist.   Eyes: Conjunctivae are normal.   Neck: No thyromegaly present.   Cardiovascular: Normal rate, regular rhythm and normal heart sounds.   No murmur heard.  Pulmonary/Chest: Effort normal and breath sounds normal.   Abdominal: Soft. Normal appearance. There is no abdominal tenderness.   Musculoskeletal:      Right lower leg: No edema.      Left lower leg: No edema.   Neurological: He is alert and oriented to person, place, and time.   Skin: Skin is warm and dry.   Psychiatric: His behavior is normal. Mood normal.   Nursing note and vitals reviewed.    Assessment/Plan   Diagnoses and all orders for this visit:    1. Hyperlipidemia, unspecified hyperlipidemia type (Primary)  -     simvastatin (ZOCOR) 40 MG tablet; Take 1 tablet by mouth Daily. AT BEDTIME  Dispense: 90 tablet; Refill: 3  -     Comprehensive Metabolic Panel; Future  -     TSH; Future  -     Lipid Panel With / Chol / HDL Ratio; Future    2. Prediabetes  -     Hemoglobin A1c; Future  -     Vitamin B12; Future    3. Benign prostatic hyperplasia with lower urinary tract symptoms, symptom details unspecified    4. Hypogonadism in male  -     CBC & Differential; Future    5. History of basal cell carcinoma    6. Supraventricular tachycardia (CMS/HCC)    7. Adenomatous polyp    8. ED (erectile dysfunction) of non-organic origin    9. Routine health maintenance    1. Hyperlipidemia.  Well-controlled.  Continue simvastatin and lifestyle measures.    2. Prediabetes.  A1c 5.9, up from 5.7.  Stay off metformin and continue lifestyle measures.     3. BPH.  Symptoms controlled.  Per Dr. Dumont.    4. Hypogonadism.  Has Testopel implants per Dr. Dumont.    5. Hx BCC.  Followed every 6 months by Dr. Donohue.    6. SVT.  He sees Dr. Morgan.  He has a loop recorder in place and is on bisoprolol and flecainide.  Doing well.    7. History of colon polyps.  Colonoscopy 3/2019 showed tubular  "adenomas.  Dr. Dai recommended repeat in 3 years.     9. Routine health maint.  Pneumococcal vaccines UTD.  Had Shingrix at pharmacy.  AAA screening u/s wasn't done but he had CT scan 2016 with \"normal caliber aorta.\"    10.  E. D. Controlled with sildenafil per Dr. Dumont.    Outpatient Medications Prior to Visit   Medication Sig Dispense Refill   • bisoprolol (ZEBeta) 5 MG tablet TAKE ONE TABLET BY MOUTH DAILY     • Boswellia-Glucosamine-Vit D (GLUCOSAMINE COMPLEX PO) Take 1,100 mg by mouth Daily. HOLD ONE WEEK PRIOR TO SURGERY     • desonide (DesOwen) 0.05 % lotion Apply  topically to the appropriate area as directed 2 (Two) Times a Day. (Patient taking differently: Apply 1 application topically to the appropriate area as directed 2 (Two) Times a Day.) 118 mL 0   • flecainide (TAMBOCOR) 50 MG tablet Take 50 mg by mouth 2 (Two) Times a Day.     • Garlic 1000 MG capsule Take 1,000 mg by mouth Daily. HOLD ONE WEEK PRIOR TO SURGERY     • L-Lysine 1000 MG tablet Take 1,000 mg by mouth Daily. HOLD ONE WEEK PRIOR TO SURGERY     • lisinopril (PRINIVIL,ZESTRIL) 10 MG tablet Take 10 mg by mouth Daily.     • sildenafil (REVATIO) 20 MG tablet Take 20 mg by mouth As Needed. HOLD FOR 3 DAYS PRIOR TO SURGERY     • tamsulosin (FLOMAX) 0.4 MG capsule 24 hr capsule TAKE ONE CAPSULE BY MOUTH DAILY (Patient taking differently: Take 1 capsule by mouth Every Night.) 90 capsule 2   • Testosterone (TESTOPEL IL) by Implant route. Injections Twice a Year.     • vitamin D3 125 MCG (5000 UT) capsule capsule Take 5,000 Units by mouth Daily. HOLD ONE WEEK PRIOR TO SURGERY     • simvastatin (ZOCOR) 40 MG tablet Take 1 tablet by mouth Daily. AT BEDTIME (Patient taking differently: Take 40 mg by mouth Every Night. AT BEDTIME) 90 tablet 3   • albuterol sulfate  (90 Base) MCG/ACT inhaler Inhale 2 puffs Every 4 (Four) Hours As Needed for Wheezing. 18 g 0   • azithromycin (Zithromax Z-Gus) 250 MG tablet Take 2 tablets by mouth on day 1, " then 1 tablet daily on days 2-5 6 tablet 0   • benzonatate (TESSALON) 200 MG capsule Take 1 capsule by mouth 3 (Three) Times a Day As Needed for Cough. 30 capsule 0   • HYDROcodone-homatropine (Hycodan) 5-1.5 MG/5ML syrup Take 5 mL by mouth Every 6 (Six) Hours As Needed for Cough. 120 mL 0   • sulfamethoxazole-trimethoprim (BACTRIM DS,SEPTRA DS) 800-160 MG per tablet        No facility-administered medications prior to visit.     New Medications Ordered This Visit   Medications   • simvastatin (ZOCOR) 40 MG tablet     Sig: Take 1 tablet by mouth Daily. AT BEDTIME     Dispense:  90 tablet     Refill:  3     [unfilled]  Medications Discontinued During This Encounter   Medication Reason   • albuterol sulfate  (90 Base) MCG/ACT inhaler    • benzonatate (TESSALON) 200 MG capsule    • HYDROcodone-homatropine (Hycodan) 5-1.5 MG/5ML syrup    • azithromycin (Zithromax Z-Gus) 250 MG tablet    • sulfamethoxazole-trimethoprim (BACTRIM DS,SEPTRA DS) 800-160 MG per tablet    • simvastatin (ZOCOR) 40 MG tablet Reorder       Return in about 6 months (around 2/5/2022).

## 2021-12-28 DIAGNOSIS — N40.1 BENIGN NON-NODULAR PROSTATIC HYPERPLASIA WITH LOWER URINARY TRACT SYMPTOMS: ICD-10-CM

## 2021-12-28 RX ORDER — TAMSULOSIN HYDROCHLORIDE 0.4 MG/1
1 CAPSULE ORAL DAILY
Qty: 90 CAPSULE | Refills: 2 | Status: SHIPPED | OUTPATIENT
Start: 2021-12-28 | End: 2022-09-27 | Stop reason: SDUPTHER

## 2022-02-10 ENCOUNTER — OFFICE VISIT (OUTPATIENT)
Dept: INTERNAL MEDICINE | Facility: CLINIC | Age: 72
End: 2022-02-10

## 2022-02-10 VITALS
SYSTOLIC BLOOD PRESSURE: 142 MMHG | HEART RATE: 59 BPM | WEIGHT: 242.4 LBS | TEMPERATURE: 97.8 F | RESPIRATION RATE: 18 BRPM | DIASTOLIC BLOOD PRESSURE: 70 MMHG | HEIGHT: 68 IN | OXYGEN SATURATION: 97 % | BODY MASS INDEX: 36.74 KG/M2

## 2022-02-10 DIAGNOSIS — E29.1 HYPOGONADISM IN MALE: ICD-10-CM

## 2022-02-10 DIAGNOSIS — Z85.828 HISTORY OF BASAL CELL CARCINOMA: ICD-10-CM

## 2022-02-10 DIAGNOSIS — Z00.00 ROUTINE HEALTH MAINTENANCE: ICD-10-CM

## 2022-02-10 DIAGNOSIS — I47.1 SUPRAVENTRICULAR TACHYCARDIA: ICD-10-CM

## 2022-02-10 DIAGNOSIS — D75.1 POLYCYTHEMIA: ICD-10-CM

## 2022-02-10 DIAGNOSIS — R73.03 PREDIABETES: ICD-10-CM

## 2022-02-10 DIAGNOSIS — E78.5 HYPERLIPIDEMIA, UNSPECIFIED HYPERLIPIDEMIA TYPE: Primary | ICD-10-CM

## 2022-02-10 DIAGNOSIS — F52.21 ED (ERECTILE DYSFUNCTION) OF NON-ORGANIC ORIGIN: ICD-10-CM

## 2022-02-10 DIAGNOSIS — Z86.010 HISTORY OF COLON POLYPS: ICD-10-CM

## 2022-02-10 DIAGNOSIS — N40.1 BENIGN PROSTATIC HYPERPLASIA WITH LOWER URINARY TRACT SYMPTOMS, SYMPTOM DETAILS UNSPECIFIED: ICD-10-CM

## 2022-02-10 PROCEDURE — 99214 OFFICE O/P EST MOD 30 MIN: CPT | Performed by: FAMILY MEDICINE

## 2022-02-10 NOTE — PROGRESS NOTES
Subjective     Berhane Chaidez is a 71 y.o. male, who presents with a chief complaint of   Chief Complaint   Patient presents with   • Hyperlipidemia     Hyperlipidemia    Hypertension    Diabetes    1. Hyperlipidemia.  Pt tolerating simvastatin.  He exercises regularly and tries to watch his diet.    2. Prediabetes.  He was previously on metformin but now controls this with lifestyle measures.     3. SVT.  He is on bisoprolol and flecainide.  He reports that he is feeling well.  Denies palpitations.  He sees Dr. Morgan.  He is having the loop recorder moved next week.    The following portions of the patient's history were reviewed and updated as appropriate: allergies, current medications, past family history, past medical history, past social history, past surgical history and problem list.    Allergies: Patient has no known allergies.    Review of Systems   Constitutional: Negative.    HENT: Negative.    Eyes: Negative.    Respiratory: Negative.    Cardiovascular: Negative.    Gastrointestinal: Negative.    Endocrine: Negative.    Genitourinary: Negative.    Musculoskeletal: Negative.    Skin: Negative.    Allergic/Immunologic: Negative.    Neurological: Negative.    Hematological: Negative.    Psychiatric/Behavioral: Negative.      Objective     Wt Readings from Last 3 Encounters:   02/10/22 110 kg (242 lb 6.4 oz)   08/05/21 106 kg (232 lb 12.8 oz)   06/28/21 106 kg (234 lb)     Temp Readings from Last 3 Encounters:   02/10/22 97.8 °F (36.6 °C)   08/05/21 97.3 °F (36.3 °C)   06/28/21 97.1 °F (36.2 °C) (Temporal)     BP Readings from Last 3 Encounters:   02/10/22 142/70   08/05/21 140/60   06/28/21 140/60     Pulse Readings from Last 3 Encounters:   02/10/22 59   08/05/21 54   06/28/21 87     Body mass index is 36.87 kg/m².  SpO2 Readings from Last 3 Encounters:   01/04/18 98%   06/29/17 94%   05/23/17 97%       Physical Exam   Constitutional: He is oriented to person, place, and time. He appears well-developed.    HENT:   Head: Normocephalic and atraumatic.   Mouth/Throat: Mucous membranes are moist.   Eyes: Conjunctivae are normal.   Neck: No thyromegaly present.   Cardiovascular: Normal rate, regular rhythm and normal heart sounds.   No murmur heard.  Pulmonary/Chest: Effort normal and breath sounds normal.   Abdominal: Soft. Normal appearance. There is no abdominal tenderness.   Musculoskeletal:      Right lower leg: No edema.      Left lower leg: No edema.   Neurological: He is alert and oriented to person, place, and time.   Skin: Skin is warm and dry.   Psychiatric: His behavior is normal. Mood normal.   Nursing note and vitals reviewed.    Assessment/Plan   Diagnoses and all orders for this visit:    1. Hyperlipidemia, unspecified hyperlipidemia type (Primary)    2. Prediabetes    3. Benign prostatic hyperplasia with lower urinary tract symptoms, symptom details unspecified    4. Hypogonadism in male    5. History of basal cell carcinoma    6. Supraventricular tachycardia (HCC)    7. History of colon polyps  -     Ambulatory Referral For Screening Colonoscopy    8. ED (erectile dysfunction) of non-organic origin    9. Routine health maintenance    10. Polycythemia  -     CBC w AUTO Differential; Future    1. Hyperlipidemia.  Well-controlled.  Continue simvastatin and lifestyle measures.    2. Prediabetes.  A1c stable at 5.9.  Stay off metformin and continue lifestyle measures.     3. BPH.  Symptoms controlled.  Per Dr. Dumont.    4. Hypogonadism.  Has Testopel implants per Dr. Dumont.    5. Hx BCC.  Followed every 6 months by Dr. Donohue.    6. SVT.  He sees Dr. Morgan.  He has a loop recorder in place, to be removed next week, and is on bisoprolol and flecainide.  Doing well.    7. History of colon polyps.  Colonoscopy 3/2019 showed tubular adenomas.  Dr. Dai recommended repeat in 3 years and this is ordered.    8. E. D. Controlled with sildenafil per Dr. Dumont.    9. Routine health maint.  Pneumococcal  "vaccines UTD.  Had Shingrix at pharmacy.  AAA screening u/s wasn't done but he had CT scan 2016 with \"normal caliber aorta.\"    10. Polycythemia.  Likely d/t testosterone replacement.  Check CBC before next Testopel placement.        Outpatient Medications Prior to Visit   Medication Sig Dispense Refill   • bisoprolol (ZEBeta) 5 MG tablet TAKE ONE TABLET BY MOUTH DAILY     • Boswellia-Glucosamine-Vit D (GLUCOSAMINE COMPLEX PO) Take 1,100 mg by mouth Daily. HOLD ONE WEEK PRIOR TO SURGERY     • desonide (DesOwen) 0.05 % lotion Apply  topically to the appropriate area as directed 2 (Two) Times a Day. (Patient taking differently: Apply 1 application topically to the appropriate area as directed 2 (Two) Times a Day.) 118 mL 0   • flecainide (TAMBOCOR) 50 MG tablet Take 50 mg by mouth 2 (Two) Times a Day.     • Garlic 1000 MG capsule Take 1,000 mg by mouth Daily. HOLD ONE WEEK PRIOR TO SURGERY     • L-Lysine 1000 MG tablet Take 1,000 mg by mouth Daily. HOLD ONE WEEK PRIOR TO SURGERY     • lisinopril (PRINIVIL,ZESTRIL) 10 MG tablet Take 10 mg by mouth Daily.     • sildenafil (REVATIO) 20 MG tablet Take 20 mg by mouth As Needed. HOLD FOR 3 DAYS PRIOR TO SURGERY     • simvastatin (ZOCOR) 40 MG tablet Take 1 tablet by mouth Daily. AT BEDTIME 90 tablet 3   • tamsulosin (FLOMAX) 0.4 MG capsule 24 hr capsule Take 1 capsule by mouth Daily. 90 capsule 2   • Testosterone (TESTOPEL IL) by Implant route. Injections Twice a Year.     • vitamin D3 125 MCG (5000 UT) capsule capsule Take 5,000 Units by mouth Daily. HOLD ONE WEEK PRIOR TO SURGERY       No facility-administered medications prior to visit.     No orders of the defined types were placed in this encounter.    [unfilled]  There are no discontinued medications.    Return in about 6 months (around 8/10/2022).        "

## 2022-02-14 ENCOUNTER — PRE-PROCEDURE SCREENING (OUTPATIENT)
Dept: GASTROENTEROLOGY | Facility: CLINIC | Age: 72
End: 2022-02-14

## 2022-02-14 NOTE — TELEPHONE ENCOUNTER
Last scope 3/6/19 in epic-- Personal history of polyps--No family history of polyps or colon cancer-- Aspirin--Medications:          bisoprolol (ZEBeta) 5 MG tablet  Boswellia-Glucosamine-Vit D (GLUCOSAMINE COMPLEX PO)  desonide (DesOwen) 0.05 % lotion  flecainide (TAMBOCOR) 50 MG tablet  Garlic 1000 MG capsule  L-Lysine 1000 MG tablet  lisinopril (PRINIVIL,ZESTRIL) 10 MG tablet  sildenafil (REVATIO) 20 MG tablet  simvastatin (ZOCOR) 40 MG tablet  tamsulosin (FLOMAX) 0.4 MG capsule 24 hr capsule  Testosterone (TESTOPEL IL)  vitamin D3 125 MCG (5000 UT) capsule capsule  Aspirin     Pt verbalized and understood that it would be few weeks before been schedule

## 2022-02-15 ENCOUNTER — PREP FOR SURGERY (OUTPATIENT)
Dept: OTHER | Facility: HOSPITAL | Age: 72
End: 2022-02-15

## 2022-02-15 DIAGNOSIS — Z86.010 HISTORY OF ADENOMATOUS POLYP OF COLON: Primary | ICD-10-CM

## 2022-03-04 ENCOUNTER — TELEPHONE (OUTPATIENT)
Dept: INTERNAL MEDICINE | Facility: CLINIC | Age: 72
End: 2022-03-04

## 2022-03-23 ENCOUNTER — TELEPHONE (OUTPATIENT)
Dept: INTERNAL MEDICINE | Facility: CLINIC | Age: 72
End: 2022-03-23

## 2022-03-23 NOTE — TELEPHONE ENCOUNTER
----- Message from Aung Britt MD sent at 3/21/2022  5:49 PM EDT -----  The blood counts are improved this time.

## 2022-06-08 ENCOUNTER — TELEPHONE (OUTPATIENT)
Dept: INTERNAL MEDICINE | Facility: CLINIC | Age: 72
End: 2022-06-08

## 2022-06-08 NOTE — TELEPHONE ENCOUNTER
Caller: Berhane Chaidez    Relationship to patient: Self    Best call back number: [phone]    Patient is needing: WANTS TO SCHEDULE TELEHEALTH VISIT TO GET COVID INFUSION SET UP.   HUB ATTEMPTED WARM TRANSFER, UNSUCCESSFUL     PLEASE ADVISE           
Called and spoke with patient and informed him of what dr. Britt had said he stated his understanding but stated he would have to think about it before he had anything done.   
Caller: Berhane Chaidze    Relationship to patient: Self    Best call back number:[phone]     Date of exposure: UNKNOWN    Date of positive COVID19 test: 06/08/22 - HOME TEST - SYMPTOMS STARTED 06/06/22    COVID19 symptoms: HEADACHE / DIARRHEA / FEVER / LOSS OF TASTE / CONGESTION / COUGH    Date of initial quarantine:     Additional information or concerns: PATIENT CALLING WANTING TO KNOW WHAT THE PROTOCOL IS AND IF SOMETHING COULD BE CALLED IN     What is the patients preferred pharmacy: [facility] [address] [place] [address] [address] - [phone]  - [phone]   [phone]          
Ok to schedule for a video visit so he can get this?  
Order is faxed and patient is scheduled.   
What would you suggest for the patient?   
none

## 2022-06-09 ENCOUNTER — TELEMEDICINE (OUTPATIENT)
Dept: INTERNAL MEDICINE | Facility: CLINIC | Age: 72
End: 2022-06-09

## 2022-06-09 VITALS
DIASTOLIC BLOOD PRESSURE: 84 MMHG | TEMPERATURE: 97.8 F | HEIGHT: 68 IN | SYSTOLIC BLOOD PRESSURE: 146 MMHG | BODY MASS INDEX: 35.77 KG/M2 | WEIGHT: 236 LBS | HEART RATE: 78 BPM

## 2022-06-09 DIAGNOSIS — K62.5 RECTAL BLEEDING: Primary | ICD-10-CM

## 2022-06-09 DIAGNOSIS — J06.9 UPPER RESPIRATORY TRACT INFECTION DUE TO COVID-19 VIRUS: Primary | ICD-10-CM

## 2022-06-09 DIAGNOSIS — U07.1 UPPER RESPIRATORY TRACT INFECTION DUE TO COVID-19 VIRUS: Primary | ICD-10-CM

## 2022-06-09 PROCEDURE — 99213 OFFICE O/P EST LOW 20 MIN: CPT | Performed by: FAMILY MEDICINE

## 2022-06-09 RX ORDER — BENZONATATE 200 MG/1
200 CAPSULE ORAL 3 TIMES DAILY PRN
Qty: 30 CAPSULE | Refills: 0 | Status: SHIPPED | OUTPATIENT
Start: 2022-06-09 | End: 2022-06-30

## 2022-06-09 RX ORDER — HYDROCODONE BITARTRATE AND HOMATROPINE METHYLBROMIDE ORAL SOLUTION 5; 1.5 MG/5ML; MG/5ML
5 LIQUID ORAL EVERY 6 HOURS PRN
Qty: 120 ML | Refills: 0 | Status: SHIPPED | OUTPATIENT
Start: 2022-06-09 | End: 2022-06-30

## 2022-06-09 NOTE — PROGRESS NOTES
Subjective   Berhane Chaidez is a 72 y.o. male presenting today for follow up of   Chief Complaint   Patient presents with   • Covid-19 Home Monitoring Video Visit     Unable to complete visit using a video connection to the patient. A phone visit was used to complete this visits. Total time of discussion was 15 minutes.  You have chosen to receive care through a telephone visit. Do you consent to use a telephone visit for your medical care today? Yes    History of Present Illness     Pt presents to discuss Covid-19 infection.  He developed symptoms 3 days ago and had a positive home test yesterday.  Symptoms include cough, congestion, fever, fatigue, body aches.  He denies shortness of breath.  He has taken aspirin for symptom relief.  He received two Moderna Covid-19 vaccines 2/2021 and 3/2021.      Patient Active Problem List   Diagnosis   • History of colon polyps   • Prediabetes   • ED (erectile dysfunction) of non-organic origin   • Hyperlipidemia   • Hypogonadism in male   • BPH (benign prostatic hyperplasia)   • Osteoarthritis of both hips   • Routine health maintenance   • History of diverticulitis   • Osteoarthritis, knee   • Supraventricular tachycardia (HCC)   • History of basal cell carcinoma   • Onychomycosis   • Adenomatous polyp   • Infected sebaceous cyst of skin   • DJD (degenerative joint disease) of knee   • DJD (degenerative joint disease)       Current Outpatient Medications on File Prior to Visit   Medication Sig   • bisoprolol (ZEBeta) 5 MG tablet TAKE ONE TABLET BY MOUTH DAILY   • Boswellia-Glucosamine-Vit D (GLUCOSAMINE COMPLEX PO) Take 1,100 mg by mouth Daily. HOLD ONE WEEK PRIOR TO SURGERY   • desonide (DesOwen) 0.05 % lotion Apply  topically to the appropriate area as directed 2 (Two) Times a Day. (Patient taking differently: Apply 1 application topically to the appropriate area as directed 2 (Two) Times a Day.)   • flecainide (TAMBOCOR) 50 MG tablet Take 50 mg by mouth 2 (Two) Times a  "Day.   • Garlic 1000 MG capsule Take 1,000 mg by mouth Daily. HOLD ONE WEEK PRIOR TO SURGERY   • L-Lysine 1000 MG tablet Take 1,000 mg by mouth Daily. HOLD ONE WEEK PRIOR TO SURGERY   • lisinopril (PRINIVIL,ZESTRIL) 10 MG tablet Take 10 mg by mouth Daily.   • sildenafil (REVATIO) 20 MG tablet Take 20 mg by mouth As Needed. HOLD FOR 3 DAYS PRIOR TO SURGERY   • simvastatin (ZOCOR) 40 MG tablet Take 1 tablet by mouth Daily. AT BEDTIME   • tamsulosin (FLOMAX) 0.4 MG capsule 24 hr capsule Take 1 capsule by mouth Daily.   • Testosterone (TESTOPEL IL) by Implant route. Injections Twice a Year.   • vitamin D3 125 MCG (5000 UT) capsule capsule Take 5,000 Units by mouth Daily. HOLD ONE WEEK PRIOR TO SURGERY     No current facility-administered medications on file prior to visit.          The following portions of the patient's history were reviewed and updated as appropriate: allergies, current medications, past family history, past medical history, past social history, past surgical history and problem list.    Review of Systems   Constitutional: Positive for activity change, fatigue and fever.   HENT: Positive for congestion, postnasal drip, rhinorrhea and sore throat.    Respiratory: Positive for cough. Negative for shortness of breath and wheezing.    Cardiovascular: Negative.    Neurological: Positive for headache.       Objective   Vitals:    06/09/22 0834   BP: 146/84   Pulse: 78   Temp: 97.8 °F (36.6 °C)   Weight: 107 kg (236 lb)   Height: 172.7 cm (67.99\")       BP Readings from Last 3 Encounters:   06/09/22 146/84   02/10/22 142/70   08/05/21 140/60        Wt Readings from Last 3 Encounters:   06/09/22 107 kg (236 lb)   02/10/22 110 kg (242 lb 6.4 oz)   08/05/21 106 kg (232 lb 12.8 oz)        Body mass index is 35.89 kg/m².  Nursing notes and vitals reviewed.    Physical Exam  Constitutional:       General: He is not in acute distress.  Pulmonary:      Effort: Pulmonary effort is normal. No respiratory distress. "   Neurological:      General: No focal deficit present.      Mental Status: He is alert and oriented to person, place, and time.   Psychiatric:         Mood and Affect: Mood normal.         Behavior: Behavior normal.         No results found for this or any previous visit (from the past 672 hour(s)).      Assessment & Plan   Diagnoses and all orders for this visit:    1. Upper respiratory tract infection due to COVID-19 virus (Primary)  -     benzonatate (TESSALON) 200 MG capsule; Take 1 capsule by mouth 3 (Three) Times a Day As Needed for Cough.  Dispense: 30 capsule; Refill: 0  -     HYDROcodone Bit-Homatrop MBr (HYCODAN) 5-1.5 MG/5ML solution; Take 5 mL by mouth Every 6 (Six) Hours As Needed for Cough.  Dispense: 120 mL; Refill: 0      Options discussed.  Will refer patient for monoclonal antibody infusion.  Symptomatic treatment discussed.  Warning s/sxs and ER precautions discussed.    The FDA has issued an Emergency Use Authorization (EUA) to permit emergency use of the unapproved product bebelovimab for treatment of laboratory confirmed COVID-19 in certain ambulatory patients. There is no prescribing information or package insert, however, the FDA has authorized distribution of Fact Sheets for patients and/or caregivers for additional information on this investigational therapy. I have provided the Fact Sheet to and discussed the following with the patient and he/she agreed to proceed:  • FDA has authorized the emergency use (EUA) of bebtelovimab, which is not an FDA approved drug.  • The patient has the option to accept or refuse bebtelovimab at any time.  • The significant known and potential risks and benefits of bebtelovimab and the extent to which such risks and benefits are unknown.  • Information on available alternative treatments and the risks and benefits of those alternatives have been shared.          Medications, including side effects, were discussed with the patient. Patient verbalized  understanding.  The plan of care was discussed. All questions were answered. Patient verbalized understanding.      Return if symptoms worsen or fail to improve, for Next scheduled follow up.

## 2022-06-10 ENCOUNTER — TRANSCRIBE ORDERS (OUTPATIENT)
Dept: ADMINISTRATIVE | Facility: HOSPITAL | Age: 72
End: 2022-06-10

## 2022-06-10 ENCOUNTER — HOSPITAL ENCOUNTER (OUTPATIENT)
Dept: INFUSION THERAPY | Facility: HOSPITAL | Age: 72
Setting detail: INFUSION SERIES
Discharge: HOME | End: 2022-06-10

## 2022-06-10 VITALS
RESPIRATION RATE: 16 BRPM | SYSTOLIC BLOOD PRESSURE: 133 MMHG | TEMPERATURE: 97.7 F | OXYGEN SATURATION: 96 % | DIASTOLIC BLOOD PRESSURE: 73 MMHG | HEART RATE: 69 BPM

## 2022-06-10 DIAGNOSIS — U07.1 CLINICAL DIAGNOSIS OF SEVERE ACUTE RESPIRATORY SYNDROME CORONAVIRUS 2 (SARS-COV-2) DISEASE: Primary | ICD-10-CM

## 2022-06-10 DIAGNOSIS — U07.1 COVID: Primary | ICD-10-CM

## 2022-06-10 PROCEDURE — M0222 HC INJECTION BEBTELOVIMAB: HCPCS | Performed by: FAMILY MEDICINE

## 2022-06-10 PROCEDURE — 96374 THER/PROPH/DIAG INJ IV PUSH: CPT

## 2022-06-10 PROCEDURE — 25010000002 INJECTION, BEBTELOVIMAB, 175 MG: Performed by: FAMILY MEDICINE

## 2022-06-10 RX ORDER — METHYLPREDNISOLONE SODIUM SUCCINATE 125 MG/2ML
125 INJECTION, POWDER, LYOPHILIZED, FOR SOLUTION INTRAMUSCULAR; INTRAVENOUS AS NEEDED
Status: CANCELLED | OUTPATIENT
Start: 2022-06-10

## 2022-06-10 RX ORDER — SODIUM CHLORIDE 9 MG/ML
30 INJECTION, SOLUTION INTRAVENOUS ONCE
Status: DISCONTINUED | OUTPATIENT
Start: 2022-06-10 | End: 2022-06-12 | Stop reason: HOSPADM

## 2022-06-10 RX ORDER — EPINEPHRINE 1 MG/ML
0.3 INJECTION, SOLUTION, CONCENTRATE INTRAVENOUS AS NEEDED
Status: CANCELLED | OUTPATIENT
Start: 2022-06-10

## 2022-06-10 RX ORDER — DIPHENHYDRAMINE HCL 25 MG
50 CAPSULE ORAL ONCE AS NEEDED
Status: DISCONTINUED | OUTPATIENT
Start: 2022-06-10 | End: 2022-06-12 | Stop reason: HOSPADM

## 2022-06-10 RX ORDER — SODIUM CHLORIDE 9 MG/ML
30 INJECTION, SOLUTION INTRAVENOUS ONCE
Status: CANCELLED | OUTPATIENT
Start: 2022-06-10 | End: 2022-06-10

## 2022-06-10 RX ORDER — METHYLPREDNISOLONE SODIUM SUCCINATE 125 MG/2ML
125 INJECTION, POWDER, LYOPHILIZED, FOR SOLUTION INTRAMUSCULAR; INTRAVENOUS AS NEEDED
Status: DISCONTINUED | OUTPATIENT
Start: 2022-06-10 | End: 2022-06-12 | Stop reason: HOSPADM

## 2022-06-10 RX ORDER — METHYLPREDNISOLONE SODIUM SUCCINATE 125 MG/2ML
125 INJECTION, POWDER, LYOPHILIZED, FOR SOLUTION INTRAMUSCULAR; INTRAVENOUS AS NEEDED
OUTPATIENT
Start: 2022-06-10

## 2022-06-10 RX ORDER — DIPHENHYDRAMINE HCL 50 MG
50 CAPSULE ORAL ONCE AS NEEDED
Status: CANCELLED | OUTPATIENT
Start: 2022-06-10

## 2022-06-10 RX ORDER — DIPHENHYDRAMINE HYDROCHLORIDE 50 MG/ML
50 INJECTION INTRAMUSCULAR; INTRAVENOUS ONCE AS NEEDED
OUTPATIENT
Start: 2022-06-10

## 2022-06-10 RX ORDER — BEBTELOVIMAB 87.5 MG/ML
175 INJECTION, SOLUTION INTRAVENOUS ONCE
Status: COMPLETED | OUTPATIENT
Start: 2022-06-10 | End: 2022-06-10

## 2022-06-10 RX ORDER — DIPHENHYDRAMINE HYDROCHLORIDE 50 MG/ML
50 INJECTION INTRAMUSCULAR; INTRAVENOUS ONCE AS NEEDED
Status: DISCONTINUED | OUTPATIENT
Start: 2022-06-10 | End: 2022-06-12 | Stop reason: HOSPADM

## 2022-06-10 RX ORDER — DIPHENHYDRAMINE HYDROCHLORIDE 50 MG/ML
50 INJECTION INTRAMUSCULAR; INTRAVENOUS ONCE AS NEEDED
Status: CANCELLED | OUTPATIENT
Start: 2022-06-10

## 2022-06-10 RX ORDER — BEBTELOVIMAB 87.5 MG/ML
175 INJECTION, SOLUTION INTRAVENOUS ONCE
Status: CANCELLED | OUTPATIENT
Start: 2022-06-10

## 2022-06-10 RX ORDER — DIPHENHYDRAMINE HCL 25 MG
50 CAPSULE ORAL ONCE AS NEEDED
OUTPATIENT
Start: 2022-06-10

## 2022-06-10 RX ADMIN — BEBTELOVIMAB 175 MG: 87.5 INJECTION, SOLUTION INTRAVENOUS at 17:23

## 2022-06-23 PROBLEM — G47.33 OBSTRUCTIVE SLEEP APNEA: Status: ACTIVE | Noted: 2020-09-03

## 2022-06-23 PROBLEM — I10 PRIMARY HYPERTENSION: Status: ACTIVE | Noted: 2019-04-23

## 2022-06-23 PROBLEM — I47.1 ATRIAL TACHYCARDIA (HCC): Status: ACTIVE | Noted: 2017-06-29

## 2022-06-23 PROBLEM — I47.19 ATRIAL TACHYCARDIA: Status: ACTIVE | Noted: 2017-06-29

## 2022-06-23 PROBLEM — K42.0 INCARCERATED UMBILICAL HERNIA: Status: ACTIVE | Noted: 2022-06-23

## 2022-06-23 PROBLEM — I25.10 CORONARY ARTERY CALCIFICATION SEEN ON COMPUTED TOMOGRAPHY: Status: ACTIVE | Noted: 2017-10-17

## 2022-06-23 PROBLEM — Z91.89 STROKE RISK: Status: ACTIVE | Noted: 2022-06-23

## 2022-06-23 PROBLEM — R07.9 CHEST PAIN: Status: ACTIVE | Noted: 2017-10-17

## 2022-06-23 PROBLEM — R00.2 PALPITATIONS: Status: ACTIVE | Noted: 2017-11-20

## 2022-06-23 PROBLEM — R94.31 ABNORMAL EKG: Status: ACTIVE | Noted: 2019-04-23

## 2022-06-23 PROBLEM — I47.29 NONSUSTAINED VENTRICULAR TACHYCARDIA: Status: ACTIVE | Noted: 2020-04-21

## 2022-06-23 PROBLEM — E11.9 DIABETES MELLITUS (HCC): Status: ACTIVE | Noted: 2022-06-23

## 2022-06-24 ENCOUNTER — TELEPHONE (OUTPATIENT)
Dept: INTERNAL MEDICINE | Facility: CLINIC | Age: 72
End: 2022-06-24

## 2022-06-24 NOTE — TELEPHONE ENCOUNTER
LVM for pt to let him know that he is due for his AWV. Offered to adjust time of appt in Aug to make it an AWV if pt would like.    Hub to share.

## 2022-06-30 ENCOUNTER — OFFICE VISIT (OUTPATIENT)
Dept: SURGERY | Facility: CLINIC | Age: 72
End: 2022-06-30

## 2022-06-30 VITALS
HEIGHT: 69 IN | DIASTOLIC BLOOD PRESSURE: 78 MMHG | HEART RATE: 80 BPM | SYSTOLIC BLOOD PRESSURE: 142 MMHG | WEIGHT: 233.4 LBS | BODY MASS INDEX: 34.57 KG/M2 | OXYGEN SATURATION: 95 %

## 2022-06-30 DIAGNOSIS — K64.4 EXTERNAL HEMORRHOIDS WITH COMPLICATION: ICD-10-CM

## 2022-06-30 DIAGNOSIS — K64.8 INTERNAL HEMORRHOIDS WITH COMPLICATION: Primary | ICD-10-CM

## 2022-06-30 PROCEDURE — 99204 OFFICE O/P NEW MOD 45 MIN: CPT | Performed by: PHYSICIAN ASSISTANT

## 2022-06-30 RX ORDER — ASPIRIN 81 MG/1
81 TABLET ORAL DAILY
COMMUNITY

## 2022-06-30 RX ORDER — CYANOCOBALAMIN (VITAMIN B-12) 500 MCG
1000 TABLET ORAL DAILY
COMMUNITY

## 2022-06-30 RX ORDER — HYDROCORTISONE 25 MG/G
CREAM TOPICAL
Qty: 30 G | Refills: 1 | Status: SHIPPED | OUTPATIENT
Start: 2022-06-30 | End: 2023-02-22
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
## 2022-07-11 RX ORDER — DIAPER,BRIEF,INFANT-TODD,DISP
EACH MISCELLANEOUS
Qty: 30 G | Refills: 1 | Status: SHIPPED | OUTPATIENT
Start: 2022-07-11 | End: 2022-07-25

## 2022-08-09 ENCOUNTER — TELEPHONE (OUTPATIENT)
Dept: INTERNAL MEDICINE | Facility: CLINIC | Age: 72
End: 2022-08-09

## 2022-08-09 ENCOUNTER — TRANSCRIBE ORDERS (OUTPATIENT)
Dept: ADMINISTRATIVE | Facility: HOSPITAL | Age: 72
End: 2022-08-09

## 2022-08-09 DIAGNOSIS — R73.03 PREDIABETES: ICD-10-CM

## 2022-08-09 DIAGNOSIS — I10 PRIMARY HYPERTENSION: ICD-10-CM

## 2022-08-09 DIAGNOSIS — E78.5 HYPERLIPIDEMIA, UNSPECIFIED HYPERLIPIDEMIA TYPE: Primary | ICD-10-CM

## 2022-08-09 DIAGNOSIS — N40.1 BENIGN PROSTATIC HYPERPLASIA WITH LOWER URINARY TRACT SYMPTOMS, SYMPTOM DETAILS UNSPECIFIED: ICD-10-CM

## 2022-08-09 DIAGNOSIS — T84.032A MECHANICAL LOOSENING OF INTERNAL RIGHT KNEE PROSTHETIC JOINT, INITIAL ENCOUNTER: Primary | ICD-10-CM

## 2022-08-09 DIAGNOSIS — F52.21 ED (ERECTILE DYSFUNCTION) OF NON-ORGANIC ORIGIN: ICD-10-CM

## 2022-08-09 DIAGNOSIS — I47.1 ATRIAL TACHYCARDIA: ICD-10-CM

## 2022-08-09 DIAGNOSIS — E78.5 HYPERLIPIDEMIA, UNSPECIFIED HYPERLIPIDEMIA TYPE: ICD-10-CM

## 2022-08-09 DIAGNOSIS — Z12.5 PROSTATE CANCER SCREENING: ICD-10-CM

## 2022-08-10 ENCOUNTER — ANESTHESIA (OUTPATIENT)
Dept: GASTROENTEROLOGY | Facility: HOSPITAL | Age: 72
End: 2022-08-10

## 2022-08-10 ENCOUNTER — HOSPITAL ENCOUNTER (OUTPATIENT)
Facility: HOSPITAL | Age: 72
Setting detail: HOSPITAL OUTPATIENT SURGERY
Discharge: HOME | End: 2022-08-10
Attending: INTERNAL MEDICINE | Admitting: INTERNAL MEDICINE

## 2022-08-10 ENCOUNTER — ANESTHESIA EVENT (OUTPATIENT)
Dept: GASTROENTEROLOGY | Facility: HOSPITAL | Age: 72
End: 2022-08-10

## 2022-08-10 VITALS
WEIGHT: 234 LBS | BODY MASS INDEX: 34.66 KG/M2 | SYSTOLIC BLOOD PRESSURE: 132 MMHG | HEART RATE: 61 BPM | OXYGEN SATURATION: 95 % | TEMPERATURE: 98.2 F | RESPIRATION RATE: 17 BRPM | DIASTOLIC BLOOD PRESSURE: 71 MMHG | HEIGHT: 69 IN

## 2022-08-10 DIAGNOSIS — Z86.010 HISTORY OF ADENOMATOUS POLYP OF COLON: ICD-10-CM

## 2022-08-10 LAB
ALBUMIN SERPL-MCNC: 4.1 G/DL (ref 3.7–4.7)
ALBUMIN/GLOB SERPL: 2.1 {RATIO} (ref 1.2–2.2)
ALP SERPL-CCNC: 68 IU/L (ref 44–121)
ALT SERPL-CCNC: 22 IU/L (ref 0–44)
AST SERPL-CCNC: 19 IU/L (ref 0–40)
BASOPHILS # BLD AUTO: 0.1 X10E3/UL (ref 0–0.2)
BASOPHILS NFR BLD AUTO: 1 %
BILIRUB SERPL-MCNC: 0.4 MG/DL (ref 0–1.2)
BUN SERPL-MCNC: 14 MG/DL (ref 8–27)
BUN/CREAT SERPL: 13 (ref 10–24)
CALCIUM SERPL-MCNC: 9.5 MG/DL (ref 8.6–10.2)
CHLORIDE SERPL-SCNC: 100 MMOL/L (ref 96–106)
CHOLEST SERPL-MCNC: 160 MG/DL (ref 100–199)
CO2 SERPL-SCNC: 25 MMOL/L (ref 20–29)
CREAT SERPL-MCNC: 1.11 MG/DL (ref 0.76–1.27)
EGFRCR SERPLBLD CKD-EPI 2021: 70 ML/MIN/1.73
EOSINOPHIL # BLD AUTO: 0.2 X10E3/UL (ref 0–0.4)
EOSINOPHIL NFR BLD AUTO: 3 %
ERYTHROCYTE [DISTWIDTH] IN BLOOD BY AUTOMATED COUNT: 14.1 % (ref 11.6–15.4)
GLOBULIN SER CALC-MCNC: 2 G/DL (ref 1.5–4.5)
GLUCOSE SERPL-MCNC: 101 MG/DL (ref 65–99)
HBA1C MFR BLD: 6.2 % (ref 4.8–5.6)
HCT VFR BLD AUTO: 48.7 % (ref 37.5–51)
HDLC SERPL-MCNC: 52 MG/DL
HGB BLD-MCNC: 16.6 G/DL (ref 13–17.7)
IMM GRANULOCYTES # BLD AUTO: 0 X10E3/UL (ref 0–0.1)
IMM GRANULOCYTES NFR BLD AUTO: 1 %
LDLC SERPL CALC-MCNC: 91 MG/DL (ref 0–99)
LDLC/HDLC SERPL: 1.8 RATIO (ref 0–3.6)
LYMPHOCYTES # BLD AUTO: 1 X10E3/UL (ref 0.7–3.1)
LYMPHOCYTES NFR BLD AUTO: 16 %
MCH RBC QN AUTO: 30.1 PG (ref 26.6–33)
MCHC RBC AUTO-ENTMCNC: 34.1 G/DL (ref 31.5–35.7)
MCV RBC AUTO: 88 FL (ref 79–97)
MONOCYTES # BLD AUTO: 0.5 X10E3/UL (ref 0.1–0.9)
MONOCYTES NFR BLD AUTO: 8 %
NEUTROPHILS # BLD AUTO: 4.8 X10E3/UL (ref 1.4–7)
NEUTROPHILS NFR BLD AUTO: 71 %
PLATELET # BLD AUTO: 221 X10E3/UL (ref 150–450)
POTASSIUM SERPL-SCNC: 4.8 MMOL/L (ref 3.5–5.2)
PROT SERPL-MCNC: 6.1 G/DL (ref 6–8.5)
PSA SERPL-MCNC: 1.1 NG/ML (ref 0–4)
RBC # BLD AUTO: 5.51 X10E6/UL (ref 4.14–5.8)
SODIUM SERPL-SCNC: 139 MMOL/L (ref 134–144)
TRIGL SERPL-MCNC: 92 MG/DL (ref 0–149)
TSH SERPL DL<=0.005 MIU/L-ACNC: 0.8 UIU/ML (ref 0.45–4.5)
VLDLC SERPL CALC-MCNC: 17 MG/DL (ref 5–40)
WBC # BLD AUTO: 6.6 X10E3/UL (ref 3.4–10.8)

## 2022-08-10 PROCEDURE — S0260 H&P FOR SURGERY: HCPCS | Performed by: INTERNAL MEDICINE

## 2022-08-10 PROCEDURE — 25010000002 PROPOFOL 10 MG/ML EMULSION: Performed by: NURSE ANESTHETIST, CERTIFIED REGISTERED

## 2022-08-10 PROCEDURE — 88305 TISSUE EXAM BY PATHOLOGIST: CPT | Performed by: INTERNAL MEDICINE

## 2022-08-10 PROCEDURE — 45380 COLONOSCOPY AND BIOPSY: CPT | Performed by: INTERNAL MEDICINE

## 2022-08-10 PROCEDURE — 45385 COLONOSCOPY W/LESION REMOVAL: CPT | Performed by: INTERNAL MEDICINE

## 2022-08-10 RX ORDER — SODIUM CHLORIDE, SODIUM LACTATE, POTASSIUM CHLORIDE, CALCIUM CHLORIDE 600; 310; 30; 20 MG/100ML; MG/100ML; MG/100ML; MG/100ML
30 INJECTION, SOLUTION INTRAVENOUS CONTINUOUS
Status: DISCONTINUED | OUTPATIENT
Start: 2022-08-10 | End: 2022-08-10 | Stop reason: HOSPADM

## 2022-08-10 RX ORDER — PROPOFOL 10 MG/ML
VIAL (ML) INTRAVENOUS CONTINUOUS PRN
Status: DISCONTINUED | OUTPATIENT
Start: 2022-08-10 | End: 2022-08-10 | Stop reason: SURG

## 2022-08-10 RX ORDER — LIDOCAINE HYDROCHLORIDE 20 MG/ML
INJECTION, SOLUTION INFILTRATION; PERINEURAL AS NEEDED
Status: DISCONTINUED | OUTPATIENT
Start: 2022-08-10 | End: 2022-08-10 | Stop reason: SURG

## 2022-08-10 RX ADMIN — SODIUM CHLORIDE, POTASSIUM CHLORIDE, SODIUM LACTATE AND CALCIUM CHLORIDE 30 ML/HR: 600; 310; 30; 20 INJECTION, SOLUTION INTRAVENOUS at 07:36

## 2022-08-10 RX ADMIN — PROPOFOL 180 MCG/KG/MIN: 10 INJECTION, EMULSION INTRAVENOUS at 08:09

## 2022-08-10 RX ADMIN — LIDOCAINE HYDROCHLORIDE 60 MG: 20 INJECTION, SOLUTION INFILTRATION; PERINEURAL at 08:09

## 2022-08-10 NOTE — ANESTHESIA PREPROCEDURE EVALUATION
Anesthesia Evaluation     Patient summary reviewed and Nursing notes reviewed   no history of anesthetic complications:  NPO Solid Status: > 8 hours  NPO Liquid Status: > 2 hours           Airway   Mallampati: I  TM distance: >3 FB  Neck ROM: full  Dental      Pulmonary     breath sounds clear to auscultation  (+) a smoker Former, sleep apnea,   (-) shortness of breath  Cardiovascular   Exercise tolerance: good (4-7 METS)    ECG reviewed  Rhythm: regular  Rate: normal    (+) hypertension (Took B-blocker this morning. Did not take his ACE-inhibitor. ), dysrhythmias Tachycardia, hyperlipidemia,   (-) CAD, angina, CABA    ROS comment: - ABNORMAL ECG -  Sinus rhythm  LAD, consider LAFB or inferior infarct  Anterior infarct, old  Inferior ST changes improved vs previous    Neuro/Psych  GI/Hepatic/Renal/Endo    (+) obesity,     (-) no renal disease, diabetes, GI bleed    Musculoskeletal     Abdominal    Substance History   (-) alcohol use (Multiple drinks/day)     OB/GYN          Other   arthritis,    history of cancer remission    ROS/Med Hx Other: Claims no DM any more.  Loop recorder has been removed.                  Anesthesia Plan    ASA 3     general     intravenous induction     Anesthetic plan, risks, benefits, and alternatives have been provided, discussed and informed consent has been obtained with: patient.  Pre-procedure education provided  Plan discussed with CRNA.
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
## 2022-08-10 NOTE — ANESTHESIA POSTPROCEDURE EVALUATION
Patient: Berhane Chaidez    Procedure Summary     Date: 08/10/22 Room / Location:  [facility] /  [facility]    Anesthesia Start: 0805 Anesthesia Stop: 0829    Procedure: COLONOSCOPY INTO CECUM AND TI WITH COLD SNARE/COLD BX POLYPECTOMIES (N/A ) Diagnosis:       History of adenomatous polyp of colon      (History of adenomatous polyp of colon [Z86.010])    Surgeons: Ana Dai MD Provider: Van Armendariz MD    Anesthesia Type: general ASA Status: 3          Anesthesia Type: general    Vitals  Vitals Value Taken Time   /71 08/10/22 0851   Temp 36.8 °C (98.2 °F) 08/10/22 0831   Pulse 61 08/10/22 0851   Resp 17 08/10/22 0851   SpO2 95 % 08/10/22 0851           Post Anesthesia Care and Evaluation    Patient location during evaluation: bedside  Patient participation: complete - patient participated  Level of consciousness: awake  Pain management: adequate    Airway patency: patent  Anesthetic complications: No anesthetic complications  PONV Status: none  Cardiovascular status: acceptable  Respiratory status: acceptable  Hydration status: acceptable  Post Neuraxial Block status: Motor and sensory function returned to baseline

## 2022-08-10 NOTE — DISCHARGE INSTRUCTIONS
For the next 24 hours patient needs to be with a responsible adult.    For 24 hours DO NOT drive, operate machinery, appliances, drink alcohol, make important decisions or sign legal documents.    Start with a light or bland diet if you are feeling sick to your stomach otherwise advance to regular diet as tolerated.    Follow recommendations on procedure report if provided by your doctor.    Call Dr Dai for problems [phone]    Problems may include but not limited to: large amounts of bleeding, trouble breathing, repeated vomiting, severe unrelieved pain, fever or chills.

## 2022-08-11 LAB
LAB AP CASE REPORT: NORMAL
PATH REPORT.FINAL DX SPEC: NORMAL
PATH REPORT.GROSS SPEC: NORMAL

## 2022-08-15 ENCOUNTER — OFFICE VISIT (OUTPATIENT)
Dept: INTERNAL MEDICINE | Facility: CLINIC | Age: 72
End: 2022-08-15

## 2022-08-15 VITALS
DIASTOLIC BLOOD PRESSURE: 68 MMHG | BODY MASS INDEX: 35.31 KG/M2 | WEIGHT: 238.4 LBS | HEIGHT: 69 IN | SYSTOLIC BLOOD PRESSURE: 130 MMHG

## 2022-08-15 DIAGNOSIS — F52.21 ED (ERECTILE DYSFUNCTION) OF NON-ORGANIC ORIGIN: ICD-10-CM

## 2022-08-15 DIAGNOSIS — E78.5 HYPERLIPIDEMIA, UNSPECIFIED HYPERLIPIDEMIA TYPE: Primary | ICD-10-CM

## 2022-08-15 DIAGNOSIS — Z86.010 HISTORY OF COLON POLYPS: ICD-10-CM

## 2022-08-15 DIAGNOSIS — I47.1 PSVT (PAROXYSMAL SUPRAVENTRICULAR TACHYCARDIA): ICD-10-CM

## 2022-08-15 DIAGNOSIS — E55.9 HYPOVITAMINOSIS D: ICD-10-CM

## 2022-08-15 DIAGNOSIS — E29.1 HYPOGONADISM IN MALE: ICD-10-CM

## 2022-08-15 DIAGNOSIS — Z00.00 ROUTINE HEALTH MAINTENANCE: ICD-10-CM

## 2022-08-15 DIAGNOSIS — N40.1 BENIGN PROSTATIC HYPERPLASIA WITH LOWER URINARY TRACT SYMPTOMS, SYMPTOM DETAILS UNSPECIFIED: ICD-10-CM

## 2022-08-15 DIAGNOSIS — Z85.828 HISTORY OF BASAL CELL CARCINOMA: ICD-10-CM

## 2022-08-15 DIAGNOSIS — R73.03 PREDIABETES: ICD-10-CM

## 2022-08-15 PROCEDURE — 99214 OFFICE O/P EST MOD 30 MIN: CPT | Performed by: FAMILY MEDICINE

## 2022-08-15 RX ORDER — SIMVASTATIN 40 MG
40 TABLET ORAL DAILY
Qty: 90 TABLET | Refills: 3 | Status: SHIPPED | OUTPATIENT
Start: 2022-08-15

## 2022-08-15 NOTE — PROGRESS NOTES
Subjective     Berhane hCaidez is a 72 y.o. male, who presents with a chief complaint of   Chief Complaint   Patient presents with   • Hypertension   • Hyperlipidemia     Hyperlipidemia    Hypertension    Diabetes    1. Hyperlipidemia.  Pt tolerating simvastatin.  He exercises regularly and tries to watch his diet.    2. Prediabetes.  He was previously on metformin but now controls this with lifestyle measures.     3. SVT.  He is on bisoprolol and flecainide.  He reports that he is feeling well.  Denies palpitations.  He sees Dr. Morgan.  He is having the loop recorder moved next week.    The following portions of the patient's history were reviewed and updated as appropriate: allergies, current medications, past family history, past medical history, past social history, past surgical history and problem list.    Allergies: Patient has no known allergies.    Review of Systems   Constitutional: Negative.    HENT: Negative.    Eyes: Negative.    Respiratory: Negative.    Cardiovascular: Negative.    Gastrointestinal: Negative.    Endocrine: Negative.    Genitourinary: Negative.    Musculoskeletal: Negative.    Skin: Negative.    Allergic/Immunologic: Negative.    Neurological: Negative.    Hematological: Negative.    Psychiatric/Behavioral: Negative.      Objective     Wt Readings from Last 3 Encounters:   08/15/22 108 kg (238 lb 6.4 oz)   08/10/22 106 kg (234 lb)   06/30/22 106 kg (233 lb 6.4 oz)     Temp Readings from Last 3 Encounters:   08/10/22 98.2 °F (36.8 °C) (Oral)   06/10/22 97.7 °F (36.5 °C) (Infrared)   06/09/22 97.8 °F (36.6 °C)     BP Readings from Last 3 Encounters:   08/15/22 130/68   08/10/22 132/71   06/30/22 142/78     Pulse Readings from Last 3 Encounters:   08/10/22 61   06/30/22 80   06/10/22 69     Body mass index is 35.21 kg/m².  SpO2 Readings from Last 3 Encounters:   01/04/18 98%   06/29/17 94%   05/23/17 97%       Physical Exam   Constitutional: He is oriented to person, place, and time. He  appears well-developed.   HENT:   Head: Normocephalic and atraumatic.   Mouth/Throat: Mucous membranes are moist.   Eyes: Conjunctivae are normal.   Neck: No thyromegaly present.   Cardiovascular: Normal rate, regular rhythm and normal heart sounds.   No murmur heard.  Pulmonary/Chest: Effort normal and breath sounds normal.   Abdominal: Soft. Normal appearance. There is no abdominal tenderness.   Musculoskeletal:      Right lower leg: No edema.      Left lower leg: No edema.   Neurological: He is alert and oriented to person, place, and time.   Skin: Skin is warm and dry.   Psychiatric: His behavior is normal. Mood normal.   Nursing note and vitals reviewed.    Assessment/Plan   Diagnoses and all orders for this visit:    1. Hyperlipidemia, unspecified hyperlipidemia type (Primary)  -     simvastatin (ZOCOR) 40 MG tablet; Take 1 tablet by mouth Daily. AT BEDTIME  Dispense: 90 tablet; Refill: 3  -     Comprehensive Metabolic Panel; Future  -     TSH; Future  -     Lipid Panel With / Chol / HDL Ratio; Future    2. Prediabetes  -     Hemoglobin A1c; Future    3. Benign prostatic hyperplasia with lower urinary tract symptoms, symptom details unspecified    4. Hypogonadism in male  -     CBC & Differential; Future    5. History of basal cell carcinoma    6. PSVT (paroxysmal supraventricular tachycardia) (HCC)    7. History of colon polyps    8. ED (erectile dysfunction) of non-organic origin    9. Routine health maintenance  -     Vitamin B12; Future    10. Hypovitaminosis D  -     Vitamin D 25 Hydroxy; Future    1. Hyperlipidemia.  Well-controlled.  Continue simvastatin and lifestyle measures.    2. Prediabetes.  A1c stable at 5.9.  Stay off metformin and continue lifestyle measures.     3. BPH.  Symptoms controlled.  Per Dr. Dumont.    4. Hypogonadism.  Has Testopel implants per Dr. Dumont.    5. Hx BCC.  Followed every 6 months by Dr. Donohue.    6. SVT.  He sees Dr. Morgan.  He has a loop recorder in place, to be  "removed next week, and is on bisoprolol and flecainide.  Doing well.    7. History of colon polyps.  Colonoscopy 8/2022 showed tubular adenomas X 3.  Dr. Dai recommended repeat in 3 years.    8. E. D. Controlled with sildenafil per Dr. Dumont.    9. Routine health maint.  Pneumococcal vaccines UTD.  Had Shingrix at pharmacy.  AAA screening u/s wasn't done but he had CT scan 2016 with \"normal caliber aorta.\"        Outpatient Medications Prior to Visit   Medication Sig Dispense Refill   • aspirin 81 MG EC tablet Take 81 mg by mouth Daily. HELD SINCE 8/9/2022     • bisoprolol (ZEBeta) 5 MG tablet TAKE ONE TABLET BY MOUTH DAILY     • Boswellia-Glucosamine-Vit D (GLUCOSAMINE COMPLEX PO) Take 1,100 mg by mouth Daily. HOLD ONE WEEK PRIOR TO SURGERY     • Cyanocobalamin (Vitamin B 12) 500 MCG tablet Take 1,000 mcg by mouth Daily.     • flecainide (TAMBOCOR) 50 MG tablet Take 50 mg by mouth 2 (Two) Times a Day.     • Garlic 1000 MG capsule Take 1,000 mg by mouth Daily. HOLD ONE WEEK PRIOR TO SURGERY     • Hydrocortisone, Perianal, (Anusol-HC) 2.5 % rectal cream Apply to hemorrhoids 3 times daily for 7-10 days during hemorrhoid flare. Not for daily long-term use.  Include applicator. 30 g 1   • L-Lysine 1000 MG tablet Take 1,000 mg by mouth Daily. HOLD ONE WEEK PRIOR TO SURGERY     • lisinopril (PRINIVIL,ZESTRIL) 10 MG tablet Take 10 mg by mouth Daily.     • sildenafil (REVATIO) 20 MG tablet Take 20 mg by mouth As Needed. HOLD FOR 3 DAYS PRIOR TO SURGERY     • tamsulosin (FLOMAX) 0.4 MG capsule 24 hr capsule Take 1 capsule by mouth Daily. 90 capsule 2   • Testosterone (TESTOPEL IL) by Implant route. Injections Twice a Year.     • vitamin D3 125 MCG (5000 UT) capsule capsule Take 5,000 Units by mouth Daily. HOLD ONE WEEK PRIOR TO SURGERY     • simvastatin (ZOCOR) 40 MG tablet Take 1 tablet by mouth Daily. AT BEDTIME 90 tablet 3     No facility-administered medications prior to visit.     New Medications Ordered This Visit "   Medications   • simvastatin (ZOCOR) 40 MG tablet     Sig: Take 1 tablet by mouth Daily. AT BEDTIME     Dispense:  90 tablet     Refill:  3     [unfilled]  Medications Discontinued During This Encounter   Medication Reason   • simvastatin (ZOCOR) 40 MG tablet Reorder       Return in about 6 months (around 2/15/2023).

## 2022-08-18 ENCOUNTER — HOSPITAL ENCOUNTER (OUTPATIENT)
Dept: NUCLEAR MEDICINE | Facility: HOSPITAL | Age: 72
Discharge: HOME | End: 2022-08-18

## 2022-08-18 DIAGNOSIS — T84.032A MECHANICAL LOOSENING OF INTERNAL RIGHT KNEE PROSTHETIC JOINT, INITIAL ENCOUNTER: ICD-10-CM

## 2022-08-18 PROCEDURE — 0 TECHNETIUM MEDRONATE KIT: Performed by: ORTHOPAEDIC SURGERY

## 2022-08-18 PROCEDURE — A9500 TC99M SESTAMIBI: HCPCS | Performed by: ORTHOPAEDIC SURGERY

## 2022-08-18 PROCEDURE — 78306 BONE IMAGING WHOLE BODY: CPT

## 2022-08-18 PROCEDURE — A9503 TC99M MEDRONATE: HCPCS | Performed by: ORTHOPAEDIC SURGERY

## 2022-08-18 PROCEDURE — 0 TECHNETIUM SESTAMIBI: Performed by: ORTHOPAEDIC SURGERY

## 2022-08-18 RX ORDER — TC 99M MEDRONATE 20 MG/10ML
21 INJECTION, POWDER, LYOPHILIZED, FOR SOLUTION INTRAVENOUS
Status: COMPLETED | OUTPATIENT
Start: 2022-08-18 | End: 2022-08-18

## 2022-08-18 RX ADMIN — Medication 21 MILLICURIE: at 08:10

## 2022-08-19 ENCOUNTER — TELEPHONE (OUTPATIENT)
Dept: GASTROENTEROLOGY | Facility: CLINIC | Age: 72
End: 2022-08-19

## 2022-08-19 NOTE — TELEPHONE ENCOUNTER
Per fadi pt has reviewed results and Dr Dai's note on 08/16/2022.    C/s placed in recall and hm for 08/10/2025.

## 2022-08-19 NOTE — TELEPHONE ENCOUNTER
----- Message from Ana Dai MD sent at 8/16/2022 10:08 AM EDT -----  The polyp(s) biopsies showed adenomatous change. This is not cancerous but is considered potentially precancerous. Follow-up colonoscopy in 3 years is advised.

## 2022-09-27 DIAGNOSIS — N40.1 BENIGN NON-NODULAR PROSTATIC HYPERPLASIA WITH LOWER URINARY TRACT SYMPTOMS: ICD-10-CM

## 2022-09-27 RX ORDER — TAMSULOSIN HYDROCHLORIDE 0.4 MG/1
1 CAPSULE ORAL DAILY
Qty: 90 CAPSULE | Refills: 2 | Status: SHIPPED | OUTPATIENT
Start: 2022-09-27

## 2022-09-27 NOTE — TELEPHONE ENCOUNTER
Rx Refill Note  Requested Prescriptions     Pending Prescriptions Disp Refills   • tamsulosin (FLOMAX) 0.4 MG capsule 24 hr capsule 90 capsule 2     Sig: Take 1 capsule by mouth Daily.      Last office visit with prescribing clinician: 8/15/2022      Next office visit with prescribing clinician: 2/22/2023            Sury Hopson MA  09/27/22, 10:00 EDT

## 2022-10-15 ENCOUNTER — FLU SHOT (OUTPATIENT)
Dept: FAMILY MEDICINE CLINIC | Facility: CLINIC | Age: 72
End: 2022-10-15

## 2022-10-15 DIAGNOSIS — Z23 NEED FOR INFLUENZA VACCINATION: Primary | ICD-10-CM

## 2022-10-15 PROCEDURE — G0008 ADMIN INFLUENZA VIRUS VAC: HCPCS | Performed by: NURSE PRACTITIONER

## 2022-10-15 PROCEDURE — 90662 IIV NO PRSV INCREASED AG IM: CPT | Performed by: NURSE PRACTITIONER

## 2023-02-22 ENCOUNTER — OFFICE VISIT (OUTPATIENT)
Dept: INTERNAL MEDICINE | Facility: CLINIC | Age: 73
End: 2023-02-22
Payer: MEDICARE

## 2023-02-22 VITALS
DIASTOLIC BLOOD PRESSURE: 72 MMHG | SYSTOLIC BLOOD PRESSURE: 130 MMHG | OXYGEN SATURATION: 95 % | BODY MASS INDEX: 35.28 KG/M2 | WEIGHT: 238.2 LBS | HEART RATE: 55 BPM | HEIGHT: 69 IN | TEMPERATURE: 98.4 F

## 2023-02-22 DIAGNOSIS — Z85.828 HISTORY OF BASAL CELL CARCINOMA OF SKIN: ICD-10-CM

## 2023-02-22 DIAGNOSIS — E78.5 HYPERLIPIDEMIA, UNSPECIFIED HYPERLIPIDEMIA TYPE: Primary | ICD-10-CM

## 2023-02-22 DIAGNOSIS — R73.03 PREDIABETES: ICD-10-CM

## 2023-02-22 DIAGNOSIS — F52.21 ED (ERECTILE DYSFUNCTION) OF NON-ORGANIC ORIGIN: ICD-10-CM

## 2023-02-22 DIAGNOSIS — N40.1 BENIGN PROSTATIC HYPERPLASIA WITH LOWER URINARY TRACT SYMPTOMS, SYMPTOM DETAILS UNSPECIFIED: ICD-10-CM

## 2023-02-22 DIAGNOSIS — Z12.5 SPECIAL SCREENING FOR MALIGNANT NEOPLASM OF PROSTATE: ICD-10-CM

## 2023-02-22 DIAGNOSIS — Z86.010 HISTORY OF COLON POLYPS: ICD-10-CM

## 2023-02-22 DIAGNOSIS — M79.89 RIGHT AXILLARY SWELLING: ICD-10-CM

## 2023-02-22 DIAGNOSIS — E29.1 HYPOGONADISM IN MALE: ICD-10-CM

## 2023-02-22 DIAGNOSIS — Z00.00 ROUTINE HEALTH MAINTENANCE: ICD-10-CM

## 2023-02-22 DIAGNOSIS — I47.1 PSVT (PAROXYSMAL SUPRAVENTRICULAR TACHYCARDIA): ICD-10-CM

## 2023-02-22 DIAGNOSIS — E55.9 HYPOVITAMINOSIS D: ICD-10-CM

## 2023-02-22 PROCEDURE — 3044F HG A1C LEVEL LT 7.0%: CPT | Performed by: FAMILY MEDICINE

## 2023-02-22 PROCEDURE — 99214 OFFICE O/P EST MOD 30 MIN: CPT | Performed by: FAMILY MEDICINE

## 2023-02-22 RX ORDER — FLECAINIDE ACETATE 50 MG/1
50 TABLET ORAL EVERY 12 HOURS
COMMUNITY
Start: 2023-01-27

## 2023-02-22 NOTE — PROGRESS NOTES
Subjective     Berhane Chaidez is a 72 y.o. male, who presents with a chief complaint of   Chief Complaint   Patient presents with   • Follow-up     Lab review   • Hyperlipidemia     Hypertension    Hyperlipidemia    Diabetes      1. Hyperlipidemia.  Pt tolerating simvastatin.  He exercises regularly and tries to watch his diet.    2. Prediabetes.  He was previously on metformin but now controls this with lifestyle measures.     3. SVT.  He is on bisoprolol and flecainide.  He reports that he is feeling well.  Denies palpitations.  He sees Dr. Morgan.  He is having the loop recorder moved next week.    4. Swelling of right axilla.  Waxes and wanes X several months.  Non tender.  No drainage.  Becomes a little red at times.    The following portions of the patient's history were reviewed and updated as appropriate: allergies, current medications, past family history, past medical history, past social history, past surgical history and problem list.    Allergies: Patient has no known allergies.    Review of Systems   Constitutional: Negative.    HENT: Negative.    Eyes: Negative.    Respiratory: Negative.    Cardiovascular: Negative.    Gastrointestinal: Negative.    Endocrine: Negative.    Genitourinary: Negative.    Musculoskeletal: Negative.    Skin: Negative.    Allergic/Immunologic: Negative.    Neurological: Negative.    Hematological: Negative.    Psychiatric/Behavioral: Negative.      Objective     Wt Readings from Last 3 Encounters:   02/22/23 108 kg (238 lb 3.2 oz)   10/08/22 108 kg (238 lb)   08/15/22 108 kg (238 lb 6.4 oz)     Temp Readings from Last 3 Encounters:   02/22/23 98.4 °F (36.9 °C)   10/08/22 97.9 °F (36.6 °C) (Infrared)   08/10/22 98.2 °F (36.8 °C) (Oral)     BP Readings from Last 3 Encounters:   02/22/23 130/72   10/08/22 138/85   08/15/22 130/68     Pulse Readings from Last 3 Encounters:   02/22/23 55   10/08/22 63   08/10/22 61     Body mass index is 35.16 kg/m².  SpO2 Readings from Last 3  Encounters:   01/04/18 98%   06/29/17 94%   05/23/17 97%       Physical Exam   Constitutional: He is oriented to person, place, and time. He appears well-developed.   HENT:   Head: Normocephalic and atraumatic.   Mouth/Throat: Mucous membranes are moist.   Eyes: Conjunctivae are normal.   Neck: No thyromegaly present.   Cardiovascular: Normal rate, regular rhythm and normal heart sounds.   No murmur heard.  Pulmonary/Chest: Effort normal and breath sounds normal.   Abdominal: Soft. Normal appearance. There is no abdominal tenderness.   Musculoskeletal:        Arms:       Right lower leg: No edema.      Left lower leg: No edema.      Comments: Prominence of right axillary region.  Nontender.  Soft.   Neurological: He is alert and oriented to person, place, and time.   Skin: Skin is warm and dry.   Psychiatric: His behavior is normal. Mood normal.   Nursing note and vitals reviewed.    Assessment/Plan   Diagnoses and all orders for this visit:    1. Hyperlipidemia, unspecified hyperlipidemia type (Primary)  -     Comprehensive Metabolic Panel; Future  -     Lipid Panel With / Chol / HDL Ratio; Future  -     TSH; Future    2. Prediabetes  -     Hemoglobin A1c; Future  -     Vitamin B12; Future    3. Benign prostatic hyperplasia with lower urinary tract symptoms, symptom details unspecified    4. Hypogonadism in male    5. History of basal cell carcinoma of skin    6. PSVT (paroxysmal supraventricular tachycardia) (HCC)    7. History of colon polyps    8. ED (erectile dysfunction) of non-organic origin    9. Routine health maintenance  -     CBC Auto Differential; Future    10. Hypovitaminosis D  -     Vitamin D,25-Hydroxy; Future    11. Special screening for malignant neoplasm of prostate  -     PSA SCREENING; Future    12. Right axillary swelling  -     US soft tissue; Future    1. Hyperlipidemia.  Well-controlled.  Continue simvastatin and lifestyle measures.    2. Prediabetes.  A1c 5.8, down from 6.2.  Stay off  "metformin and continue lifestyle measures.     3. BPH.  Symptoms controlled.  Per Dr. Dumont.    4. Hypogonadism.  Has is on topical testosterone per Dr. Dumont.  No longer on Testopel d/t insurance changes.    5. Hx BCC.  Followed every 6 months by Dr. Donohue.    6. SVT.  He sees Dr. Morgan.  He is on bisoprolol and flecainide.  Doing well.    7. History of colon polyps.  Colonoscopy 8/2022 showed tubular adenomas X 3.  Dr. Dai recommended repeat in 3 years.    8. E. D. Controlled with sildenafil per Dr. Dumont.    9. Routine health maint.  Pneumococcal vaccines UTD.  Had Shingrix at pharmacy.  AAA screening u/s wasn't done but he had CT scan 2016 with \"normal caliber aorta.\"    10. Right axillary swelling.  ?Lipoma.  Check soft tissue u/s.        Outpatient Medications Prior to Visit   Medication Sig Dispense Refill   • aspirin 81 MG EC tablet Take 81 mg by mouth Daily. HELD SINCE 8/9/2022     • bisoprolol (ZEBeta) 5 MG tablet TAKE ONE TABLET BY MOUTH DAILY     • Boswellia-Glucosamine-Vit D (GLUCOSAMINE COMPLEX PO) Take 1,100 mg by mouth Daily. HOLD ONE WEEK PRIOR TO SURGERY     • Cyanocobalamin (Vitamin B 12) 500 MCG tablet Take 1,000 mcg by mouth Daily.     • flecainide (TAMBOCOR) 50 MG tablet Take 50 mg by mouth Every 12 (Twelve) Hours.     • Garlic 1000 MG capsule Take 1,000 mg by mouth Daily. HOLD ONE WEEK PRIOR TO SURGERY     • L-Lysine 1000 MG tablet Take 1,000 mg by mouth Daily. HOLD ONE WEEK PRIOR TO SURGERY     • lisinopril (PRINIVIL,ZESTRIL) 10 MG tablet Take 10 mg by mouth Daily.     • sildenafil (REVATIO) 20 MG tablet Take 20 mg by mouth As Needed. HOLD FOR 3 DAYS PRIOR TO SURGERY     • simvastatin (ZOCOR) 40 MG tablet Take 1 tablet by mouth Daily. AT BEDTIME 90 tablet 3   • tamsulosin (FLOMAX) 0.4 MG capsule 24 hr capsule Take 1 capsule by mouth Daily. 90 capsule 2   • vitamin D3 125 MCG (5000 UT) capsule capsule Take 5,000 Units by mouth Daily. HOLD ONE WEEK PRIOR TO SURGERY     • flecainide " (TAMBOCOR) 50 MG tablet Take 50 mg by mouth 2 (Two) Times a Day.     • Hydrocortisone, Perianal, (Anusol-HC) 2.5 % rectal cream Apply to hemorrhoids 3 times daily for 7-10 days during hemorrhoid flare. Not for daily long-term use.  Include applicator. 30 g 1   • Testosterone (TESTOPEL IL) by Implant route. Injections Twice a Year.       No facility-administered medications prior to visit.     No orders of the defined types were placed in this encounter.    [unfilled]  Medications Discontinued During This Encounter   Medication Reason   • Testosterone (TESTOPEL IL)    • Hydrocortisone, Perianal, (Anusol-HC) 2.5 % rectal cream    • flecainide (TAMBOCOR) 50 MG tablet *Therapy completed       Return in about 6 months (around 8/22/2023).

## 2023-03-15 ENCOUNTER — HOSPITAL ENCOUNTER (OUTPATIENT)
Dept: ULTRASOUND IMAGING | Facility: HOSPITAL | Age: 73
Discharge: HOME | End: 2023-03-15
Admitting: FAMILY MEDICINE
Payer: MEDICARE

## 2023-03-15 DIAGNOSIS — M79.89 RIGHT AXILLARY SWELLING: ICD-10-CM

## 2023-03-15 PROCEDURE — 76882 US LMTD JT/FCL EVL NVASC XTR: CPT

## 2023-07-17 ENCOUNTER — TELEPHONE (OUTPATIENT)
Dept: INTERNAL MEDICINE | Facility: CLINIC | Age: 73
End: 2023-07-17

## 2023-07-17 NOTE — TELEPHONE ENCOUNTER
Hub staff attempted to follow warm transfer process and was unsuccessful     Caller: Nini Chaidez    Relationship to patient: [facility]    Best call back number: [phone]    Type of visit: LABS     Requested date: PRIOR TO 10/4/2023 APPOINTMENT; EARLY MORNING REQUESTED      If rescheduling, when is the original appointment: 8/17/2023

## 2023-08-18 DIAGNOSIS — E78.5 HYPERLIPIDEMIA, UNSPECIFIED HYPERLIPIDEMIA TYPE: ICD-10-CM

## 2023-08-21 ENCOUNTER — OFFICE VISIT (OUTPATIENT)
Dept: INTERNAL MEDICINE | Facility: CLINIC | Age: 73
End: 2023-08-21
Payer: MEDICARE

## 2023-08-21 VITALS
TEMPERATURE: 97.8 F | DIASTOLIC BLOOD PRESSURE: 70 MMHG | OXYGEN SATURATION: 97 % | HEART RATE: 56 BPM | BODY MASS INDEX: 35.58 KG/M2 | HEIGHT: 69 IN | SYSTOLIC BLOOD PRESSURE: 132 MMHG | WEIGHT: 240.2 LBS

## 2023-08-21 DIAGNOSIS — E78.5 HYPERLIPIDEMIA, UNSPECIFIED HYPERLIPIDEMIA TYPE: Primary | ICD-10-CM

## 2023-08-21 DIAGNOSIS — E29.1 HYPOGONADISM IN MALE: ICD-10-CM

## 2023-08-21 DIAGNOSIS — R73.03 PREDIABETES: ICD-10-CM

## 2023-08-21 DIAGNOSIS — F52.21 ED (ERECTILE DYSFUNCTION) OF NON-ORGANIC ORIGIN: ICD-10-CM

## 2023-08-21 DIAGNOSIS — Z85.828 HISTORY OF BASAL CELL CARCINOMA OF SKIN: ICD-10-CM

## 2023-08-21 DIAGNOSIS — N40.1 BENIGN PROSTATIC HYPERPLASIA WITH LOWER URINARY TRACT SYMPTOMS, SYMPTOM DETAILS UNSPECIFIED: ICD-10-CM

## 2023-08-21 DIAGNOSIS — Z86.010 HISTORY OF COLON POLYPS: ICD-10-CM

## 2023-08-21 DIAGNOSIS — E55.9 HYPOVITAMINOSIS D: ICD-10-CM

## 2023-08-21 DIAGNOSIS — Z00.00 ROUTINE HEALTH MAINTENANCE: ICD-10-CM

## 2023-08-21 DIAGNOSIS — I47.1 PSVT (PAROXYSMAL SUPRAVENTRICULAR TACHYCARDIA): ICD-10-CM

## 2023-08-21 PROCEDURE — 99214 OFFICE O/P EST MOD 30 MIN: CPT | Performed by: FAMILY MEDICINE

## 2023-08-21 PROCEDURE — 1159F MED LIST DOCD IN RCRD: CPT | Performed by: FAMILY MEDICINE

## 2023-08-21 PROCEDURE — 3075F SYST BP GE 130 - 139MM HG: CPT | Performed by: FAMILY MEDICINE

## 2023-08-21 PROCEDURE — 3044F HG A1C LEVEL LT 7.0%: CPT | Performed by: FAMILY MEDICINE

## 2023-08-21 PROCEDURE — 1160F RVW MEDS BY RX/DR IN RCRD: CPT | Performed by: FAMILY MEDICINE

## 2023-08-21 PROCEDURE — 3078F DIAST BP <80 MM HG: CPT | Performed by: FAMILY MEDICINE

## 2023-08-21 RX ORDER — TESTOSTERONE 16.2 MG/G
GEL TRANSDERMAL
COMMUNITY
Start: 2023-08-14

## 2023-08-21 RX ORDER — SIMVASTATIN 40 MG
40 TABLET ORAL DAILY
Qty: 90 TABLET | Refills: 3 | OUTPATIENT
Start: 2023-08-21

## 2023-08-21 RX ORDER — SIMVASTATIN 40 MG
40 TABLET ORAL DAILY
Qty: 90 TABLET | Refills: 3 | Status: SHIPPED | OUTPATIENT
Start: 2023-08-21

## 2023-08-21 RX ORDER — CHLORHEXIDINE GLUCONATE 0.12 MG/ML
RINSE ORAL
COMMUNITY
Start: 2023-07-18

## 2023-08-21 NOTE — PROGRESS NOTES
Subjective     Berhane Chaidez is a 73 y.o. male, who presents with a chief complaint of   Chief Complaint   Patient presents with    Hyperlipidemia     F/U and review labs     Hyperlipidemia    Hypertension    Diabetes    1. Hyperlipidemia.  Pt tolerating simvastatin.  He exercises regularly and tries to watch his diet.    2. Prediabetes.  He was previously on metformin but now controls this with lifestyle measures.     3. SVT.  He is on bisoprolol and flecainide.  He reports that he is feeling well.  Denies palpitations.  He sees Dr. Morgan.      The following portions of the patient's history were reviewed and updated as appropriate: allergies, current medications, past family history, past medical history, past social history, past surgical history and problem list.    Allergies: Patient has no known allergies.    Review of Systems   Constitutional: Negative.    HENT: Negative.     Eyes: Negative.    Respiratory: Negative.     Cardiovascular: Negative.    Gastrointestinal: Negative.    Endocrine: Negative.    Genitourinary: Negative.    Musculoskeletal: Negative.    Skin: Negative.    Allergic/Immunologic: Negative.    Neurological: Negative.    Hematological: Negative.    Psychiatric/Behavioral: Negative.     Objective     Wt Readings from Last 3 Encounters:   08/21/23 109 kg (240 lb 3.2 oz)   02/22/23 108 kg (238 lb 3.2 oz)   10/08/22 108 kg (238 lb)     Temp Readings from Last 3 Encounters:   08/21/23 97.8 øF (36.6 øC) (Infrared)   02/22/23 98.4 øF (36.9 øC)   10/08/22 97.9 øF (36.6 øC) (Infrared)     BP Readings from Last 3 Encounters:   08/21/23 132/70   02/22/23 130/72   10/08/22 138/85     Pulse Readings from Last 3 Encounters:   08/21/23 56   02/22/23 55   10/08/22 63     Body mass index is 35.45 kg/mý.  SpO2 Readings from Last 3 Encounters:   01/04/18 98%   06/29/17 94%   05/23/17 97%       Physical Exam   Constitutional: He is oriented to person, place, and time. He appears well-developed.   HENT:    Head: Normocephalic and atraumatic.   Mouth/Throat: Mucous membranes are moist.   Eyes: Conjunctivae are normal.   Neck: No thyromegaly present.   Cardiovascular: Normal rate, regular rhythm and normal heart sounds.   No murmur heard.  Pulmonary/Chest: Effort normal and breath sounds normal.   Abdominal: Soft. Normal appearance. There is no abdominal tenderness.   Musculoskeletal:      Right lower leg: No edema.      Left lower leg: No edema.   Neurological: He is alert and oriented to person, place, and time.   Skin: Skin is warm and dry.   Psychiatric: His behavior is normal. Mood normal.   Nursing note and vitals reviewed.  Assessment/Plan   Diagnoses and all orders for this visit:    1. Hyperlipidemia, unspecified hyperlipidemia type (Primary)  -     simvastatin (ZOCOR) 40 MG tablet; Take 1 tablet by mouth Daily. AT BEDTIME  Dispense: 90 tablet; Refill: 3  -     Comprehensive Metabolic Panel; Future  -     Lipid Panel With / Chol / HDL Ratio; Future  -     TSH; Future    2. Prediabetes  -     Hemoglobin A1c; Future  -     Vitamin B12; Future    3. Benign prostatic hyperplasia with lower urinary tract symptoms, symptom details unspecified    4. Hypogonadism in male  -     CBC Auto Differential; Future    5. History of basal cell carcinoma of skin    6. PSVT (paroxysmal supraventricular tachycardia)    7. History of colon polyps    8. ED (erectile dysfunction) of non-organic origin    9. Routine health maintenance    10. Hypovitaminosis D  -     Vitamin D,25-Hydroxy; Future    1. Hyperlipidemia.  Well-controlled.  Continue simvastatin and lifestyle measures.    2. Prediabetes.  A1c  stable at 5.8.  Stay off metformin and continue lifestyle measures.     3. BPH.  Symptoms controlled.  Per Dr. Dumont.    4. Hypogonadism.  Has is on topical testosterone per Dr. Dumont.  He is planning to restart Testopel later this month.    5. Hx BCC.  Followed every 6 months by Dr. Donohue.    6. SVT.  He sees Dr. Morgan.  He  "is on bisoprolol and flecainide.  Doing well.    7. History of colon polyps.  Colonoscopy 8/2022 showed tubular adenomas X 3.  Dr. Dai recommended repeat in 3 years.    8. E. D. Controlled with sildenafil per Dr. Dumont.    9. Routine health maint.  Pneumococcal vaccines UTD.  Had Shingrix at pharmacy.  AAA screening u/s wasn't done but he had CT scan 2016 with \"normal caliber aorta.\"    Outpatient Medications Prior to Visit   Medication Sig Dispense Refill    aspirin 81 MG EC tablet Take 1 tablet by mouth Daily. HELD SINCE 8/9/2022      bisoprolol (ZEBeta) 5 MG tablet TAKE ONE TABLET BY MOUTH DAILY      Boswellia-Glucosamine-Vit D (GLUCOSAMINE COMPLEX PO) Take 1,100 mg by mouth Daily. HOLD ONE WEEK PRIOR TO SURGERY      chlorhexidine (PERIDEX) 0.12 % solution       Cyanocobalamin (Vitamin B 12) 500 MCG tablet Take 2 tablets by mouth Daily.      flecainide (TAMBOCOR) 50 MG tablet Take 1 tablet by mouth Every 12 (Twelve) Hours.      Garlic 1000 MG capsule Take 1 capsule by mouth Daily. HOLD ONE WEEK PRIOR TO SURGERY      L-Lysine 1000 MG tablet Take 1 tablet by mouth Daily. HOLD ONE WEEK PRIOR TO SURGERY      lisinopril (PRINIVIL,ZESTRIL) 10 MG tablet Take 1 tablet by mouth Daily.      sildenafil (REVATIO) 20 MG tablet Take 1 tablet by mouth As Needed. HOLD FOR 3 DAYS PRIOR TO SURGERY      tamsulosin (FLOMAX) 0.4 MG capsule 24 hr capsule Take 1 capsule by mouth Daily. 90 capsule 2    Testosterone 20.25 MG/ACT (1.62%) gel       vitamin D3 125 MCG (5000 UT) capsule capsule Take 1 capsule by mouth Daily. HOLD ONE WEEK PRIOR TO SURGERY      simvastatin (ZOCOR) 40 MG tablet Take 1 tablet by mouth Daily. AT BEDTIME 90 tablet 3     No facility-administered medications prior to visit.     New Medications Ordered This Visit   Medications    simvastatin (ZOCOR) 40 MG tablet     Sig: Take 1 tablet by mouth Daily. AT BEDTIME     Dispense:  90 tablet     Refill:  3       [unfilled]  Medications Discontinued During This " Encounter   Medication Reason    simvastatin (ZOCOR) 40 MG tablet Reorder       Return in about 6 months (around 2/21/2024) for Medicare Wellness.    Answers submitted by the patient for this visit:  Primary Reason for Visit (Submitted on 8/14/2023)  What is the primary reason for your visit?: Other  Other (Submitted on 8/14/2023)  Please describe your symptoms.: Blood results.  Have you had these symptoms before?: No  How long have you been having these symptoms?: 1-4 days

## 2023-10-12 ENCOUNTER — OFFICE VISIT (OUTPATIENT)
Dept: INTERNAL MEDICINE | Facility: CLINIC | Age: 73
End: 2023-10-12
Payer: MEDICARE

## 2023-10-12 ENCOUNTER — HOSPITAL ENCOUNTER (OUTPATIENT)
Dept: GENERAL RADIOLOGY | Facility: HOSPITAL | Age: 73
Discharge: HOME | End: 2023-10-12
Admitting: FAMILY MEDICINE
Payer: MEDICARE

## 2023-10-12 VITALS
DIASTOLIC BLOOD PRESSURE: 70 MMHG | WEIGHT: 244.4 LBS | OXYGEN SATURATION: 95 % | HEART RATE: 87 BPM | BODY MASS INDEX: 36.2 KG/M2 | SYSTOLIC BLOOD PRESSURE: 128 MMHG | HEIGHT: 69 IN | TEMPERATURE: 99.3 F

## 2023-10-12 DIAGNOSIS — R06.89 ABNORMAL BREATH SOUNDS: ICD-10-CM

## 2023-10-12 DIAGNOSIS — R06.2 WHEEZING: ICD-10-CM

## 2023-10-12 DIAGNOSIS — J06.9 VIRAL URI WITH COUGH: ICD-10-CM

## 2023-10-12 DIAGNOSIS — R09.81 NASAL CONGESTION: Primary | ICD-10-CM

## 2023-10-12 LAB
EXPIRATION DATE: NORMAL
EXPIRATION DATE: NORMAL
FLUAV AG NPH QL: NEGATIVE
FLUBV AG NPH QL: NEGATIVE
INTERNAL CONTROL: NORMAL
INTERNAL CONTROL: NORMAL
Lab: NORMAL
Lab: NORMAL
SARS-COV-2 AG UPPER RESP QL IA.RAPID: NOT DETECTED

## 2023-10-12 PROCEDURE — 71046 X-RAY EXAM CHEST 2 VIEWS: CPT

## 2023-10-12 RX ORDER — HYDROCODONE BITARTRATE AND HOMATROPINE METHYLBROMIDE ORAL SOLUTION 5; 1.5 MG/5ML; MG/5ML
5 LIQUID ORAL EVERY 6 HOURS PRN
Qty: 120 ML | Refills: 0 | Status: SHIPPED | OUTPATIENT
Start: 2023-10-12

## 2023-10-12 RX ORDER — BENZONATATE 200 MG/1
200 CAPSULE ORAL 3 TIMES DAILY PRN
Qty: 30 CAPSULE | Refills: 0 | Status: SHIPPED | OUTPATIENT
Start: 2023-10-12

## 2023-10-12 RX ORDER — ALBUTEROL SULFATE 90 UG/1
2 AEROSOL, METERED RESPIRATORY (INHALATION) EVERY 4 HOURS PRN
Qty: 18 G | Refills: 0 | Status: SHIPPED | OUTPATIENT
Start: 2023-10-12

## 2023-10-12 NOTE — PROGRESS NOTES
Subjective   Berhane Chaidez is a 73 y.o. male presenting today for follow up of   Chief Complaint   Patient presents with    Nasal Congestion    Cough     Severe cough for 2-3 days       Cough  Associated symptoms include chills, rhinorrhea, a sore throat and wheezing. Pertinent negatives include no fever, rash or shortness of breath.        Pt presents with 3 days of cough, sore throat, malaise and fatigue, body aches, nasal congestion.  He hasn't been sleeping well due to cough. No fevers, but has had chills.  No shortness of breath.  His wife is starting with similar symptoms today.    Patient Active Problem List   Diagnosis    History of colon polyps    Prediabetes    ED (erectile dysfunction) of non-organic origin    Hyperlipidemia    Hypogonadism in male    BPH (benign prostatic hyperplasia)    Osteoarthritis of both hips    Encounter for loop recorder at end of battery life    History of diverticulitis    Osteoarthritis, knee    PSVT (paroxysmal supraventricular tachycardia)    History of basal cell carcinoma of skin    Onychomycosis    Adenomatous polyp    Infected sebaceous cyst of skin    DJD (degenerative joint disease) of knee    DJD (degenerative joint disease)    COVID    Stroke risk    Atrial tachycardia    Primary hypertension    Palpitations    Obstructive sleep apnea    Nonsustained ventricular tachycardia    Incarcerated umbilical hernia    Diabetes mellitus    Coronary artery calcification seen on computed tomography    Chest pain    Abnormal EKG    Routine health maintenance       Current Outpatient Medications on File Prior to Visit   Medication Sig    aspirin 81 MG EC tablet Take 1 tablet by mouth Daily. HELD SINCE 8/9/2022    bisoprolol (ZEBeta) 5 MG tablet TAKE ONE TABLET BY MOUTH DAILY    Boswellia-Glucosamine-Vit D (GLUCOSAMINE COMPLEX PO) Take 1,100 mg by mouth Daily. HOLD ONE WEEK PRIOR TO SURGERY    flecainide (TAMBOCOR) 50 MG tablet Take 1 tablet by mouth Every 12 (Twelve) Hours.     "Garlic 1000 MG capsule Take 1 capsule by mouth Daily. HOLD ONE WEEK PRIOR TO SURGERY    L-Lysine 1000 MG tablet Take 1 tablet by mouth Daily. HOLD ONE WEEK PRIOR TO SURGERY    lisinopril (PRINIVIL,ZESTRIL) 10 MG tablet Take 1 tablet by mouth Daily.    sildenafil (REVATIO) 20 MG tablet Take 1 tablet by mouth As Needed. HOLD FOR 3 DAYS PRIOR TO SURGERY    simvastatin (ZOCOR) 40 MG tablet Take 1 tablet by mouth Daily. AT BEDTIME    tamsulosin (FLOMAX) 0.4 MG capsule 24 hr capsule Take 1 capsule by mouth Daily.    vitamin D3 125 MCG (5000 UT) capsule capsule Take 1 capsule by mouth Daily. HOLD ONE WEEK PRIOR TO SURGERY    [DISCONTINUED] chlorhexidine (PERIDEX) 0.12 % solution     [DISCONTINUED] Cyanocobalamin (Vitamin B 12) 500 MCG tablet Take 2 tablets by mouth Daily.    [DISCONTINUED] Testosterone 20.25 MG/ACT (1.62%) gel      No current facility-administered medications on file prior to visit.          The following portions of the patient's history were reviewed and updated as appropriate: allergies, current medications, past family history, past medical history, past social history, past surgical history and problem list.    Review of Systems   Constitutional:  Positive for activity change, chills and fatigue. Negative for appetite change and fever.   HENT:  Positive for congestion, rhinorrhea and sore throat.    Respiratory:  Positive for cough and wheezing. Negative for shortness of breath.    Gastrointestinal: Negative.  Negative for abdominal pain, diarrhea and vomiting.   Skin:  Negative for rash.       Objective   Vitals:    10/12/23 1533   BP: 128/70   BP Location: Left arm   Patient Position: Sitting   Cuff Size: Adult   Pulse: 87   Temp: 99.3 øF (37.4 øC)   TempSrc: Infrared   SpO2: 95%   Weight: 111 kg (244 lb 6.4 oz)   Height: 175.3 cm (69.02\")       BP Readings from Last 3 Encounters:   10/12/23 128/70   08/21/23 132/70   02/22/23 130/72        Wt Readings from Last 3 Encounters:   10/12/23 111 kg (244 " lb 6.4 oz)   08/21/23 109 kg (240 lb 3.2 oz)   02/22/23 108 kg (238 lb 3.2 oz)        Body mass index is 36.07 kg/mý.  Nursing notes and vitals reviewed.    Physical Exam  Vitals reviewed.   Constitutional:       Appearance: He is well-developed.   HENT:      Head: Normocephalic.      Right Ear: Tympanic membrane normal.      Left Ear: Tympanic membrane normal.      Nose: Mucosal edema and rhinorrhea present.      Right Sinus: No maxillary sinus tenderness or frontal sinus tenderness.      Left Sinus: No maxillary sinus tenderness or frontal sinus tenderness.   Eyes:      Conjunctiva/sclera: Conjunctivae normal.      Pupils: Pupils are equal, round, and reactive to light.   Cardiovascular:      Rate and Rhythm: Normal rate and regular rhythm.      Heart sounds: No murmur heard.  Pulmonary:      Effort: Pulmonary effort is normal.      Breath sounds: Normal breath sounds. Wheezing, rhonchi and rales present.   Abdominal:      General: Bowel sounds are normal.      Palpations: Abdomen is soft.   Musculoskeletal:      Cervical back: Normal range of motion.   Lymphadenopathy:      Cervical: Cervical adenopathy present.   Skin:     General: Skin is warm.      Findings: No rash.   Neurological:      Mental Status: He is alert and oriented to person, place, and time.   Psychiatric:         Behavior: Behavior normal.         Recent Results (from the past 672 hour(s))   POCT Influenza A/B    Collection Time: 10/12/23  4:13 PM    Specimen: Swab   Result Value Ref Range    Rapid Influenza A Ag Negative Negative    Rapid Influenza B Ag Negative Negative    Internal Control Passed Passed    Lot Number [id number]     Expiration Date 11/10/25    POCT SARS-CoV-2 Antigen NATALIO    Collection Time: 10/12/23  4:15 PM    Specimen: Swab   Result Value Ref Range    SARS Antigen Not Detected Not Detected, Presumptive Negative    Internal Control Passed Passed    Lot Number [id number]     Expiration Date 4/16/24          Assessment & Plan    Diagnoses and all orders for this visit:    1. Nasal congestion (Primary)  -     POCT SARS-CoV-2 Antigen NATALIO  -     POCT Influenza A/B    2. Viral URI with cough  -     POCT SARS-CoV-2 Antigen NATALIO  -     POCT Influenza A/B  -     XR Chest PA & Lateral; Future  -     HYDROcodone Bit-Homatrop MBr (HYCODAN) 5-1.5 MG/5ML solution; Take 5 mL by mouth Every 6 (Six) Hours As Needed for Cough.  Dispense: 120 mL; Refill: 0  -     benzonatate (TESSALON) 200 MG capsule; Take 1 capsule by mouth 3 (Three) Times a Day As Needed for Cough.  Dispense: 30 capsule; Refill: 0    3. Abnormal breath sounds  -     XR Chest PA & Lateral; Future    4. Wheezing  -     albuterol sulfate  (90 Base) MCG/ACT inhaler; Inhale 2 puffs Every 4 (Four) Hours As Needed for Wheezing.  Dispense: 18 g; Refill: 0      He has rales and rhonchi bilaterally on CXR as well as wheezing.  Check CXR.    Albuterol and cough medications prescribed.  Anticipatory guidance given.    Medications, including side effects, were discussed with the patient. Patient verbalized understanding.  The plan of care was discussed. All questions were answered. Patient verbalized understanding.      Return for Next scheduled follow up.

## 2024-02-22 ENCOUNTER — OFFICE VISIT (OUTPATIENT)
Dept: INTERNAL MEDICINE | Facility: CLINIC | Age: 74
End: 2024-02-22
Payer: MEDICARE

## 2024-02-22 VITALS
DIASTOLIC BLOOD PRESSURE: 76 MMHG | HEIGHT: 69 IN | WEIGHT: 242 LBS | SYSTOLIC BLOOD PRESSURE: 122 MMHG | TEMPERATURE: 97.3 F | BODY MASS INDEX: 35.84 KG/M2 | HEART RATE: 60 BPM | OXYGEN SATURATION: 94 %

## 2024-02-22 DIAGNOSIS — E55.9 HYPOVITAMINOSIS D: ICD-10-CM

## 2024-02-22 DIAGNOSIS — Z86.010 HISTORY OF COLON POLYPS: ICD-10-CM

## 2024-02-22 DIAGNOSIS — R73.03 PREDIABETES: ICD-10-CM

## 2024-02-22 DIAGNOSIS — Z12.5 SPECIAL SCREENING FOR MALIGNANT NEOPLASM OF PROSTATE: ICD-10-CM

## 2024-02-22 DIAGNOSIS — Z00.00 MEDICARE ANNUAL WELLNESS VISIT, SUBSEQUENT: Primary | ICD-10-CM

## 2024-02-22 DIAGNOSIS — E78.5 HYPERLIPIDEMIA, UNSPECIFIED HYPERLIPIDEMIA TYPE: ICD-10-CM

## 2024-02-22 LAB
EXPIRATION DATE: NORMAL
Lab: NORMAL
POC CREATININE URINE: NORMAL
POC MICROALBUMIN URINE: NORMAL

## 2024-02-22 PROCEDURE — G0439 PPPS, SUBSEQ VISIT: HCPCS | Performed by: FAMILY MEDICINE

## 2024-02-22 PROCEDURE — 3074F SYST BP LT 130 MM HG: CPT | Performed by: FAMILY MEDICINE

## 2024-02-22 PROCEDURE — 1170F FXNL STATUS ASSESSED: CPT | Performed by: FAMILY MEDICINE

## 2024-02-22 PROCEDURE — 3078F DIAST BP <80 MM HG: CPT | Performed by: FAMILY MEDICINE

## 2024-02-22 PROCEDURE — 1160F RVW MEDS BY RX/DR IN RCRD: CPT | Performed by: FAMILY MEDICINE

## 2024-02-22 PROCEDURE — 1159F MED LIST DOCD IN RCRD: CPT | Performed by: FAMILY MEDICINE

## 2024-02-22 PROCEDURE — 82044 UR ALBUMIN SEMIQUANTITATIVE: CPT | Performed by: FAMILY MEDICINE

## 2024-02-22 PROCEDURE — 3044F HG A1C LEVEL LT 7.0%: CPT | Performed by: FAMILY MEDICINE

## 2024-02-22 NOTE — PROGRESS NOTES
The ABCs of the Annual Wellness Visit  Subsequent Medicare Wellness Visit    Subjective      Berhane Chaidez is a 73 y.o. male who presents for a Subsequent Medicare Wellness Visit.    The following portions of the patient's history were reviewed and   updated as appropriate: allergies, current medications, past family history, past medical history, past social history, past surgical history, and problem list.    Compared to one year ago, the patient feels his physical   health is the same.    Compared to one year ago, the patient feels his mental   health is the same.    Recent Hospitalizations:  He was not admitted to the hospital during the last year.       Current Medical Providers:  Patient Care Team:  Aung Britt MD as PCP - General (Family Medicine)  Sosa Donohue MD as Consulting Physician (Dermatology)  Nael Morgan MD as Consulting Physician (Cardiac Electrophysiology)  Cruz Dumont Jr., MD as Consulting Physician (Urology)    Outpatient Medications Prior to Visit   Medication Sig Dispense Refill    aspirin 81 MG EC tablet Take 1 tablet by mouth Daily. HELD SINCE 8/9/2022      bisoprolol (ZEBeta) 5 MG tablet TAKE ONE TABLET BY MOUTH DAILY      Boswellia-Glucosamine-Vit D (GLUCOSAMINE COMPLEX PO) Take 1,100 mg by mouth Daily. HOLD ONE WEEK PRIOR TO SURGERY      flecainide (TAMBOCOR) 50 MG tablet Take 1 tablet by mouth Every 12 (Twelve) Hours.      Garlic 1000 MG capsule Take 1 capsule by mouth Daily. HOLD ONE WEEK PRIOR TO SURGERY      L-Lysine 1000 MG tablet Take 1 tablet by mouth Daily. HOLD ONE WEEK PRIOR TO SURGERY      lisinopril (PRINIVIL,ZESTRIL) 10 MG tablet Take 1 tablet by mouth Daily.      sildenafil (REVATIO) 20 MG tablet Take 1 tablet by mouth As Needed. HOLD FOR 3 DAYS PRIOR TO SURGERY      simvastatin (ZOCOR) 40 MG tablet Take 1 tablet by mouth Daily. AT BEDTIME 90 tablet 3    tamsulosin (FLOMAX) 0.4 MG capsule 24 hr capsule Take 1 capsule by mouth Daily. 90 capsule 2     vitamin D3 125 MCG (5000 UT) capsule capsule Take 1 capsule by mouth Daily. HOLD ONE WEEK PRIOR TO SURGERY      albuterol sulfate  (90 Base) MCG/ACT inhaler Inhale 2 puffs Every 4 (Four) Hours As Needed for Wheezing. 18 g 0    benzonatate (TESSALON) 200 MG capsule Take 1 capsule by mouth 3 (Three) Times a Day As Needed for Cough. 30 capsule 0    HYDROcodone Bit-Homatrop MBr (HYCODAN) 5-1.5 MG/5ML solution Take 5 mL by mouth Every 6 (Six) Hours As Needed for Cough. 120 mL 0     No facility-administered medications prior to visit.       No opioid medication identified on active medication list. I have reviewed chart for other potential  high risk medication/s and harmful drug interactions in the elderly.        Aspirin is on active medication list. Aspirin use is indicated based on review of current medical condition/s. Pros and cons of this therapy have been discussed today. Benefits of this medication outweigh potential harm.  Patient has been encouraged to continue taking this medication.  .      Patient Active Problem List   Diagnosis    History of colon polyps    Prediabetes    ED (erectile dysfunction) of non-organic origin    Hyperlipidemia    Hypogonadism in male    BPH (benign prostatic hyperplasia)    Osteoarthritis of both hips    Encounter for loop recorder at end of battery life    History of diverticulitis    Osteoarthritis, knee    PSVT (paroxysmal supraventricular tachycardia)    History of basal cell carcinoma of skin    Onychomycosis    Adenomatous polyp    Infected sebaceous cyst of skin    DJD (degenerative joint disease) of knee    DJD (degenerative joint disease)    COVID    Stroke risk    Atrial tachycardia    Primary hypertension    Palpitations    Obstructive sleep apnea    Nonsustained ventricular tachycardia    Incarcerated umbilical hernia    Coronary artery calcification seen on computed tomography    Chest pain    Abnormal EKG    Routine health maintenance     Advance Care  "Planning   Advance Care Planning     Advance Directive is not on file.  ACP discussion was held with the patient during this visit. Patient has an advance directive (not in EMR), copy requested.     Objective    Vitals:    24 1022   BP: 122/76   BP Location: Left arm   Patient Position: Sitting   Cuff Size: Large Adult   Pulse: 60   Temp: 97.3 °F (36.3 °C)   TempSrc: Infrared   SpO2: 94%   Weight: 110 kg (242 lb)   Height: 175.3 cm (69\")   PainSc: 0-No pain     Estimated body mass index is 35.74 kg/m² as calculated from the following:    Height as of this encounter: 175.3 cm (69\").    Weight as of this encounter: 110 kg (242 lb).    Class 2 Severe Obesity (BMI >=35 and <=39.9). Obesity-related health conditions include the following: hypertension. Obesity is improving with lifestyle modifications. BMI is is above average; BMI management plan is completed. We discussed portion control and increasing exercise.      Does the patient have evidence of cognitive impairment?   No    Lab Results   Component Value Date    CHLPL 173 02/15/2024    TRIG 135 02/15/2024    HDL 52 02/15/2024    LDL 97 02/15/2024    VLDL 24 02/15/2024    HGBA1C 6.30 (H) 02/15/2024          HEALTH RISK ASSESSMENT    Smoking Status:  Social History     Tobacco Use   Smoking Status Former    Packs/day: 1.00    Years: 5.00    Additional pack years: 0.00    Total pack years: 5.00    Types: Cigarettes    Start date: 1967    Quit date: 1973    Years since quittin.5   Smokeless Tobacco Never     Alcohol Consumption:  Social History     Substance and Sexual Activity   Alcohol Use Yes    Alcohol/week: 14.0 standard drinks of alcohol    Types: 7 Cans of beer, 7 Shots of liquor per week    Comment: Patient states he drinks a little every day     Fall Risk Screen:    BLAINE Fall Risk Assessment was completed, and patient is at LOW risk for falls.Assessment completed on:2024    Depression Screenin/22/2024    10:19 AM "   PHQ-2/PHQ-9 Depression Screening   Little Interest or Pleasure in Doing Things 0-->not at all   Feeling Down, Depressed or Hopeless 0-->not at all   PHQ-9: Brief Depression Severity Measure Score 0       Health Habits and Functional and Cognitive Screenin/22/2024    10:17 AM   Functional & Cognitive Status   Do you have difficulty preparing food and eating? No   Do you have difficulty bathing yourself, getting dressed or grooming yourself? No   Do you have difficulty using the toilet? No   Do you have difficulty moving around from place to place? No   Do you have trouble with steps or getting out of a bed or a chair? No   Current Diet Well Balanced Diet   Dental Exam Up to date   Eye Exam Up to date   Exercise (times per week) 3 times per week   Current Exercises Include Stationary Bicycling/Spin Class   Do you need help using the phone?  No   Are you deaf or do you have serious difficulty hearing?  No   Do you need help to go to places out of walking distance? No   Do you need help shopping? No   Do you need help preparing meals?  No   Do you need help with housework?  No   Do you need help with laundry? No   Do you need help taking your medications? No   Do you need help managing money? No   Do you ever drive or ride in a car without wearing a seat belt? No   Have you felt unusual stress, anger or loneliness in the last month? No   Who do you live with? Spouse   If you need help, do you have trouble finding someone available to you? No   Do you have difficulty concentrating, remembering or making decisions? No       Age-appropriate Screening Schedule:  Refer to the list below for future screening recommendations based on patient's age, sex and/or medical conditions. Orders for these recommended tests are listed in the plan section. The patient has been provided with a written plan.    Health Maintenance   Topic Date Due    RSV Vaccine - Adults (1 - 1-dose 60+ series) Never done    DIABETIC FOOT EXAM   Never done    BMI FOLLOWUP  03/04/2021    DIABETIC EYE EXAM  04/01/2021    COVID-19 Vaccine (3 - 2023-24 season) 09/01/2023    LIPID PANEL  02/15/2025    HEMOGLOBIN A1C  02/15/2025    ANNUAL WELLNESS VISIT  02/22/2025    URINE MICROALBUMIN  02/22/2025    COLORECTAL CANCER SCREENING  08/10/2025    TDAP/TD VACCINES (2 - Td or Tdap) 10/13/2029    HEPATITIS C SCREENING  Completed    INFLUENZA VACCINE  Completed    Pneumococcal Vaccine 65+  Completed    AAA SCREEN (ONE-TIME)  Completed    ZOSTER VACCINE  Completed                  CMS Preventative Services Quick Reference  Risk Factors Identified During Encounter:    Immunizations Discussed/Encouraged:  UTD    The above risks/problems have been discussed with the patient.  Pertinent information has been shared with the patient in the After Visit Summary.    Diagnoses and all orders for this visit:    1. Medicare annual wellness visit, subsequent (Primary)    2. Hyperlipidemia, unspecified hyperlipidemia type  -     Comprehensive Metabolic Panel; Future  -     Lipid Panel With / Chol / HDL Ratio; Future  -     TSH; Future    3. Prediabetes  -     Hemoglobin A1c; Future  -     Vitamin B12; Future  -     POCT microalbumin    4. History of colon polyps  -     CBC Auto Differential; Future    5. Hypovitaminosis D  -     Vitamin D,25-Hydroxy; Future    6. Special screening for malignant neoplasm of prostate  -     PSA Screen; Future      1. Hyperlipidemia.  Well-controlled.  Continue simvastatin and lifestyle measures.     2. Prediabetes.  A1c 6.3, up from 5.8.  Stay off metformin and continue lifestyle measures.      3. BPH.  Symptoms controlled.  Per Dr. Dumont.     4. Hypogonadism.  Has is planning to get Testopel per Dr. Dumont.       5. Hx BCC.  Followed every 6 months by Dr. Donohue.     6. SVT.  He sees Dr. Morgan.  He is on bisoprolol and flecainide.  Doing well.     7. History of colon polyps.  Colonoscopy 8/2022 showed tubular adenomas X 3.  Dr. Dai recommended  "repeat in 3 years.     8. E. D. Controlled with sildenafil per Dr. Dumont.     9. Routine health maint.  Pneumococcal vaccines UTD.  Had Shingrix at pharmacy.  Flu vaccine UTD.  Tdap done 2019.  AAA screening u/s wasn't done but he had CT scan 2016 with \"normal caliber aorta.\"    Follow Up:   Next Medicare Wellness visit to be scheduled in 1 year.      An After Visit Summary and PPPS were made available to the patient.            "

## 2024-03-21 DIAGNOSIS — N40.1 BENIGN NON-NODULAR PROSTATIC HYPERPLASIA WITH LOWER URINARY TRACT SYMPTOMS: ICD-10-CM

## 2024-03-21 RX ORDER — TAMSULOSIN HYDROCHLORIDE 0.4 MG/1
1 CAPSULE ORAL DAILY
Qty: 90 CAPSULE | Refills: 2 | Status: SHIPPED | OUTPATIENT
Start: 2024-03-21

## 2024-10-16 ENCOUNTER — OFFICE VISIT (OUTPATIENT)
Dept: INTERNAL MEDICINE | Facility: CLINIC | Age: 74
End: 2024-10-16
Payer: MEDICARE

## 2024-10-16 VITALS
OXYGEN SATURATION: 96 % | HEIGHT: 69 IN | HEART RATE: 59 BPM | WEIGHT: 244.8 LBS | DIASTOLIC BLOOD PRESSURE: 64 MMHG | BODY MASS INDEX: 36.26 KG/M2 | TEMPERATURE: 98 F | SYSTOLIC BLOOD PRESSURE: 112 MMHG

## 2024-10-16 DIAGNOSIS — E78.5 HYPERLIPIDEMIA, UNSPECIFIED HYPERLIPIDEMIA TYPE: Primary | ICD-10-CM

## 2024-10-16 DIAGNOSIS — I47.10 PSVT (PAROXYSMAL SUPRAVENTRICULAR TACHYCARDIA): ICD-10-CM

## 2024-10-16 DIAGNOSIS — R73.03 PREDIABETES: ICD-10-CM

## 2024-10-16 DIAGNOSIS — Z85.828 HISTORY OF BASAL CELL CARCINOMA OF SKIN: ICD-10-CM

## 2024-10-16 DIAGNOSIS — E29.1 HYPOGONADISM IN MALE: ICD-10-CM

## 2024-10-16 DIAGNOSIS — E55.9 HYPOVITAMINOSIS D: ICD-10-CM

## 2024-10-16 DIAGNOSIS — Z86.0100 HISTORY OF COLON POLYPS: ICD-10-CM

## 2024-10-16 DIAGNOSIS — N40.1 BENIGN PROSTATIC HYPERPLASIA WITH LOWER URINARY TRACT SYMPTOMS, SYMPTOM DETAILS UNSPECIFIED: ICD-10-CM

## 2024-10-16 DIAGNOSIS — Z23 ENCOUNTER FOR ADMINISTRATION OF VACCINE: ICD-10-CM

## 2024-10-16 DIAGNOSIS — I10 PRIMARY HYPERTENSION: ICD-10-CM

## 2024-10-16 DIAGNOSIS — F52.21 ED (ERECTILE DYSFUNCTION) OF NON-ORGANIC ORIGIN: ICD-10-CM

## 2024-10-16 DIAGNOSIS — Z00.00 ROUTINE HEALTH MAINTENANCE: ICD-10-CM

## 2024-10-16 PROCEDURE — 1160F RVW MEDS BY RX/DR IN RCRD: CPT | Performed by: FAMILY MEDICINE

## 2024-10-16 PROCEDURE — 3078F DIAST BP <80 MM HG: CPT | Performed by: FAMILY MEDICINE

## 2024-10-16 PROCEDURE — 99214 OFFICE O/P EST MOD 30 MIN: CPT | Performed by: FAMILY MEDICINE

## 2024-10-16 PROCEDURE — 3074F SYST BP LT 130 MM HG: CPT | Performed by: FAMILY MEDICINE

## 2024-10-16 PROCEDURE — 1159F MED LIST DOCD IN RCRD: CPT | Performed by: FAMILY MEDICINE

## 2024-10-16 PROCEDURE — G0008 ADMIN INFLUENZA VIRUS VAC: HCPCS | Performed by: FAMILY MEDICINE

## 2024-10-16 PROCEDURE — 90662 IIV NO PRSV INCREASED AG IM: CPT | Performed by: FAMILY MEDICINE

## 2024-10-16 PROCEDURE — 1126F AMNT PAIN NOTED NONE PRSNT: CPT | Performed by: FAMILY MEDICINE

## 2024-10-16 RX ORDER — LISINOPRIL 5 MG/1
5 TABLET ORAL DAILY
Qty: 90 TABLET | Refills: 1 | Status: SHIPPED | OUTPATIENT
Start: 2024-10-16

## 2024-10-16 NOTE — PROGRESS NOTES
Subjective     Berhane Chaidez is a 74 y.o. male, who presents with a chief complaint of   Chief Complaint   Patient presents with    Hyperlipidemia     Follow up     Hyperlipidemia    Hypertension    Diabetes      1. Hyperlipidemia.  Pt tolerating simvastatin.  He exercises regularly and tries to watch his diet.    2. Prediabetes.  He was previously on metformin but now controls this with lifestyle measures.     3. SVT.  He is on bisoprolol and flecainide.  He reports that he is feeling well.  Denies palpitations.  He sees Dr. Morgan.      4. HTN.  BP running low at home and he feels light-headed and tired at times.  He wonders about decreasing the lisinopril.    The following portions of the patient's history were reviewed and updated as appropriate: allergies, current medications, past family history, past medical history, past social history, past surgical history and problem list.    Allergies: Patient has no known allergies.    Review of Systems   Constitutional: Negative.    HENT: Negative.     Eyes: Negative.    Respiratory: Negative.     Cardiovascular: Negative.    Gastrointestinal: Negative.    Endocrine: Negative.    Genitourinary: Negative.    Musculoskeletal: Negative.    Skin: Negative.    Allergic/Immunologic: Negative.    Neurological: Negative.    Hematological: Negative.    Psychiatric/Behavioral: Negative.       Objective     Wt Readings from Last 3 Encounters:   10/16/24 111 kg (244 lb 12.8 oz)   02/22/24 110 kg (242 lb)   10/12/23 111 kg (244 lb 6.4 oz)     Temp Readings from Last 3 Encounters:   10/16/24 98 °F (36.7 °C) (Infrared)   02/22/24 97.3 °F (36.3 °C) (Infrared)   10/12/23 99.3 °F (37.4 °C) (Infrared)     BP Readings from Last 3 Encounters:   10/16/24 112/64   02/22/24 122/76   10/12/23 128/70     Pulse Readings from Last 3 Encounters:   10/16/24 59   02/22/24 60   10/12/23 87     Body mass index is 36.15 kg/m².  SpO2 Readings from Last 3 Encounters:   01/04/18 98%   06/29/17 94%    05/23/17 97%       Physical Exam   Constitutional: He is oriented to person, place, and time. He appears well-developed.   HENT:   Head: Normocephalic and atraumatic. Mouth/Throat: Mucous membranes are moist.   Eyes: Conjunctivae are normal.   Neck: No thyromegaly present.   Cardiovascular: Normal rate, regular rhythm and normal heart sounds.   No murmur heard.  Pulmonary/Chest: Effort normal and breath sounds normal.   Abdominal: Soft. Normal appearance. There is no abdominal tenderness.   Musculoskeletal:      Right lower leg: No edema.      Left lower leg: No edema.   Neurological: He is alert and oriented to person, place, and time.   Skin: Skin is warm and dry.   Psychiatric: His behavior is normal. Mood normal.   Nursing note and vitals reviewed.    Assessment/Plan   Diagnoses and all orders for this visit:    1. Hyperlipidemia, unspecified hyperlipidemia type (Primary)  -     Comprehensive Metabolic Panel; Future  -     Lipid Panel With / Chol / HDL Ratio; Future  -     TSH; Future    2. Prediabetes  -     Hemoglobin A1c; Future  -     Vitamin B12; Future    3. Benign prostatic hyperplasia with lower urinary tract symptoms, symptom details unspecified    4. Hypogonadism in male    5. History of basal cell carcinoma of skin    6. PSVT (paroxysmal supraventricular tachycardia)    7. History of colon polyps  -     CBC (No Diff); Future    8. ED (erectile dysfunction) of non-organic origin    9. Routine health maintenance    10. Hypovitaminosis D  -     Vitamin D,25-Hydroxy; Future    Other orders  -     lisinopril (PRINIVIL,ZESTRIL) 5 MG tablet; Take 1 tablet by mouth Daily.  Dispense: 90 tablet; Refill: 1    1. Hyperlipidemia.  Well-controlled.  Continue simvastatin and lifestyle measures.     2. Prediabetes.  A1c 6.0, down from 6.3.  Stay off metformin and continue lifestyle measures.      3. BPH.  Symptoms controlled.  Per Dr. Dumont.     4. Hypogonadism.  Has is planning to get Testopel per Dr. Dumont.      "  5. Hx BCC.  Followed every 6 months by Dr. Donohue.     6. SVT.  He sees Dr. Morgan.  He is on bisoprolol and flecainide.  Doing well.     7. History of colon polyps.  Colonoscopy 8/2022 showed tubular adenomas X 3.  Dr. Dai recommended repeat in 3 years.     8. E. D. Controlled with sildenafil per Dr. Dumont.     9. Routine health maint.  Pneumococcal vaccines UTD.  Had Shingrix at pharmacy.  Flu vaccine today.  Tdap done 2019.  AAA screening u/s wasn't done but he had CT scan 2016 with \"normal caliber aorta.\"  Considering Covid-19 and RSV at pharmacy.    10. HTN.  Over-treated.  Decrease lisinopril from 10 mg to 5 mg daily and monitor home blood pressures.    Outpatient Medications Prior to Visit   Medication Sig Dispense Refill    aspirin 81 MG EC tablet Take 1 tablet by mouth Daily. HELD SINCE 8/9/2022      bisoprolol (ZEBeta) 5 MG tablet TAKE ONE TABLET BY MOUTH DAILY      Boswellia-Glucosamine-Vit D (GLUCOSAMINE COMPLEX PO) Take 1,100 mg by mouth Daily. HOLD ONE WEEK PRIOR TO SURGERY      flecainide (TAMBOCOR) 50 MG tablet Take 1 tablet by mouth Every 12 (Twelve) Hours.      Garlic 1000 MG capsule Take 1 capsule by mouth Daily. HOLD ONE WEEK PRIOR TO SURGERY      L-Lysine 1000 MG tablet Take 1 tablet by mouth Daily. HOLD ONE WEEK PRIOR TO SURGERY      sildenafil (REVATIO) 20 MG tablet Take 1 tablet by mouth As Needed. HOLD FOR 3 DAYS PRIOR TO SURGERY      simvastatin (ZOCOR) 40 MG tablet Take 1 tablet by mouth Daily. AT BEDTIME 90 tablet 3    tamsulosin (FLOMAX) 0.4 MG capsule 24 hr capsule Take 1 capsule by mouth Daily. 90 capsule 2    vitamin D3 125 MCG (5000 UT) capsule capsule Take 1 capsule by mouth Daily. HOLD ONE WEEK PRIOR TO SURGERY      lisinopril (PRINIVIL,ZESTRIL) 10 MG tablet Take 1 tablet by mouth Daily.       No facility-administered medications prior to visit.     New Medications Ordered This Visit   Medications    lisinopril (PRINIVIL,ZESTRIL) 5 MG tablet     Sig: Take 1 tablet by mouth " Daily.     Dispense:  90 tablet     Refill:  1       [unfilled]  Medications Discontinued During This Encounter   Medication Reason    lisinopril (PRINIVIL,ZESTRIL) 10 MG tablet Reorder         Return in about 6 months (around 4/16/2025) for Medicare Wellness.    Answers submitted by the patient for this visit:  Primary Reason for Visit (Submitted on 8/14/2023)  What is the primary reason for your visit?: Other  Other (Submitted on 8/14/2023)  Please describe your symptoms.: Blood results.  Have you had these symptoms before?: No  How long have you been having these symptoms?: 1-4 days

## 2024-11-01 NOTE — PLAN OF CARE
Pt expressed agitation of increased post-op soreness/pain today which he correlates with PT session yesterday. Educated expected post-op activity which includes trying to achieve 0-90 deg ROM and walking at least 20ft (patient achieved ROM tolerating 10 reps and walked 15ft). Attempted to explain normal post-op pain recovery and normal muscle soreness from exercise lasts 24hours but patient insisted he did too much yesterday. Pt still agreeable to stair training ascending/descending 10 steps with R descending rail with step to gait pattern and standby assist. Pt then ambulated 120ft with FWW and supervision. Deferred therex today per patient request. Additional education on pain management and adjusting therex prescription today to 5 reps instead of 10 and hopeful to increase to 10 reps tomorrow with existing goal of 0-90deg L knee AROM. No other acute PT needs. Denies DME needs. Safe for DC home with assist and HHPT to follow.  Problem: Adult Inpatient Plan of Care  Goal: Plan of Care Review  Outcome: Ongoing, Progressing  Flowsheets (Taken 4/26/2021 1622 by Hilda Castillo, PT)  Plan of Care Reviewed With: patient   Goal Outcome Evaluation:            Pt reports stepping on an unknown object with her R foot while cleaning out her pantry approx 5 weeks ago - states she stepped on it just distal to her R heel which startled her d/t the sudden intense pain, causing her to drop the can that was in her hand. States the can she was holding, fell, striking the medial aspect of her R ankle and continued to slide to the floor. States pain to the bottom of her foot has resolved, but the pain and swelling to the medial aspect of her R ankle are the same as the day she injured it. States swelling increases t/o the day. Swelling and tenderness present to medial aspect of R ankle. Pedal pulses strong and equal bilat.

## 2024-12-27 DIAGNOSIS — N40.1 BENIGN NON-NODULAR PROSTATIC HYPERPLASIA WITH LOWER URINARY TRACT SYMPTOMS: ICD-10-CM

## 2024-12-27 RX ORDER — TAMSULOSIN HYDROCHLORIDE 0.4 MG/1
1 CAPSULE ORAL DAILY
Qty: 90 CAPSULE | Refills: 2 | Status: SHIPPED | OUTPATIENT
Start: 2024-12-27

## 2024-12-27 NOTE — TELEPHONE ENCOUNTER
Pt requesting  Rx Refill Note  Requested Prescriptions     Pending Prescriptions Disp Refills    tamsulosin (FLOMAX) 0.4 MG capsule 24 hr capsule 90 capsule 2     Sig: Take 1 capsule by mouth Daily.      Last office visit with prescribing clinician: 10/16/2024   Last telemedicine visit with prescribing clinician: Visit date not found   Next office visit with prescribing clinician: 4/16/2025                         Would you like a call back once the refill request has been completed: [] Yes [] No    If the office needs to give you a call back, can they leave a voicemail: [] Yes [] No    Sonja Pope MA  12/27/24, 14:18 EST

## 2025-04-03 RX ORDER — LISINOPRIL 5 MG/1
5 TABLET ORAL DAILY
Qty: 90 TABLET | Refills: 1 | Status: SHIPPED | OUTPATIENT
Start: 2025-04-03

## 2025-04-03 NOTE — TELEPHONE ENCOUNTER
Rx Refill Note  Requested Prescriptions     Pending Prescriptions Disp Refills    lisinopril (PRINIVIL,ZESTRIL) 5 MG tablet 90 tablet 1     Sig: Take 1 tablet by mouth Daily.      Last office visit with prescribing clinician: 10/16/2024   Last telemedicine visit with prescribing clinician: Visit date not found   Next office visit with prescribing clinician: 4/16/2025                         Would you like a call back once the refill request has been completed: [] Yes [] No    If the office needs to give you a call back, can they leave a voicemail: [] Yes [] No    Sury Hopson MA  04/03/25, 13:52 EDT

## 2025-04-16 ENCOUNTER — OFFICE VISIT (OUTPATIENT)
Dept: INTERNAL MEDICINE | Facility: CLINIC | Age: 75
End: 2025-04-16
Payer: MEDICARE

## 2025-04-16 VITALS
BODY MASS INDEX: 37.09 KG/M2 | HEART RATE: 65 BPM | HEIGHT: 69 IN | WEIGHT: 250.4 LBS | TEMPERATURE: 98 F | DIASTOLIC BLOOD PRESSURE: 68 MMHG | SYSTOLIC BLOOD PRESSURE: 118 MMHG | OXYGEN SATURATION: 94 %

## 2025-04-16 DIAGNOSIS — F52.21 ED (ERECTILE DYSFUNCTION) OF NON-ORGANIC ORIGIN: ICD-10-CM

## 2025-04-16 DIAGNOSIS — E78.5 HYPERLIPIDEMIA, UNSPECIFIED HYPERLIPIDEMIA TYPE: ICD-10-CM

## 2025-04-16 DIAGNOSIS — Z23 ENCOUNTER FOR ADMINISTRATION OF VACCINE: ICD-10-CM

## 2025-04-16 DIAGNOSIS — N40.1 BENIGN PROSTATIC HYPERPLASIA WITH LOWER URINARY TRACT SYMPTOMS, SYMPTOM DETAILS UNSPECIFIED: ICD-10-CM

## 2025-04-16 DIAGNOSIS — R73.03 PREDIABETES: ICD-10-CM

## 2025-04-16 DIAGNOSIS — I10 PRIMARY HYPERTENSION: ICD-10-CM

## 2025-04-16 DIAGNOSIS — Z85.828 HISTORY OF BASAL CELL CARCINOMA OF SKIN: ICD-10-CM

## 2025-04-16 DIAGNOSIS — Z86.0100 HISTORY OF COLON POLYPS: ICD-10-CM

## 2025-04-16 DIAGNOSIS — Z00.00 ROUTINE HEALTH MAINTENANCE: ICD-10-CM

## 2025-04-16 DIAGNOSIS — E29.1 HYPOGONADISM IN MALE: ICD-10-CM

## 2025-04-16 DIAGNOSIS — I47.10 PSVT (PAROXYSMAL SUPRAVENTRICULAR TACHYCARDIA): ICD-10-CM

## 2025-04-16 DIAGNOSIS — Z00.00 MEDICARE ANNUAL WELLNESS VISIT, SUBSEQUENT: Primary | ICD-10-CM

## 2025-04-16 NOTE — ASSESSMENT & PLAN NOTE
Orders:    Comprehensive Metabolic Panel; Future    Lipid Panel With / Chol / HDL Ratio; Future    TSH; Future

## 2025-04-16 NOTE — PROGRESS NOTES
Subjective   The ABCs of the Annual Wellness Visit  Medicare Wellness Visit      Berhane Chaidez is a 75 y.o. patient who presents for a Medicare Wellness Visit.    The following portions of the patient's history were reviewed and   updated as appropriate: allergies, current medications, past family history, past medical history, past social history, past surgical history, and problem list.    Compared to one year ago, the patient's physical   health is the same.  Compared to one year ago, the patient's mental   health is the same.    Recent Hospitalizations:  He was not admitted to the hospital during the last year.     Current Medical Providers:  Patient Care Team:  Aung Britt MD as PCP - General (Family Medicine)  Sosa Donohue MD as Consulting Physician (Dermatology)  Nael Morgan MD as Consulting Physician (Cardiac Electrophysiology)  Cruz Dumont Jr., MD as Consulting Physician (Urology)    Outpatient Medications Prior to Visit   Medication Sig Dispense Refill    aspirin 81 MG EC tablet Take 1 tablet by mouth Daily. HELD SINCE 8/9/2022      bisoprolol (ZEBeta) 5 MG tablet TAKE ONE TABLET BY MOUTH DAILY      Boswellia-Glucosamine-Vit D (GLUCOSAMINE COMPLEX PO) Take 1,100 mg by mouth Daily. HOLD ONE WEEK PRIOR TO SURGERY      flecainide (TAMBOCOR) 50 MG tablet Take 1 tablet by mouth Every 12 (Twelve) Hours.      Garlic 1000 MG capsule Take 1 capsule by mouth Daily. HOLD ONE WEEK PRIOR TO SURGERY      L-Lysine 1000 MG tablet Take 1 tablet by mouth Daily. HOLD ONE WEEK PRIOR TO SURGERY      lisinopril (PRINIVIL,ZESTRIL) 5 MG tablet Take 1 tablet by mouth Daily. 90 tablet 1    sildenafil (REVATIO) 20 MG tablet Take 1 tablet by mouth As Needed. HOLD FOR 3 DAYS PRIOR TO SURGERY      simvastatin (ZOCOR) 40 MG tablet Take 1 tablet by mouth Daily. AT BEDTIME 90 tablet 3    tamsulosin (FLOMAX) 0.4 MG capsule 24 hr capsule Take 1 capsule by mouth Daily. 90 capsule 2    vitamin D3 125 MCG (5000  UT) capsule capsule Take 1 capsule by mouth Daily. HOLD ONE WEEK PRIOR TO SURGERY       No facility-administered medications prior to visit.     No opioid medication identified on active medication list. I have reviewed chart for other potential  high risk medication/s and harmful drug interactions in the elderly.      Aspirin is on active medication list. Aspirin use is indicated based on review of current medical condition/s. Pros and cons of this therapy have been discussed today. Benefits of this medication outweigh potential harm.  Patient has been encouraged to continue taking this medication.  .      Patient Active Problem List   Diagnosis    History of colon polyps    Prediabetes    ED (erectile dysfunction) of non-organic origin    Hyperlipidemia    Hypogonadism in male    BPH (benign prostatic hyperplasia)    Osteoarthritis of both hips    Encounter for loop recorder at end of battery life    History of diverticulitis    Osteoarthritis, knee    PSVT (paroxysmal supraventricular tachycardia)    History of basal cell carcinoma of skin    Onychomycosis    Adenomatous polyp    Infected sebaceous cyst of skin    DJD (degenerative joint disease) of knee    DJD (degenerative joint disease)    COVID    Stroke risk    Atrial tachycardia    Primary hypertension    Palpitations    Obstructive sleep apnea    Nonsustained ventricular tachycardia    Incarcerated umbilical hernia    Coronary artery calcification seen on computed tomography    Chest pain    Abnormal EKG    Routine health maintenance     Advance Care Planning Advance Directive is not on file.  ACP discussion was held with the patient during this visit. Patient has an advance directive (not in EMR), copy requested.            Objective   Vitals:    04/16/25 1029   BP: 118/68   BP Location: Left arm   Patient Position: Sitting   Cuff Size: Adult   Pulse: 65   Temp: 98 °F (36.7 °C)   TempSrc: Infrared   SpO2: 94%   Weight: 114 kg (250 lb 6.4 oz)   Height:  "175.3 cm (69\")   PainSc: 0-No pain       Estimated body mass index is 36.98 kg/m² as calculated from the following:    Height as of this encounter: 175.3 cm (69\").    Weight as of this encounter: 114 kg (250 lb 6.4 oz).    Class 2 Severe Obesity (BMI >=35 and <=39.9). Obesity-related health conditions include the following: hypertension and impaired fasting glucose. Obesity is unchanged. BMI is is above average; BMI management plan is completed. We discussed portion control and increasing exercise.           Does the patient have evidence of cognitive impairment? No  Lab Results   Component Value Date    CHLPL 162 2025    TRIG 109 2025    HDL 56 2025    LDL 86 2025    VLDL 20 2025    HGBA1C 6.6 (H) 2025                                                                                                Health  Risk Assessment    Smoking Status:  Social History     Tobacco Use   Smoking Status Former    Current packs/day: 0.00    Average packs/day: 1 pack/day for 6.1 years (6.1 ttl pk-yrs)    Types: Cigarettes    Start date: 1967    Quit date: 1973    Years since quittin.6   Smokeless Tobacco Never     Alcohol Consumption:  Social History     Substance and Sexual Activity   Alcohol Use Yes    Alcohol/week: 14.0 standard drinks of alcohol    Types: 7 Cans of beer, 7 Shots of liquor per week    Comment: Patient states he drinks a little every day       Fall Risk Screen  STEADI Fall Risk Assessment was completed, and patient is at LOW risk for falls.Assessment completed on:2025    Depression Screening   Little interest or pleasure in doing things? Not at all   Feeling down, depressed, or hopeless? Not at all   PHQ-2 Total Score 0      Health Habits and Functional and Cognitive Screenin/16/2025    10:27 AM   Functional & Cognitive Status   Do you have difficulty preparing food and eating? No   Do you have difficulty bathing yourself, getting dressed or grooming " yourself? No   Do you have difficulty using the toilet? No   Do you have difficulty moving around from place to place? No   Do you have trouble with steps or getting out of a bed or a chair? No   Current Diet Well Balanced Diet   Dental Exam Up to date   Eye Exam Up to date   Exercise (times per week) 2 times per week   Current Exercises Include Stationary Bicycling/Spin Class   Do you need help using the phone?  No   Are you deaf or do you have serious difficulty hearing?  No   Do you need help to go to places out of walking distance? No   Do you need help shopping? No   Do you need help preparing meals?  No   Do you need help with housework?  No   Do you need help with laundry? No   Do you need help taking your medications? No   Do you need help managing money? No   Do you ever drive or ride in a car without wearing a seat belt? No   Have you felt unusual stress, anger or loneliness in the last month? No   Who do you live with? Spouse   If you need help, do you have trouble finding someone available to you? No   Have you been bothered in the last four weeks by sexual problems? No   Do you have difficulty concentrating, remembering or making decisions? No           Age-appropriate Screening Schedule:  Refer to the list below for future screening recommendations based on patient's age, sex and/or medical conditions. Orders for these recommended tests are listed in the plan section. The patient has been provided with a written plan.    Health Maintenance List  Health Maintenance   Topic Date Due    DIABETIC EYE EXAM  04/01/2021    RSV Vaccine - Adults (1 - 1-dose 75+ series) Never done    COVID-19 Vaccine (3 - 2024-25 season) 09/01/2024    COLORECTAL CANCER SCREENING  08/10/2025    INFLUENZA VACCINE  07/01/2025    LIPID PANEL  04/09/2026    HEMOGLOBIN A1C  04/09/2026    ANNUAL WELLNESS VISIT  04/16/2026    TDAP/TD VACCINES (2 - Td or Tdap) 10/13/2029    HEPATITIS C SCREENING  Completed    Pneumococcal Vaccine 50+   "Completed    ZOSTER VACCINE  Completed                                                                                                                                                CMS Preventative Services Quick Reference  Risk Factors Identified During Encounter  Immunizations Discussed/Encouraged: Prevnar 20 (Pneumococcal 20-valent conjugate)    The above risks/problems have been discussed with the patient.  Pertinent information has been shared with the patient in the After Visit Summary.  An After Visit Summary and PPPS were made available to the patient.    Follow Up:   Next Medicare Wellness visit to be scheduled in 1 year.         Additional E&M Note during same encounter follows:  Patient has additional, significant, and separately identifiable condition(s)/problem(s) that require work above and beyond the Medicare Wellness Visit     Chief Complaint  Medicare Wellness-subsequent and Hypertension    Subjective   HPI  LAWRENCE Chaidez is also being seen today for additional medical problem/s.    Review of Systems   Constitutional: Negative.    HENT: Negative.     Eyes: Negative.    Respiratory: Negative.     Cardiovascular: Negative.    Gastrointestinal: Negative.    Genitourinary: Negative.    Neurological:  Positive for tremors (with eating).   Psychiatric/Behavioral: Negative.                Objective   Vital Signs:  /68 (BP Location: Left arm, Patient Position: Sitting, Cuff Size: Adult)   Pulse 65   Temp 98 °F (36.7 °C) (Infrared)   Ht 175.3 cm (69\")   Wt 114 kg (250 lb 6.4 oz)   SpO2 94%   BMI 36.98 kg/m²   Physical Exam  Vitals and nursing note reviewed.   Constitutional:       Appearance: Normal appearance. He is well-developed.   HENT:      Head: Normocephalic and atraumatic.      Right Ear: Tympanic membrane and external ear normal.      Left Ear: Tympanic membrane and external ear normal.      Nose: Nose normal.      Mouth/Throat:      Mouth: Mucous membranes are moist.      Pharynx: " Oropharynx is clear.   Eyes:      General: No scleral icterus.        Right eye: No discharge.         Left eye: No discharge.      Extraocular Movements: Extraocular movements intact.      Conjunctiva/sclera: Conjunctivae normal.      Pupils: Pupils are equal, round, and reactive to light.   Neck:      Thyroid: No thyromegaly.      Vascular: No carotid bruit.   Cardiovascular:      Rate and Rhythm: Normal rate and regular rhythm.      Heart sounds: Normal heart sounds. No murmur heard.     No friction rub. No gallop.   Pulmonary:      Effort: Pulmonary effort is normal. No respiratory distress.      Breath sounds: Normal breath sounds. No wheezing or rales.   Abdominal:      General: Bowel sounds are normal.      Palpations: Abdomen is soft. There is no mass.      Tenderness: There is no abdominal tenderness.      Hernia: No hernia is present.   Musculoskeletal:         General: No deformity.      Cervical back: Neck supple. No rigidity.      Right lower leg: No edema.      Left lower leg: No edema.   Lymphadenopathy:      Cervical: No cervical adenopathy.   Skin:     General: Skin is warm and dry.      Findings: No rash.   Neurological:      General: No focal deficit present.      Mental Status: He is alert and oriented to person, place, and time.      Cranial Nerves: No cranial nerve deficit.      Motor: No abnormal muscle tone.      Coordination: Coordination normal.      Deep Tendon Reflexes: Reflexes are normal and symmetric. Reflexes normal.   Psychiatric:         Mood and Affect: Mood normal.         Behavior: Behavior normal.         Thought Content: Thought content normal.         Judgment: Judgment normal.                    Assessment and Plan      Medicare annual wellness visit, subsequent         Hyperlipidemia, unspecified hyperlipidemia type       Orders:    Comprehensive Metabolic Panel; Future    Lipid Panel With / Chol / HDL Ratio; Future    TSH; Future    Prediabetes    Orders:    Hemoglobin A1c;  "Future    Vitamin B12; Future    Benign prostatic hyperplasia with lower urinary tract symptoms, symptom details unspecified         Hypogonadism in male         History of basal cell carcinoma of skin         PSVT (paroxysmal supraventricular tachycardia)         History of colon polyps    Orders:    CBC (No Diff); Future    ED (erectile dysfunction) of non-organic origin         Primary hypertension           Routine health maintenance         Encounter for administration of vaccine    Orders:    Pneumococcal Conjugate Vaccine 20-Valent (PCV20)    1. Hyperlipidemia.  Well-controlled.  Continue simvastatin and lifestyle measures.     2. Prediabetes.  A1c 6.6, up from 6.0.  Options discussed.  Stay off metformin and work on lifestyle measures.      3. BPH.  Symptoms controlled.  Per Dr. Ly.     4. Hypogonadism.  Has is planning to get Testopel per Dr. Ly.     5. Hx BCC.  Followed every 6 months by Dr. Donohue.     6. SVT.  He sees Dr. Morgan.  He is on bisoprolol and flecainide.  Doing well.     7. History of colon polyps.  Colonoscopy 8/2022 showed tubular adenomas X 3.  Dr. Dai recommended repeat in 3 years.     8. E. D. Controlled with sildenafil per Dr. Dumont.    9. HTN.  Controlled.  Continue lisinopri 5 mg daily and monitor home blood pressures.     10. Routine health maint.  Pneumococcal vaccines UTD.  Had Shingrix at pharmacy.  Tdap done 2019.  AAA screening u/s wasn't done but he had CT scan 2016 with \"normal caliber aorta.\"  Considering RSV at pharmacy.         Follow Up   Return in about 6 months (around 10/16/2025).  Patient was given instructions and counseling regarding his condition or for health maintenance advice. Please see specific information pulled into the AVS if appropriate.    "

## 2025-05-08 ENCOUNTER — PREP FOR SURGERY (OUTPATIENT)
Dept: OTHER | Facility: HOSPITAL | Age: 75
End: 2025-05-08
Payer: MEDICARE

## 2025-05-08 ENCOUNTER — TELEPHONE (OUTPATIENT)
Dept: GASTROENTEROLOGY | Facility: CLINIC | Age: 75
End: 2025-05-08
Payer: MEDICARE

## 2025-05-08 DIAGNOSIS — Z12.11 ENCOUNTER FOR SCREENING FOR MALIGNANT NEOPLASM OF COLON: Primary | ICD-10-CM

## 2025-05-08 DIAGNOSIS — Z86.0101 HISTORY OF ADENOMATOUS POLYP OF COLON: ICD-10-CM

## 2025-05-08 NOTE — TELEPHONE ENCOUNTER
Last colonoscopy 8/10/22    Personal hx polyps  No family hx polyps and cx    Asa or blood thinners:  Aspirin    List medications:  Simvastatin  Tamsulosin  Bisoprolol  Flecainide  Lisinopril  Glucosamine  D3  Garlic  B12  L Lysine  Testapel  Sildenafil    OA form scanned in media

## 2025-05-15 ENCOUNTER — TELEPHONE (OUTPATIENT)
Dept: GASTROENTEROLOGY | Facility: CLINIC | Age: 75
End: 2025-05-15
Payer: MEDICARE

## 2025-05-15 NOTE — TELEPHONE ENCOUNTER
"Hub staff attempted to follow warm transfer process and was unsuccessful     Caller: Berhane Chaidez \"LAWRENCE Chaidez\"    Relationship to patient: Self    Best call back number: [phone] OR CALL HIS WIFE # [phone]    Patient is needing: PT IS RETURNING A CALL FROM [place]. IT'S OK TO [facility].     "

## 2025-05-16 ENCOUNTER — TELEPHONE (OUTPATIENT)
Dept: GASTROENTEROLOGY | Facility: CLINIC | Age: 75
End: 2025-05-16
Payer: MEDICARE

## 2025-05-16 ENCOUNTER — TELEPHONE (OUTPATIENT)
Dept: GASTROENTEROLOGY | Facility: CLINIC | Age: 75
End: 2025-05-16

## 2025-05-16 NOTE — TELEPHONE ENCOUNTER
MIYA cole  for COLONOSCOPY on 09/10  arrive at 630am   . mailed prep instructions to verified address on file....[facility] OK FOR THE HUB TO RELAY

## 2025-05-16 NOTE — TELEPHONE ENCOUNTER
"    Hub staff attempted to follow warm transfer process and was unsuccessful     Caller: Berhane Chaidez \"LAWRENCE Chaidez\"    Relationship to patient: Self    Best call back number: [phone]    Patient is needing: RETURNING MISSED CALL TO SCHEDULE SCOPE PLEASE CONTACT PT TO ASSIST.         "

## 2025-05-16 NOTE — TELEPHONE ENCOUNTER
"  Caller: Berhane Chaidez \"LAWRENCE Chaidez\"    Relationship: Self    Best call back number: [phone]    What is the best time to reach you: ANYTIME    Who are you requesting to speak with (clinical staff, provider,  specific staff member):       What was the call regarding: DOES DR CHEW PERFORM PROCS AT [place]? PLEASE CONTACT PT TO ADVISE.     "

## 2025-06-04 ENCOUNTER — TELEPHONE (OUTPATIENT)
Dept: GASTROENTEROLOGY | Facility: CLINIC | Age: 75
End: 2025-06-04

## 2025-06-04 NOTE — TELEPHONE ENCOUNTER
"Hub staff attempted to follow warm transfer process and was unsuccessful     Caller: Berhane Chaidez \"LAWRENCE Chaidez\"    Relationship to patient: Self    Best call back number: [phone]    Patient is needing: PATIENT CALLED NEEDS TO R/S PROCEDURE WITH DR CHEW ON 9-10-25        "

## 2025-06-05 ENCOUNTER — TELEPHONE (OUTPATIENT)
Dept: GASTROENTEROLOGY | Facility: CLINIC | Age: 75
End: 2025-06-05
Payer: MEDICARE

## 2025-06-05 NOTE — TELEPHONE ENCOUNTER
"      Hub staff attempted to follow warm transfer process and was unsuccessful     Caller: Berhane Chaidez \"LAWRENCE Chaidez\"    Relationship to patient: Self    Best call back number: [phone]      Patient is needing: PT CALLED YESTERDAY AND NEVER HEARD BACK FROM ANYONE.   PT NEEDS TO R/S HIS PROCEDURE IN SEPT. PLS CALL PT TO DISCUSS.           "

## 2025-06-13 ENCOUNTER — TELEPHONE (OUTPATIENT)
Dept: GASTROENTEROLOGY | Facility: CLINIC | Age: 75
End: 2025-06-13
Payer: MEDICARE

## 2025-06-13 NOTE — TELEPHONE ENCOUNTER
MIYA cole  for COLONOSCOPY on  07/01 arrive at 7am   . [facility] prep [facility]

## 2025-07-01 ENCOUNTER — ANESTHESIA (OUTPATIENT)
Dept: GASTROENTEROLOGY | Facility: HOSPITAL | Age: 75
End: 2025-07-01
Payer: MEDICARE

## 2025-07-01 ENCOUNTER — ANESTHESIA EVENT (OUTPATIENT)
Dept: GASTROENTEROLOGY | Facility: HOSPITAL | Age: 75
End: 2025-07-01
Payer: MEDICARE

## 2025-07-01 ENCOUNTER — HOSPITAL ENCOUNTER (OUTPATIENT)
Facility: HOSPITAL | Age: 75
Setting detail: HOSPITAL OUTPATIENT SURGERY
Discharge: HOME | End: 2025-07-01
Attending: INTERNAL MEDICINE | Admitting: INTERNAL MEDICINE
Payer: MEDICARE

## 2025-07-01 VITALS
HEART RATE: 63 BPM | WEIGHT: 242 LBS | SYSTOLIC BLOOD PRESSURE: 111 MMHG | BODY MASS INDEX: 36.68 KG/M2 | RESPIRATION RATE: 16 BRPM | HEIGHT: 68 IN | OXYGEN SATURATION: 98 % | DIASTOLIC BLOOD PRESSURE: 80 MMHG

## 2025-07-01 DIAGNOSIS — Z12.11 ENCOUNTER FOR SCREENING FOR MALIGNANT NEOPLASM OF COLON: ICD-10-CM

## 2025-07-01 DIAGNOSIS — Z86.0101 HISTORY OF ADENOMATOUS POLYP OF COLON: ICD-10-CM

## 2025-07-01 PROCEDURE — 25810000003 LACTATED RINGERS PER 1000 ML: Performed by: ANESTHESIOLOGY

## 2025-07-01 PROCEDURE — 88305 TISSUE EXAM BY PATHOLOGIST: CPT | Performed by: INTERNAL MEDICINE

## 2025-07-01 PROCEDURE — S0260 H&P FOR SURGERY: HCPCS | Performed by: INTERNAL MEDICINE

## 2025-07-01 PROCEDURE — 25010000002 LIDOCAINE 2% SOLUTION: Performed by: ANESTHESIOLOGY

## 2025-07-01 PROCEDURE — 25810000003 LACTATED RINGERS PER 1000 ML: Performed by: INTERNAL MEDICINE

## 2025-07-01 PROCEDURE — 25010000002 PROPOFOL 1000 MG/100ML EMULSION: Performed by: ANESTHESIOLOGY

## 2025-07-01 PROCEDURE — 25010000002 PROPOFOL 200 MG/20ML EMULSION: Performed by: ANESTHESIOLOGY

## 2025-07-01 RX ORDER — SODIUM CHLORIDE, SODIUM LACTATE, POTASSIUM CHLORIDE, CALCIUM CHLORIDE 600; 310; 30; 20 MG/100ML; MG/100ML; MG/100ML; MG/100ML
1000 INJECTION, SOLUTION INTRAVENOUS CONTINUOUS
Status: DISCONTINUED | OUTPATIENT
Start: 2025-07-01 | End: 2025-07-01 | Stop reason: HOSPADM

## 2025-07-01 RX ORDER — SODIUM CHLORIDE, SODIUM LACTATE, POTASSIUM CHLORIDE, CALCIUM CHLORIDE 600; 310; 30; 20 MG/100ML; MG/100ML; MG/100ML; MG/100ML
INJECTION, SOLUTION INTRAVENOUS CONTINUOUS PRN
Status: DISCONTINUED | OUTPATIENT
Start: 2025-07-01 | End: 2025-07-01 | Stop reason: SURG

## 2025-07-01 RX ORDER — LIDOCAINE HYDROCHLORIDE 20 MG/ML
INJECTION, SOLUTION INFILTRATION; PERINEURAL AS NEEDED
Status: DISCONTINUED | OUTPATIENT
Start: 2025-07-01 | End: 2025-07-01 | Stop reason: SURG

## 2025-07-01 RX ORDER — PROPOFOL 10 MG/ML
INJECTION, EMULSION INTRAVENOUS AS NEEDED
Status: DISCONTINUED | OUTPATIENT
Start: 2025-07-01 | End: 2025-07-01 | Stop reason: SURG

## 2025-07-01 RX ORDER — PROPOFOL 10 MG/ML
INJECTION, EMULSION INTRAVENOUS CONTINUOUS PRN
Status: DISCONTINUED | OUTPATIENT
Start: 2025-07-01 | End: 2025-07-01 | Stop reason: SURG

## 2025-07-01 RX ORDER — SODIUM CHLORIDE 0.9 % (FLUSH) 0.9 %
10 SYRINGE (ML) INJECTION AS NEEDED
Status: DISCONTINUED | OUTPATIENT
Start: 2025-07-01 | End: 2025-07-01 | Stop reason: HOSPADM

## 2025-07-01 RX ADMIN — SODIUM CHLORIDE, POTASSIUM CHLORIDE, SODIUM LACTATE AND CALCIUM CHLORIDE: 600; 310; 30; 20 INJECTION, SOLUTION INTRAVENOUS at 08:42

## 2025-07-01 RX ADMIN — PROPOFOL INJECTABLE EMULSION 100 MG: 10 INJECTION, EMULSION INTRAVENOUS at 08:48

## 2025-07-01 RX ADMIN — PROPOFOL 300 MCG/KG/MIN: 10 INJECTION, EMULSION INTRAVENOUS at 08:49

## 2025-07-01 RX ADMIN — SODIUM CHLORIDE, POTASSIUM CHLORIDE, SODIUM LACTATE AND CALCIUM CHLORIDE 1000 ML: 600; 310; 30; 20 INJECTION, SOLUTION INTRAVENOUS at 07:52

## 2025-07-01 RX ADMIN — LIDOCAINE HYDROCHLORIDE 100 MG: 20 INJECTION, SOLUTION INFILTRATION; PERINEURAL at 08:46

## 2025-07-01 NOTE — ANESTHESIA PREPROCEDURE EVALUATION
Anesthesia Evaluation     Patient summary reviewed and Nursing notes reviewed   NPO Solid Status: > 8 hours  NPO Liquid Status: > 4 hours           Airway   Mallampati: II  Neck ROM: full  No difficulty expected  Dental - normal exam     Pulmonary     breath sounds clear to auscultation  (+) a smoker Former,sleep apnea  Cardiovascular     Rhythm: regular    (+) hypertension, CAD, dysrhythmias Tachycardia, PVC, hyperlipidemia      Neuro/Psych  (+) numbness  GI/Hepatic/Renal/Endo    (+) obesity, morbid obesity, GI bleeding , renal disease-    Musculoskeletal     Abdominal    Substance History      OB/GYN          Other   arthritis,                 Anesthesia Plan    ASA 3     MAC     intravenous induction     Anesthetic plan, risks, benefits, and alternatives have been provided, discussed and informed consent has been obtained with: patient.    CODE STATUS:
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
## 2025-07-01 NOTE — DISCHARGE INSTRUCTIONS
For the next 24 hours patient needs to be with a responsible adult.    For 24 hours DO NOT drive, operate machinery, appliances, drink alcohol, make important decisions or sign legal documents.    Start with a light or bland diet if you are feeling sick to your stomach otherwise advance to regular diet as tolerated.    Follow recommendations on procedure report if provided by your doctor.    Call Dr Dai for problems [phone]    Problems may include but not limited to: large amounts of bleeding, trouble breathing, repeated vomiting, severe unrelieved pain, fever or chills.

## 2025-07-01 NOTE — ANESTHESIA POSTPROCEDURE EVALUATION
"Patient: Berhane Chaidez    Procedure Summary       Date: 07/01/25 Room / Location:  [facility] /  [facility]    Anesthesia Start: 0842 Anesthesia Stop: 0916    Procedure: COLONOSCOPY to cecum with cold snare and cold biopsy polypectomies Diagnosis:       Encounter for screening for malignant neoplasm of colon      History of adenomatous polyp of colon      (Encounter for screening for malignant neoplasm of colon [Z12.11])      (History of adenomatous polyp of colon [Z86.0101])    Surgeons: Ana Dai MD Provider: John Perez MD    Anesthesia Type: MAC ASA Status: 3            Anesthesia Type: MAC    Vitals  Vitals Value Taken Time   /80 07/01/25 09:31   Temp     Pulse 62 07/01/25 09:32   Resp 16 07/01/25 09:31   SpO2 95 % 07/01/25 09:32   Vitals shown include unfiled device data.        Post Anesthesia Care and Evaluation    Patient location during evaluation: bedside  Patient participation: complete - patient participated  Level of consciousness: sleepy but conscious  Pain score: 0  Pain management: adequate    Airway patency: patent  Anesthetic complications: No anesthetic complications    Cardiovascular status: acceptable  Respiratory status: acceptable  Hydration status: acceptable    Comments: /80 (BP Location: Left arm, Patient Position: Sitting)   Pulse 63   Resp 16   Ht 172.7 cm (68\")   Wt 110 kg (242 lb)   SpO2 98%   BMI 36.80 kg/m²       "

## 2025-07-07 ENCOUNTER — TELEPHONE (OUTPATIENT)
Dept: GASTROENTEROLOGY | Facility: CLINIC | Age: 75
End: 2025-07-07
Payer: MEDICARE

## (undated) DEVICE — SNAR POLYP SENSATION STDOVL 27 240 BX40

## (undated) DEVICE — KT ORCA ORCAPOD DISP STRL

## (undated) DEVICE — PK KN TOTL 40

## (undated) DEVICE — PENCL E/S ULTRAVAC TELESCP NOSE HOLSTR 10FT

## (undated) DEVICE — TUBING, SUCTION, 1/4" X 10', STRAIGHT: Brand: MEDLINE

## (undated) DEVICE — DRSNG TELFA PAD NONADH STR 1S 3X8IN

## (undated) DEVICE — LAG MINOR PROCEDURE: Brand: MEDLINE INDUSTRIES, INC.

## (undated) DEVICE — PREP SOL POVIDONE/IODINE BT 4OZ

## (undated) DEVICE — ANTIBACTERIAL UNDYED BRAIDED (POLYGLACTIN 910), SYNTHETIC ABSORBABLE SUTURE: Brand: COATED VICRYL

## (undated) DEVICE — SINGLE-USE BIOPSY FORCEPS: Brand: RADIAL JAW 4

## (undated) DEVICE — ADAPT CLN BIOGUARD AIR/H2O DISP

## (undated) DEVICE — PIN TROC SIGNATURE PK/2

## (undated) DEVICE — GAUZE,SPONGE,FLUFF,6"X6.75",STRL,10/TRAY: Brand: MEDLINE

## (undated) DEVICE — KT DRN EVAC WND PVC PCH WTROC RND 10F400

## (undated) DEVICE — STPLR SKIN VISISTAT WD 35CT

## (undated) DEVICE — THE SINGLE USE ETRAP – POLYP TRAP IS USED FOR SUCTION RETRIEVAL OF ENDOSCOPICALLY REMOVED POLYPS.: Brand: ETRAP

## (undated) DEVICE — CONTAINER,SPECIMEN,OR STERILE,4OZ: Brand: MEDLINE

## (undated) DEVICE — GAUZE,SPONGE,FLUFF,6"X6.75",STRL,5/TRAY: Brand: MEDLINE

## (undated) DEVICE — CANN NASL CO2 TRULINK W/O2 A/

## (undated) DEVICE — CANN O2 ETCO2 FITS ALL CONN CO2 SMPL A/ 7IN DISP LF

## (undated) DEVICE — TRY SKINPREP DRYPREP

## (undated) DEVICE — SYR LUERLOK 20CC

## (undated) DEVICE — SOL ANTISEP SCRB PVPI 7.5PCT 4OZ

## (undated) DEVICE — DRSNG WND GZ CURAD OIL EMULSION 3X8IN LF STRL 1PK

## (undated) DEVICE — 2108 SERIES SAGITTAL BLADE, NO OFFSET  (12.4 X 1.19 X 82.1MM)

## (undated) DEVICE — DUAL CUT SAGITTAL BLADE

## (undated) DEVICE — ENCORE® LATEX ORTHO SIZE 8.5, STERILE LATEX POWDER-FREE SURGICAL GLOVE: Brand: ENCORE

## (undated) DEVICE — SYR LUERLOK 20CC BX/50

## (undated) DEVICE — ZIP 16 SURGICAL SKIN CLOSURE DEVICE, PSA: Brand: ZIP 16 SURGICAL SKIN CLOSURE DEVICE

## (undated) DEVICE — DRSNG SURESITE WNDW 4X4.5

## (undated) DEVICE — 2108 SERIES SAGITTAL BLADE (19.5 X 1.27 X 81.0MM)

## (undated) DEVICE — PREMIUM WET SKIN PREP TRAY: Brand: MEDLINE INDUSTRIES, INC.

## (undated) DEVICE — BNDG ELAS ELITE V/CLOSE 6IN 5YD LF STRL

## (undated) DEVICE — GLV SURG TRIUMPH CLASSIC PF LTX 8.5 STRL

## (undated) DEVICE — UNDYED BRAIDED (POLYGLACTIN 910), SYNTHETIC ABSORBABLE SUTURE: Brand: COATED VICRYL

## (undated) DEVICE — LN SMPL CO2 SHTRM SD STREAM W/M LUER

## (undated) DEVICE — TRAP FLD MINIVAC MEGADYNE 100ML

## (undated) DEVICE — BANDAGE,GAUZE,BULKEE II,4.5"X4.1YD,STRL: Brand: MEDLINE

## (undated) DEVICE — GOWN ,SIRUS,NONREINFORCED SMALL: Brand: MEDLINE

## (undated) DEVICE — NEEDLE, QUINCKE, 20GX3.5": Brand: MEDLINE

## (undated) DEVICE — THE TORRENT IRRIGATION SCOPE CONNECTOR IS USED WITH THE TORRENT IRRIGATION TUBING TO PROVIDE IRRIGATION FLUIDS SUCH AS STERILE WATER DURING GASTROINTESTINAL ENDOSCOPIC PROCEDURES WHEN USED IN CONJUNCTION WITH AN IRRIGATION PUMP (OR ELECTROSURGICAL UNIT).: Brand: TORRENT

## (undated) DEVICE — SOL NACL 0.9PCT 1000ML

## (undated) DEVICE — GLV SURG SENSICARE ORTHO PF LF 6.5 STRL

## (undated) DEVICE — SENSR O2 OXIMAX FNGR A/ 18IN NONSTR

## (undated) DEVICE — SUT VIC 3/0 SH 27IN J416H

## (undated) DEVICE — Device: Brand: DEFENDO AIR/WATER/SUCTION AND BIOPSY VALVE

## (undated) DEVICE — APPL DURAPREP IODOPHOR APL 26ML

## (undated) DEVICE — GLV SURG PREMIERPRO ORTHO LTX PF SZ8.5 BRN

## (undated) DEVICE — NDL SPINE 20G 3 1/2 YEL STRL 1P/U

## (undated) DEVICE — GLV SURG SIGNATURE ESSENTIAL PF LTX SZ8.5

## (undated) DEVICE — UNDERCAST PADDING: Brand: DEROYAL

## (undated) DEVICE — DRSNG ADAPTIC 3X8